# Patient Record
Sex: FEMALE | Race: WHITE | NOT HISPANIC OR LATINO | Employment: FULL TIME | URBAN - METROPOLITAN AREA
[De-identification: names, ages, dates, MRNs, and addresses within clinical notes are randomized per-mention and may not be internally consistent; named-entity substitution may affect disease eponyms.]

---

## 2023-04-24 ENCOUNTER — TELEPHONE (OUTPATIENT)
Dept: PRIMARY CARE | Facility: CLINIC | Age: 68
End: 2023-04-24
Payer: MEDICARE

## 2023-04-24 DIAGNOSIS — F41.8 MIXED ANXIETY DEPRESSIVE DISORDER: Primary | ICD-10-CM

## 2023-04-24 RX ORDER — SERTRALINE HYDROCHLORIDE 100 MG/1
100 TABLET, FILM COATED ORAL DAILY
Qty: 30 TABLET | Refills: 0 | Status: SHIPPED | OUTPATIENT
Start: 2023-04-24 | End: 2023-08-17 | Stop reason: SDUPTHER

## 2023-04-24 RX ORDER — SERTRALINE HYDROCHLORIDE 100 MG/1
1 TABLET, FILM COATED ORAL DAILY
COMMUNITY
Start: 2020-10-20 | End: 2023-04-24 | Stop reason: SDUPTHER

## 2023-04-24 NOTE — TELEPHONE ENCOUNTER
Rx Refill Request Telephone Encounter    Name:  Shani Watson  :  [id number]  Medication Name:      Sertraline HCl 100 MG Oral Tablet 1 tab taken daily                  Specific Pharmacy location:  [facility] in [place]    Date of last appointment:  2023  Date of next appointment:  2023  Best number to reach patient:  [phone]

## 2023-05-30 ENCOUNTER — TELEPHONE (OUTPATIENT)
Dept: PRIMARY CARE | Facility: CLINIC | Age: 68
End: 2023-05-30
Payer: MEDICARE

## 2023-05-30 NOTE — TELEPHONE ENCOUNTER
Rx Refill Request Telephone Encounter    Name:  Shani Watson  :  [id number]  Medication Name:      Sertraline HCl 100 MG Oral Tablet take 1 tab daily                  Specific Pharmacy location:  [facility] in [place]    Date of last appointment:  2023  Date of next appointment:  2023  Best number to reach patient:  [phone]

## 2023-07-12 RX ORDER — PHENOL 1.4 %
1 AEROSOL, SPRAY (ML) MUCOUS MEMBRANE DAILY
COMMUNITY
Start: 2020-08-05

## 2023-07-12 RX ORDER — SPIRONOLACTONE 25 MG/1
12.5 TABLET ORAL ONCE
COMMUNITY
Start: 2022-04-21 | End: 2023-10-20 | Stop reason: SDUPTHER

## 2023-07-12 RX ORDER — PRASUGREL 10 MG/1
1 TABLET, FILM COATED ORAL DAILY
COMMUNITY
Start: 2018-05-17

## 2023-07-12 RX ORDER — PROPRANOLOL HYDROCHLORIDE 20 MG/1
20 TABLET ORAL DAILY
COMMUNITY
Start: 2012-11-15 | End: 2023-08-17 | Stop reason: WASHOUT

## 2023-07-12 RX ORDER — EZETIMIBE 10 MG/1
1 TABLET ORAL DAILY
COMMUNITY
Start: 2021-09-07 | End: 2024-03-22 | Stop reason: SDUPTHER

## 2023-07-12 RX ORDER — FLUTICASONE PROPIONATE 50 MCG
1 SPRAY, SUSPENSION (ML) NASAL 2 TIMES DAILY
COMMUNITY
Start: 2010-10-12

## 2023-07-12 RX ORDER — SERTRALINE HYDROCHLORIDE 50 MG/1
50 TABLET, FILM COATED ORAL DAILY
COMMUNITY
Start: 2023-02-27 | End: 2023-08-17 | Stop reason: SDUPTHER

## 2023-07-12 RX ORDER — AMIODARONE HYDROCHLORIDE 100 MG/1
1 TABLET ORAL DAILY
COMMUNITY
End: 2023-10-20 | Stop reason: SDUPTHER

## 2023-07-12 RX ORDER — GABAPENTIN 300 MG/1
1-2 CAPSULE ORAL DAILY PRN
COMMUNITY
Start: 2014-03-07 | End: 2023-07-27 | Stop reason: SDUPTHER

## 2023-07-12 RX ORDER — METOPROLOL SUCCINATE 50 MG/1
1 TABLET, EXTENDED RELEASE ORAL DAILY
COMMUNITY
Start: 2018-05-17

## 2023-07-12 RX ORDER — ACETAMINOPHEN 500 MG/1
CAPSULE, LIQUID FILLED ORAL
COMMUNITY

## 2023-07-13 ENCOUNTER — OFFICE VISIT (OUTPATIENT)
Dept: PRIMARY CARE | Facility: CLINIC | Age: 68
End: 2023-07-13
Payer: MEDICARE

## 2023-07-13 VITALS
HEIGHT: 60 IN | WEIGHT: 141.2 LBS | HEART RATE: 63 BPM | SYSTOLIC BLOOD PRESSURE: 108 MMHG | BODY MASS INDEX: 27.72 KG/M2 | DIASTOLIC BLOOD PRESSURE: 60 MMHG | OXYGEN SATURATION: 96 % | TEMPERATURE: 96.3 F

## 2023-07-13 DIAGNOSIS — G89.29 CHRONIC BILATERAL LOW BACK PAIN WITH BILATERAL SCIATICA: Primary | ICD-10-CM

## 2023-07-13 DIAGNOSIS — M54.41 CHRONIC BILATERAL LOW BACK PAIN WITH BILATERAL SCIATICA: Primary | ICD-10-CM

## 2023-07-13 DIAGNOSIS — M54.42 CHRONIC BILATERAL LOW BACK PAIN WITH BILATERAL SCIATICA: Primary | ICD-10-CM

## 2023-07-13 PROBLEM — Z95.810 CARDIAC RESYNCHRONIZATION THERAPY DEFIBRILLATOR (CRT-D) IN PLACE: Status: ACTIVE | Noted: 2023-07-13

## 2023-07-13 PROBLEM — E78.5 HYPERLIPIDEMIA: Status: ACTIVE | Noted: 2023-07-13

## 2023-07-13 PROBLEM — I73.9 CLAUDICATION, INTERMITTENT (CMS-HCC): Status: ACTIVE | Noted: 2023-07-13

## 2023-07-13 PROBLEM — I25.10 CAD IN NATIVE ARTERY: Status: ACTIVE | Noted: 2023-07-13

## 2023-07-13 PROBLEM — I50.20 SYSTOLIC HEART FAILURE, ACC/AHA STAGE C (MULTI): Status: ACTIVE | Noted: 2023-07-13

## 2023-07-13 PROBLEM — J30.9 ALLERGIC RHINITIS: Status: ACTIVE | Noted: 2023-07-13

## 2023-07-13 PROBLEM — F41.9 ANXIETY: Status: ACTIVE | Noted: 2023-07-13

## 2023-07-13 PROBLEM — I25.5 ISCHEMIC CARDIOMYOPATHY: Status: ACTIVE | Noted: 2023-07-13

## 2023-07-13 PROBLEM — G47.00 INSOMNIA: Status: ACTIVE | Noted: 2023-07-13

## 2023-07-13 PROBLEM — G47.33 OSA ON CPAP: Status: ACTIVE | Noted: 2023-07-13

## 2023-07-13 PROBLEM — R73.01 IMPAIRED FASTING GLUCOSE: Status: ACTIVE | Noted: 2023-07-13

## 2023-07-13 PROBLEM — I47.29 VENTRICULAR TACHYCARDIA (PAROXYSMAL) (MULTI): Status: ACTIVE | Noted: 2023-07-13

## 2023-07-13 PROBLEM — Z95.0 PRESENCE OF CARDIAC PACEMAKER: Status: ACTIVE | Noted: 2023-07-13

## 2023-07-13 PROBLEM — Z98.61 S/P PTCA (PERCUTANEOUS TRANSLUMINAL CORONARY ANGIOPLASTY): Status: ACTIVE | Noted: 2023-07-13

## 2023-07-13 PROBLEM — I47.20 VENTRICULAR TACHYCARDIA (PAROXYSMAL): Status: ACTIVE | Noted: 2023-07-13

## 2023-07-13 PROCEDURE — 1159F MED LIST DOCD IN RCRD: CPT | Performed by: INTERNAL MEDICINE

## 2023-07-13 PROCEDURE — 1126F AMNT PAIN NOTED NONE PRSNT: CPT | Performed by: INTERNAL MEDICINE

## 2023-07-13 PROCEDURE — 3078F DIAST BP <80 MM HG: CPT | Performed by: INTERNAL MEDICINE

## 2023-07-13 PROCEDURE — 99213 OFFICE O/P EST LOW 20 MIN: CPT | Performed by: INTERNAL MEDICINE

## 2023-07-13 PROCEDURE — 1036F TOBACCO NON-USER: CPT | Performed by: INTERNAL MEDICINE

## 2023-07-13 PROCEDURE — 3074F SYST BP LT 130 MM HG: CPT | Performed by: INTERNAL MEDICINE

## 2023-07-13 RX ORDER — TIZANIDINE 4 MG/1
4 TABLET ORAL EVERY 8 HOURS PRN
Qty: 30 TABLET | Refills: 1 | Status: SHIPPED | OUTPATIENT
Start: 2023-07-13

## 2023-07-13 ASSESSMENT — ENCOUNTER SYMPTOMS
RESPIRATORY NEGATIVE: 1
PSYCHIATRIC NEGATIVE: 1
CARDIOVASCULAR NEGATIVE: 1
HEMATOLOGIC/LYMPHATIC NEGATIVE: 1
CONSTITUTIONAL NEGATIVE: 1
NEUROLOGICAL NEGATIVE: 1
MUSCULOSKELETAL NEGATIVE: 1
GASTROINTESTINAL NEGATIVE: 1
ALLERGIC/IMMUNOLOGIC NEGATIVE: 1
EYES NEGATIVE: 1
ENDOCRINE NEGATIVE: 1

## 2023-07-13 NOTE — PROGRESS NOTES
"Subjective   Patient ID: FAYESOLOMON Watson is a 67 y.o. female who presents for pain in back .  Patient presents today with a complaint of back pain.    She has hx of compression fracture of the lower lumbar spine in 2003 which healed spontaneously.  She states that she thinks it healed somewhat \"crookedly\" and ever since she has experienced spasm and knots in the lower back as well as radiculopathy both legs R>L.  She has not seen a back specialist for \"a long time.\"  She was seeing a chiropractor for quite some time ([phone]) which was beneficial, but not affordable long-term.  She has not seen physical therapy nor had any x-rays in quite some time.        Review of Systems   Constitutional: Negative.    HENT: Negative.     Eyes: Negative.    Respiratory: Negative.     Cardiovascular: Negative.    Gastrointestinal: Negative.    Endocrine: Negative.    Genitourinary: Negative.    Musculoskeletal: Negative.    Skin: Negative.    Allergic/Immunologic: Negative.    Neurological: Negative.    Hematological: Negative.    Psychiatric/Behavioral: Negative.         Objective     Blood pressure 108/60, pulse 63, temperature 35.7 °C (96.3 °F), temperature source Temporal, height 1.518 m (4' 11.75\"), weight 64 kg (141 lb 3.2 oz), SpO2 96 %.   Physical Exam  Vitals reviewed.   Constitutional:       Appearance: Normal appearance.   Neck:      Vascular: No carotid bruit.   Cardiovascular:      Rate and Rhythm: Normal rate and regular rhythm.      Pulses: Normal pulses.      Heart sounds: Normal heart sounds. No murmur heard.  Pulmonary:      Effort: Pulmonary effort is normal.      Breath sounds: Normal breath sounds. No wheezing or rales.   Abdominal:      General: Abdomen is flat. There is no distension.      Palpations: Abdomen is soft.      Tenderness: There is no abdominal tenderness.   Musculoskeletal:         General: Normal range of motion.      Cervical back: Normal range of motion and neck supple.      Comments: " Tightness and spasm of the paravertebral musculature in the lumbar region.   Skin:     General: Skin is warm and dry.   Neurological:      General: No focal deficit present.      Mental Status: She is alert and oriented to person, place, and time.   Psychiatric:         Mood and Affect: Mood normal.         Behavior: Behavior normal.         Assessment/Plan   Problem List Items Addressed This Visit    None  Visit Diagnoses       Chronic bilateral low back pain with bilateral sciatica    -  Primary          Will x-ray and refer to PT.  Will also prescribe muscle relaxant therapy for prn use.  If she is failing this, will consider referral to spine Ortho.    Follow up as scheduled

## 2023-07-27 DIAGNOSIS — M54.41 CHRONIC BILATERAL LOW BACK PAIN WITH BILATERAL SCIATICA: Primary | ICD-10-CM

## 2023-07-27 DIAGNOSIS — G89.29 CHRONIC BILATERAL LOW BACK PAIN WITH BILATERAL SCIATICA: Primary | ICD-10-CM

## 2023-07-27 DIAGNOSIS — M54.42 CHRONIC BILATERAL LOW BACK PAIN WITH BILATERAL SCIATICA: Primary | ICD-10-CM

## 2023-07-27 RX ORDER — GABAPENTIN 300 MG/1
300-600 CAPSULE ORAL DAILY PRN
Qty: 60 CAPSULE | Refills: 0 | Status: SHIPPED | OUTPATIENT
Start: 2023-07-27 | End: 2023-08-17 | Stop reason: SDUPTHER

## 2023-07-27 NOTE — TELEPHONE ENCOUNTER
Rx Refill Request Telephone Encounter    Name:  Shani Watson  :  [id number]  Medication Name:        Gabapentin 300 mg oral capsule take 1-2 capsules daily as needed                    Specific Pharmacy location:   [facility] in [place]    Date of last appointment:  2023  Date of next appointment:  2023  Best number to reach patient:  [phone]

## 2023-08-17 ENCOUNTER — OFFICE VISIT (OUTPATIENT)
Dept: PRIMARY CARE | Facility: CLINIC | Age: 68
End: 2023-08-17
Payer: MEDICARE

## 2023-08-17 VITALS
TEMPERATURE: 97.3 F | BODY MASS INDEX: 27.37 KG/M2 | HEIGHT: 60 IN | OXYGEN SATURATION: 98 % | SYSTOLIC BLOOD PRESSURE: 110 MMHG | DIASTOLIC BLOOD PRESSURE: 58 MMHG | WEIGHT: 139.4 LBS | HEART RATE: 65 BPM

## 2023-08-17 DIAGNOSIS — M54.41 CHRONIC BILATERAL LOW BACK PAIN WITH BILATERAL SCIATICA: ICD-10-CM

## 2023-08-17 DIAGNOSIS — G47.33 OSA ON CPAP: ICD-10-CM

## 2023-08-17 DIAGNOSIS — G89.29 CHRONIC BILATERAL LOW BACK PAIN WITH BILATERAL SCIATICA: ICD-10-CM

## 2023-08-17 DIAGNOSIS — I25.10 CAD IN NATIVE ARTERY: ICD-10-CM

## 2023-08-17 DIAGNOSIS — Z12.31 SCREENING MAMMOGRAM FOR BREAST CANCER: ICD-10-CM

## 2023-08-17 DIAGNOSIS — F41.8 MIXED ANXIETY DEPRESSIVE DISORDER: Primary | ICD-10-CM

## 2023-08-17 DIAGNOSIS — Z95.0 PRESENCE OF CARDIAC PACEMAKER: ICD-10-CM

## 2023-08-17 DIAGNOSIS — E78.00 PURE HYPERCHOLESTEROLEMIA: ICD-10-CM

## 2023-08-17 DIAGNOSIS — M54.42 CHRONIC BILATERAL LOW BACK PAIN WITH BILATERAL SCIATICA: ICD-10-CM

## 2023-08-17 DIAGNOSIS — M65.30 TRIGGER FINGER, UNSPECIFIED FINGER, UNSPECIFIED LATERALITY: ICD-10-CM

## 2023-08-17 PROCEDURE — 3078F DIAST BP <80 MM HG: CPT | Performed by: INTERNAL MEDICINE

## 2023-08-17 PROCEDURE — 1036F TOBACCO NON-USER: CPT | Performed by: INTERNAL MEDICINE

## 2023-08-17 PROCEDURE — 1159F MED LIST DOCD IN RCRD: CPT | Performed by: INTERNAL MEDICINE

## 2023-08-17 PROCEDURE — 1126F AMNT PAIN NOTED NONE PRSNT: CPT | Performed by: INTERNAL MEDICINE

## 2023-08-17 PROCEDURE — 99214 OFFICE O/P EST MOD 30 MIN: CPT | Performed by: INTERNAL MEDICINE

## 2023-08-17 PROCEDURE — 3074F SYST BP LT 130 MM HG: CPT | Performed by: INTERNAL MEDICINE

## 2023-08-17 RX ORDER — SERTRALINE HYDROCHLORIDE 50 MG/1
50 TABLET, FILM COATED ORAL DAILY
Qty: 90 TABLET | Refills: 1 | Status: SHIPPED | OUTPATIENT
Start: 2023-08-17 | End: 2024-02-12 | Stop reason: SDUPTHER

## 2023-08-17 RX ORDER — GABAPENTIN 300 MG/1
300-600 CAPSULE ORAL DAILY PRN
Qty: 60 CAPSULE | Refills: 0 | Status: SHIPPED | OUTPATIENT
Start: 2023-08-17 | End: 2023-12-12 | Stop reason: SDUPTHER

## 2023-08-17 RX ORDER — SERTRALINE HYDROCHLORIDE 100 MG/1
100 TABLET, FILM COATED ORAL DAILY
Qty: 30 TABLET | Refills: 0 | Status: SHIPPED | OUTPATIENT
Start: 2023-08-17 | End: 2023-08-29 | Stop reason: SDUPTHER

## 2023-08-17 ASSESSMENT — ENCOUNTER SYMPTOMS
DEPRESSION: 1
OCCASIONAL FEELINGS OF UNSTEADINESS: 0
LOSS OF SENSATION IN FEET: 1

## 2023-08-17 NOTE — PROGRESS NOTES
"Subjective   Patient ID: FAYE Watson is a 67 y.o. female who presents for 6 month follow up.  Patient presents today in follow up re:   mixed anxiety-depressive disorder.    Anxiety symptoms have been overall well controlled with current medication therapy.  No adverse effects of medication reported.  No new or associated issues reported.    Depressive condition has been well controlled with current medication therapy.  Patient denies side effects of medication.  Denies any exacerbation of symptoms other than with increased stress.  No other associated complaints voiced today.         Review of Systems    Objective     Blood pressure 110/58, pulse 65, temperature 36.3 °C (97.3 °F), temperature source Temporal, height 1.511 m (4' 11.5\"), weight 63.2 kg (139 lb 6.4 oz), SpO2 98 %.   Physical Exam    Assessment/Plan   Problem List Items Addressed This Visit       CAD in native artery    Hyperlipidemia    Relevant Orders    Comprehensive Metabolic Panel    Lipid Panel    Mixed anxiety depressive disorder - Primary    Relevant Medications    sertraline (Zoloft) 100 mg tablet    sertraline (Zoloft) 50 mg tablet    RODRI on CPAP    Presence of cardiac pacemaker    Chronic bilateral low back pain with bilateral sciatica    Relevant Medications    gabapentin (Neurontin) 300 mg capsule     Other Visit Diagnoses       Trigger finger, unspecified finger, unspecified laterality        Screening mammogram for breast cancer        Relevant Orders    BI mammo bilateral screening tomosynthesis          Anxiety symptoms well controlled and doing well on current therapy.  Will continue present therapy and follow clinically.  Depressive condition well compensated on current medication.  Will continue present therapy and follow clinically.  Back pain overall well compensated on current therapy.  Will continue present therapy and follow.  She questions applying for disability  She had injection therapy for trigger finger per Dr. Barakat " with good results.  She has c/o difficulty with sleep.  She does not use CPAP.  She states she awakens frequently and can't get back to sleep.  Subsequently, she feels fatigued during the day.  She states this is an intermittent issue that will come on for a week or so then ronen for awhile.  She continues follow up with Cardiology and Vascular services for these issues.  It is noted that she was to be started on Nexletol therapy at last Cardio visit but she states it was much too expensive.  I assured her that this is fine as her LDL is <100 anyway which is the ultimate study proven goal.  No further labs have been checked or ordered.  I will order.  She is also overdue for mammogram.  Will order.    > 30 minutes was spent with the patient today, the majority of which was spent on review of history and medications as well as counselling on care plan and/or coordination of care.     Follow up in 6 months

## 2023-08-21 ENCOUNTER — APPOINTMENT (OUTPATIENT)
Dept: PRIMARY CARE | Facility: CLINIC | Age: 68
End: 2023-08-21
Payer: MEDICARE

## 2023-08-29 DIAGNOSIS — F41.8 MIXED ANXIETY DEPRESSIVE DISORDER: ICD-10-CM

## 2023-08-29 RX ORDER — SERTRALINE HYDROCHLORIDE 100 MG/1
100 TABLET, FILM COATED ORAL DAILY
Qty: 90 TABLET | Refills: 1 | Status: SHIPPED | OUTPATIENT
Start: 2023-08-29 | End: 2024-02-12 | Stop reason: SDUPTHER

## 2023-08-29 NOTE — TELEPHONE ENCOUNTER
Rx Refill Request Telephone Encounter    Name:  Shani Watson  :  [id number]  Medication Name:  Sertraline   Dose : 100 mg  Route : oral  Frequency : daily  Quantity : 30    Specific Pharmacy location:  [facility]  Date of last appointment:  Aug 21  Date of next appointment:  2024  Best number to reach patient:  [phone]

## 2023-10-20 DIAGNOSIS — I10 BENIGN ESSENTIAL HYPERTENSION: Primary | ICD-10-CM

## 2023-10-20 DIAGNOSIS — I47.29 VENTRICULAR TACHYCARDIA (PAROXYSMAL) (MULTI): ICD-10-CM

## 2023-10-20 DIAGNOSIS — I47.29 VENTRICULAR TACHYCARDIA (PAROXYSMAL) (MULTI): Primary | ICD-10-CM

## 2023-10-20 DIAGNOSIS — I50.20 SYSTOLIC HEART FAILURE, ACC/AHA STAGE C (MULTI): ICD-10-CM

## 2023-10-20 DIAGNOSIS — Z79.899 LONG TERM CURRENT USE OF AMIODARONE: ICD-10-CM

## 2023-10-20 RX ORDER — SPIRONOLACTONE 25 MG/1
12.5 TABLET ORAL DAILY
Qty: 15 TABLET | Refills: 0 | Status: SHIPPED | OUTPATIENT
Start: 2023-10-20 | End: 2024-03-22 | Stop reason: SDUPTHER

## 2023-10-20 RX ORDER — AMIODARONE HYDROCHLORIDE 100 MG/1
100 TABLET ORAL DAILY
Qty: 30 TABLET | Refills: 0 | Status: SHIPPED | OUTPATIENT
Start: 2023-10-20 | End: 2023-12-11 | Stop reason: SDUPTHER

## 2023-10-30 PROBLEM — Z98.41 HISTORY OF YAG LASER CAPSULOTOMY OF LENS OF RIGHT EYE: Status: ACTIVE | Noted: 2023-10-30

## 2023-10-30 PROBLEM — R29.898 LEG WEAKNESS: Status: ACTIVE | Noted: 2023-10-30

## 2023-10-30 PROBLEM — M79.641 RIGHT HAND PAIN: Status: ACTIVE | Noted: 2023-10-30

## 2023-10-30 PROBLEM — I51.9 HEART DISEASE: Status: ACTIVE | Noted: 2018-04-01

## 2023-10-30 PROBLEM — M54.41 ACUTE BILATERAL LOW BACK PAIN WITH BILATERAL SCIATICA: Status: ACTIVE | Noted: 2023-10-30

## 2023-10-30 PROBLEM — R29.898 WEAKNESS OF BOTH LOWER EXTREMITIES: Status: ACTIVE | Noted: 2023-10-30

## 2023-10-30 PROBLEM — M79.671 PAIN OF RIGHT HEEL: Status: ACTIVE | Noted: 2023-10-30

## 2023-10-30 PROBLEM — M54.42 ACUTE BILATERAL LOW BACK PAIN WITH BILATERAL SCIATICA: Status: ACTIVE | Noted: 2023-10-30

## 2023-10-30 PROBLEM — H26.493 PCO (POSTERIOR CAPSULAR OPACIFICATION), BILATERAL: Status: ACTIVE | Noted: 2023-10-30

## 2023-10-30 PROBLEM — R26.9 GAIT ABNORMALITY: Status: ACTIVE | Noted: 2023-10-30

## 2023-10-30 PROBLEM — R94.39 ABNORMAL STRESS TEST: Status: ACTIVE | Noted: 2023-10-30

## 2023-10-30 PROBLEM — F43.21 GRIEF REACTION: Status: ACTIVE | Noted: 2023-10-30

## 2023-10-30 PROBLEM — R68.89 ABNORMAL ANKLE BRACHIAL INDEX: Status: ACTIVE | Noted: 2023-10-30

## 2023-10-30 PROBLEM — H93.13 TINNITUS OF BOTH EARS: Status: ACTIVE | Noted: 2023-10-30

## 2023-10-30 PROBLEM — I50.22 CHRONIC SYSTOLIC HEART FAILURE (MULTI): Status: ACTIVE | Noted: 2023-07-13

## 2023-10-30 PROBLEM — M72.2 PLANTAR FASCIITIS, RIGHT: Status: ACTIVE | Noted: 2023-10-30

## 2023-10-30 PROBLEM — H52.203 ASTIGMATISM OF BOTH EYES WITH PRESBYOPIA: Status: ACTIVE | Noted: 2023-10-30

## 2023-10-30 PROBLEM — M81.0 OSTEOPOROSIS: Status: ACTIVE | Noted: 2023-10-30

## 2023-10-30 PROBLEM — H43.811 PVD (POSTERIOR VITREOUS DETACHMENT), RIGHT EYE: Status: ACTIVE | Noted: 2023-10-30

## 2023-10-30 PROBLEM — T69.1XXA CHILBLAINS: Status: ACTIVE | Noted: 2023-10-30

## 2023-10-30 PROBLEM — G44.209 MIXED COMMON MIGRAINE AND MUSCLE CONTRACTION HEADACHE: Status: ACTIVE | Noted: 2023-10-30

## 2023-10-30 PROBLEM — M19.031 LOCALIZED PRIMARY OSTEOARTHRITIS OF CARPOMETACARPAL (CMC) JOINT OF RIGHT WRIST: Status: ACTIVE | Noted: 2023-10-30

## 2023-10-30 PROBLEM — R06.02 SHORTNESS OF BREATH ON EXERTION: Status: ACTIVE | Noted: 2023-10-30

## 2023-10-30 PROBLEM — Z96.1 PSEUDOPHAKIA OF BOTH EYES: Status: ACTIVE | Noted: 2023-10-30

## 2023-10-30 PROBLEM — H90.3 ASYMMETRICAL SENSORINEURAL HEARING LOSS: Status: ACTIVE | Noted: 2023-10-30

## 2023-10-30 PROBLEM — N39.0 UTI (URINARY TRACT INFECTION): Status: ACTIVE | Noted: 2023-10-30

## 2023-10-30 PROBLEM — I34.0 MITRAL VALVE INSUFFICIENCY: Status: ACTIVE | Noted: 2023-10-30

## 2023-10-30 PROBLEM — M85.80 OSTEOPENIA: Status: ACTIVE | Noted: 2023-10-30

## 2023-10-30 PROBLEM — D64.9 ANEMIA: Status: ACTIVE | Noted: 2023-10-30

## 2023-10-30 PROBLEM — H52.00 HYPEROPIA: Status: ACTIVE | Noted: 2023-10-30

## 2023-10-30 PROBLEM — R51.9 MORNING HEADACHE: Status: ACTIVE | Noted: 2023-10-30

## 2023-10-30 PROBLEM — F43.20 GRIEF REACTION: Status: ACTIVE | Noted: 2023-10-30

## 2023-10-30 PROBLEM — R42 DIZZINESS: Status: ACTIVE | Noted: 2023-10-30

## 2023-10-30 PROBLEM — R53.83 FATIGUE: Status: ACTIVE | Noted: 2023-10-30

## 2023-10-30 PROBLEM — H04.123 DRY EYES, BILATERAL: Status: ACTIVE | Noted: 2023-10-30

## 2023-10-30 PROBLEM — H53.8 BLURRY VISION, RIGHT EYE: Status: ACTIVE | Noted: 2023-10-30

## 2023-10-30 PROBLEM — R40.0 DAYTIME SOMNOLENCE: Status: ACTIVE | Noted: 2023-10-30

## 2023-10-30 PROBLEM — R31.9 HEMATURIA: Status: ACTIVE | Noted: 2023-10-30

## 2023-10-30 PROBLEM — G43.009 MIXED COMMON MIGRAINE AND MUSCLE CONTRACTION HEADACHE: Status: ACTIVE | Noted: 2023-10-30

## 2023-10-30 PROBLEM — H52.4 ASTIGMATISM OF BOTH EYES WITH PRESBYOPIA: Status: ACTIVE | Noted: 2023-10-30

## 2023-10-30 PROBLEM — I31.9 PERICARDITIS (HHS-HCC): Status: ACTIVE | Noted: 2023-10-30

## 2023-10-30 PROBLEM — T82.110A FAILURE OF PACEMAKER LEAD: Status: ACTIVE | Noted: 2023-10-30

## 2023-10-30 PROBLEM — Z95.1 S/P CABG X 1: Status: ACTIVE | Noted: 2023-10-30

## 2023-10-30 PROBLEM — R76.8 ANA POSITIVE: Status: ACTIVE | Noted: 2023-10-30

## 2023-10-31 ENCOUNTER — OFFICE VISIT (OUTPATIENT)
Dept: PRIMARY CARE | Facility: CLINIC | Age: 68
End: 2023-10-31
Payer: MEDICARE

## 2023-10-31 VITALS
HEART RATE: 63 BPM | SYSTOLIC BLOOD PRESSURE: 120 MMHG | WEIGHT: 141.8 LBS | DIASTOLIC BLOOD PRESSURE: 58 MMHG | TEMPERATURE: 96.2 F | BODY MASS INDEX: 28.16 KG/M2 | OXYGEN SATURATION: 98 %

## 2023-10-31 DIAGNOSIS — I50.22 CHRONIC SYSTOLIC HEART FAILURE (MULTI): ICD-10-CM

## 2023-10-31 DIAGNOSIS — I47.29 VENTRICULAR TACHYCARDIA (PAROXYSMAL) (MULTI): ICD-10-CM

## 2023-10-31 DIAGNOSIS — I25.708 ATHEROSCLEROSIS OF CORONARY ARTERY BYPASS GRAFT(S), UNSPECIFIED, WITH OTHER FORMS OF ANGINA PECTORIS (CMS-HCC): ICD-10-CM

## 2023-10-31 DIAGNOSIS — S39.012A ACUTE MYOFASCIAL STRAIN OF LUMBAR REGION, INITIAL ENCOUNTER: Primary | ICD-10-CM

## 2023-10-31 PROBLEM — I73.9 CLAUDICATION, INTERMITTENT (CMS-HCC): Status: RESOLVED | Noted: 2023-07-13 | Resolved: 2023-10-31

## 2023-10-31 PROCEDURE — 3074F SYST BP LT 130 MM HG: CPT | Performed by: INTERNAL MEDICINE

## 2023-10-31 PROCEDURE — 99213 OFFICE O/P EST LOW 20 MIN: CPT | Performed by: INTERNAL MEDICINE

## 2023-10-31 PROCEDURE — 1159F MED LIST DOCD IN RCRD: CPT | Performed by: INTERNAL MEDICINE

## 2023-10-31 PROCEDURE — 1036F TOBACCO NON-USER: CPT | Performed by: INTERNAL MEDICINE

## 2023-10-31 PROCEDURE — 1126F AMNT PAIN NOTED NONE PRSNT: CPT | Performed by: INTERNAL MEDICINE

## 2023-10-31 PROCEDURE — 3078F DIAST BP <80 MM HG: CPT | Performed by: INTERNAL MEDICINE

## 2023-10-31 ASSESSMENT — ENCOUNTER SYMPTOMS
CARDIOVASCULAR NEGATIVE: 1
ENDOCRINE NEGATIVE: 1
RESPIRATORY NEGATIVE: 1
BACK PAIN: 1
PSYCHIATRIC NEGATIVE: 1
CONSTITUTIONAL NEGATIVE: 1
GASTROINTESTINAL NEGATIVE: 1
HEMATOLOGIC/LYMPHATIC NEGATIVE: 1
ALLERGIC/IMMUNOLOGIC NEGATIVE: 1
EYES NEGATIVE: 1
NEUROLOGICAL NEGATIVE: 1

## 2023-10-31 NOTE — PROGRESS NOTES
Subjective   Patient ID: FAYE Watson is a 67 y.o. female who presents for bask spasms.  Patient presents today with a complaint of back spasms.    She states that 6 days ago shortly after she awoke from bed, she was sitting on her couch and when she began to rise from a sitting position on her couch, she had abrupt onset of spasmodic pain in the low back.  Since then, she has been taking tizanidine, applying a homeopathic menthol gel and has been trying to do stretches.  She gets brief relief from each of these therapies, but does not feel that she is improving.        Review of Systems   Constitutional: Negative.    HENT: Negative.     Eyes: Negative.    Respiratory: Negative.     Cardiovascular: Negative.    Gastrointestinal: Negative.    Endocrine: Negative.    Genitourinary: Negative.    Musculoskeletal:  Positive for back pain.   Skin: Negative.    Allergic/Immunologic: Negative.    Neurological: Negative.    Hematological: Negative.    Psychiatric/Behavioral: Negative.         Objective     Blood pressure 120/58, pulse 63, temperature 35.7 °C (96.2 °F), temperature source Temporal, weight 64.3 kg (141 lb 12.8 oz), SpO2 98 %.   Physical Exam  Constitutional:       Appearance: Normal appearance.   Pulmonary:      Effort: Pulmonary effort is normal.   Musculoskeletal:      Cervical back: Normal range of motion and neck supple.      Comments: Tenderness and spasm of the paraspinous musculature in the lumbar region R>L   Neurological:      General: No focal deficit present.      Mental Status: She is alert and oriented to person, place, and time.   Psychiatric:         Mood and Affect: Mood normal.         Thought Content: Thought content normal.         Assessment/Plan   Problem List Items Addressed This Visit       Ventricular tachycardia (paroxysmal) (CMS/HCC)    Chronic systolic heart failure (CMS/HCC)    Atherosclerosis of coronary artery bypass graft(s), unspecified, with other forms of angina pectoris  (CMS/[facility])     Other Visit Diagnoses       Acute myofascial strain of lumbar region, initial encounter    -  Primary    Relevant Orders    Referral to [facility]          I advised patient to continue present therapy, but I think she could benefit acutely from therapy with chiropractor.  We called and were successful at getting her in on 11/2 at 2:15 PM.  She continues to follow with Cardiology re:  all cardiac issues.    Follow up as scheduled

## 2023-11-03 RX ORDER — ALPRAZOLAM 0.5 MG/1
TABLET ORAL EVERY 12 HOURS
COMMUNITY
Start: 2019-06-12 | End: 2023-11-06 | Stop reason: WASHOUT

## 2023-11-03 RX ORDER — DOCUSATE SODIUM 100 MG/1
CAPSULE, LIQUID FILLED ORAL EVERY 12 HOURS
COMMUNITY
End: 2023-11-06 | Stop reason: WASHOUT

## 2023-11-03 RX ORDER — CITALOPRAM 20 MG/1
TABLET, FILM COATED ORAL
COMMUNITY
Start: 2020-02-11 | End: 2023-11-06 | Stop reason: WASHOUT

## 2023-11-03 RX ORDER — ALENDRONATE SODIUM 70 MG/1
TABLET ORAL
COMMUNITY
Start: 2018-09-10 | End: 2023-11-06 | Stop reason: WASHOUT

## 2023-11-05 ASSESSMENT — ENCOUNTER SYMPTOMS
SHORTNESS OF BREATH: 0
ALTERED MENTAL STATUS: 0
VOMITING: 0
FEVER: 0
ORTHOPNEA: 0
CHILLS: 0
WHEEZING: 0
PALPITATIONS: 0
COUGH: 0
HEMATURIA: 0
NEAR-SYNCOPE: 0
HEMATOCHEZIA: 0
SYNCOPE: 0
NAUSEA: 0
DYSPNEA ON EXERTION: 1
IRREGULAR HEARTBEAT: 0

## 2023-11-06 ENCOUNTER — LAB (OUTPATIENT)
Dept: LAB | Facility: LAB | Age: 68
End: 2023-11-06
Payer: MEDICARE

## 2023-11-06 ENCOUNTER — OFFICE VISIT (OUTPATIENT)
Dept: CARDIOLOGY | Facility: CLINIC | Age: 68
End: 2023-11-06
Payer: MEDICARE

## 2023-11-06 VITALS
OXYGEN SATURATION: 95 % | DIASTOLIC BLOOD PRESSURE: 84 MMHG | WEIGHT: 139.8 LBS | HEART RATE: 87 BPM | BODY MASS INDEX: 26.39 KG/M2 | HEIGHT: 61 IN | SYSTOLIC BLOOD PRESSURE: 118 MMHG

## 2023-11-06 DIAGNOSIS — I25.10 CAD IN NATIVE ARTERY: ICD-10-CM

## 2023-11-06 DIAGNOSIS — I47.29 VENTRICULAR TACHYCARDIA (PAROXYSMAL) (MULTI): ICD-10-CM

## 2023-11-06 DIAGNOSIS — I34.0 MITRAL VALVE INSUFFICIENCY, UNSPECIFIED ETIOLOGY: Primary | ICD-10-CM

## 2023-11-06 DIAGNOSIS — E78.5 HYPERLIPIDEMIA, UNSPECIFIED HYPERLIPIDEMIA TYPE: ICD-10-CM

## 2023-11-06 DIAGNOSIS — I50.22 CHRONIC SYSTOLIC HEART FAILURE (MULTI): ICD-10-CM

## 2023-11-06 DIAGNOSIS — Z79.899 LONG TERM CURRENT USE OF AMIODARONE: ICD-10-CM

## 2023-11-06 DIAGNOSIS — Z95.810 CARDIAC RESYNCHRONIZATION THERAPY DEFIBRILLATOR (CRT-D) IN PLACE: ICD-10-CM

## 2023-11-06 PROBLEM — Z98.49 HISTORY OF YAG LASER CAPSULOTOMY OF LENS: Status: ACTIVE | Noted: 2023-10-30

## 2023-11-06 PROBLEM — S39.92XA INJURY OF LOW BACK: Status: ACTIVE | Noted: 2023-11-06

## 2023-11-06 PROBLEM — H25.819 COMBINED FORMS OF AGE-RELATED CATARACT: Status: ACTIVE | Noted: 2019-04-03

## 2023-11-06 PROBLEM — T82.9XXA DISORDER OF CARDIAC PACEMAKER ELECTRODE: Status: ACTIVE | Noted: 2023-10-30

## 2023-11-06 PROBLEM — H52.209 ASTIGMATISM: Status: ACTIVE | Noted: 2023-10-30

## 2023-11-06 PROBLEM — G82.20 PARAPARESIS (MULTI): Status: ACTIVE | Noted: 2023-10-30

## 2023-11-06 PROBLEM — R06.09 DYSPNEA ON EXERTION: Status: ACTIVE | Noted: 2023-10-30

## 2023-11-06 PROBLEM — M62.81 MUSCLE WEAKNESS OF EXTREMITY: Status: ACTIVE | Noted: 2023-10-30

## 2023-11-06 PROBLEM — I25.810 ATHEROSCLEROSIS OF CORONARY ARTERY BYPASS GRAFT: Status: ACTIVE | Noted: 2023-10-31

## 2023-11-06 LAB — TSH SERPL-ACNC: 2.06 MIU/L (ref 0.44–3.98)

## 2023-11-06 PROCEDURE — 3079F DIAST BP 80-89 MM HG: CPT | Performed by: NURSE PRACTITIONER

## 2023-11-06 PROCEDURE — 36415 COLL VENOUS BLD VENIPUNCTURE: CPT

## 2023-11-06 PROCEDURE — 1159F MED LIST DOCD IN RCRD: CPT | Performed by: NURSE PRACTITIONER

## 2023-11-06 PROCEDURE — 3074F SYST BP LT 130 MM HG: CPT | Performed by: NURSE PRACTITIONER

## 2023-11-06 PROCEDURE — 1126F AMNT PAIN NOTED NONE PRSNT: CPT | Performed by: NURSE PRACTITIONER

## 2023-11-06 PROCEDURE — 84443 ASSAY THYROID STIM HORMONE: CPT

## 2023-11-06 PROCEDURE — 99214 OFFICE O/P EST MOD 30 MIN: CPT | Performed by: NURSE PRACTITIONER

## 2023-11-06 PROCEDURE — 1036F TOBACCO NON-USER: CPT | Performed by: NURSE PRACTITIONER

## 2023-11-06 PROCEDURE — 1160F RVW MEDS BY RX/DR IN RCRD: CPT | Performed by: NURSE PRACTITIONER

## 2023-11-06 NOTE — PATIENT INSTRUCTIONS
Recommend Mediterranean style of eating  Continue current medications  Check echocardiogram  Check fasting lab work as ordered by primary care provider  I will look for lab results later today and then send in your medication refills  Follow-up with Dr. Campa in 6 months  If you have any questions or cardiac concerns, please call our office at [phone].

## 2023-11-06 NOTE — PROGRESS NOTES
Chief Complaint/Reason for Visit:  6 month follow up 6 month cardiovascular follow up    History Of Present Illness:    FAYE Watson is a 67 y.o. female that presents to the office for 6 month follow up.  Taking medications as prescribed.     PMH significant for CAD s/p CABG x1 LIMA to LAD, intraoperative RV lead removal Oct 2018, CRT-D upgrade Nov 2018, VT ablation Jan 2019, anemia, osteoporosis, hyperlipidemia, anxiety, ? RODRI - did not tolerate CPAP, melanoma, depression, Chillblains and HFrEF.     She has chronic dyspnea on exertion that is at baseline and occurs at times when she walks her dog. She reports having some chest heaviness if she goes outside in the cold air and trieds to walk, but if she wears a mask or covers her face it does not occur. This is not new and happened last winter as well when she would walk in the cold.  She has not had it happen this year yet, but she doesn't think the weather has been cold enough. She has balance issues d/t one leg being shorter than the other. She reports that she is under increased amounts of stress d/t moving and having to downsize with her belongings and has 2 friends and a neighbor recently pass away.     Past Medical History:  She has a past medical history of Combined forms of age-related cataract, bilateral (04/03/2019), Depression, unspecified (11/16/2016), Essential (primary) hypertension (11/16/2016), Heart disease, unspecified, Other vitreous opacities, right eye (04/03/2019), Personal history of other diseases of the circulatory system (11/16/2016), Personal history of other diseases of the musculoskeletal system and connective tissue, Personal history of other diseases of the nervous system and sense organs, Personal history of other diseases of the nervous system and sense organs, and Personal history of other mental and behavioral disorders (05/22/2020).    Past Surgical History:  She has a past surgical history that includes Tonsillectomy  (11/16/2016); Dilation and curettage of uterus (11/16/2016); Cataract extraction (11/16/2016); Other surgical history (06/11/2019); Other surgical history (04/03/2019); Cardiac pacemaker placement (05/01/2018); and Cardiac surgery.      Social History:  She reports that she has never smoked. She has never used smokeless tobacco. She reports current alcohol use. She reports that she does not use drugs.    Family History:  Family History   Problem Relation Name Age of Onset    Heart disease Mother      Hypertension Mother      Coronary artery disease Mother      Osteoporosis Mother      Heart disease Father      Melanoma Father      Gout Father      Prostate cancer Father      Hypertension Brother      Amblyopia Brother      Cancer Father's Sister      Hypertension Maternal Grandfather      Heart attack Maternal Grandfather      Coronary artery disease Maternal Grandfather      Liver cancer Paternal Grandmother      Breast cancer Cousin          Allergies:  Bactrim [sulfamethoxazole-trimethoprim], Erythromycin, Amoxicillin, Milk, Statins-hmg-coa reductase inhibitors, Valacyclovir, Codeine, Diazepam, Latex, Meperidine, Penicillins, and Sulfa (sulfonamide antibiotics)    Review of Systems   Constitutional: Negative for chills and fever.   Cardiovascular:  Positive for chest pain (+chronic chest heaviness when she is outside in cold air) and dyspnea on exertion (chronic). Negative for irregular heartbeat, leg swelling, near-syncope, orthopnea, palpitations and syncope.   Respiratory:  Negative for cough, shortness of breath and wheezing.    Gastrointestinal:  Negative for hematochezia, melena, nausea and vomiting.   Genitourinary:  Negative for hematuria.   Psychiatric/Behavioral:  Negative for altered mental status.        Objective      Vitals reviewed.   Constitutional:       Appearance: Not in distress.   Neck:      Vascular: No carotid bruit.   Pulmonary:      Effort: Pulmonary effort is normal.      Breath sounds:  Normal breath sounds.   Cardiovascular:      PMI at left midclavicular line. Normal rate. Regular rhythm. S1 with normal intensity. S2 with normal intensity.       Murmurs: There is no murmur.   Edema:     Peripheral edema absent.   Abdominal:      General: Bowel sounds are normal.   Neurological:      Mental Status: Alert and oriented to person, place and time.       Current Outpatient Medications   Medication Instructions    acetaminophen 500 mg capsule oral    amiodarone (PACERONE) 100 mg, oral, Daily    ezetimibe (Zetia) 10 mg tablet 1 tablet, oral, Daily    fluticasone (Flonase) 50 mcg/actuation nasal spray 1 spray, nasal, 2 times daily    gabapentin (NEURONTIN) 300-600 mg, oral, Daily PRN    metoprolol succinate XL (Toprol-XL) 50 mg 24 hr tablet 1 tablet, oral, Daily    multivitamin-min-iron-FA-vit K (Adults Multivitamin) 18 mg iron-400 mcg-25 mcg tablet 1 tablet, oral, Daily    prasugrel (Effient) 10 mg tablet 1 tablet, oral, Daily    sertraline (ZOLOFT) 50 mg, oral, Daily    sertraline (ZOLOFT) 100 mg, oral, Daily    spironolactone (ALDACTONE) 12.5 mg, oral, Daily    tiZANidine (ZANAFLEX) 4 mg, oral, Every 8 hours PRN        Last Labs:  CBC -  Lab Results   Component Value Date    WBC 6.1 11/10/2022    HGB 15.1 11/10/2022    HCT 46.2 (H) 11/10/2022    MCV 92 11/10/2022     11/10/2022       CMP -  Lab Results   Component Value Date    CALCIUM 9.6 11/10/2022    PHOS 5.4 (H) 11/15/2018    PROT 6.7 11/10/2022    ALBUMIN 4.5 11/10/2022    AST 28 11/10/2022    ALT 23 11/10/2022    ALKPHOS 91 11/10/2022    BILITOT 0.9 11/10/2022       LIPID PANEL -   Lab Results   Component Value Date    CHOL 177 11/10/2022    TRIG 74 11/10/2022    HDL 61.9 11/10/2022    CHHDL 2.9 11/10/2022    LDLF 100 (H) 11/10/2022    VLDL 15 11/10/2022       RENAL FUNCTION PANEL -   Lab Results   Component Value Date    GLUCOSE 76 11/10/2022     11/10/2022    K 4.9 11/10/2022     11/10/2022    CO2 28 11/10/2022    ANIONGAP 12  11/10/2022    BUN 21 11/10/2022    CREATININE 1.16 (H) 11/10/2022    CALCIUM 9.6 11/10/2022    PHOS 5.4 (H) 11/15/2018    ALBUMIN 4.5 11/10/2022        Lab Results   Component Value Date    HGBA1C 5.5 10/26/2018     Lab Results   Component Value Date    TSH 2.34 11/10/2022       No results found for this or any previous visit.     Last Cardiology Tests:    [facility] 3/30/23:  Right Lower PVR: No evidence of arterial occlusive disease in the right lower extremity at rest. Triphasic flow is noted in the right common femoral artery, right posterior tibial artery and right dorsalis pedis artery. No TBI on the right digit due to flatlined waveform.  Left Lower PVR: No evidence of arterial occlusive disease in the left lower extremity at rest. Decreased digital perfusion noted. Triphasic flow is noted in the left common femoral artery, left posterior tibial artery and left dorsalis pedis artery.    Echo 10/11/22:   1. Left ventricular systolic function is mildly to moderately decreased with a 40-45% estimated ejection fraction.   2. Multiple segmental abnormalities exist. See findings.   3. Spectral Doppler shows a pseudonormal pattern of left ventricular diastolic filling.   4. There is low normal right ventricular systolic function.   5. The left atrium is moderately dilated.   6. Moderate mitral valve regurgitation.   7. Moderately decreased mitral valve posterior leaflet mobility.   8. Aortic valve stenosis is not present.    [facility] 4/21/22:   1. Single vessel coronary artery disease with proximal left anterior descending involvement.   2. Patent LIMA to LAD.   3. Elevated LV filling pressures.   4. Left Ventricular end-diastolic pressure = 20.    [facility]IA DEL CARMEN 1/14/20 showed LV systolic function is moderately decreased with LVEF 35-40%. Global hypokinesis of the LV with minor regional variations. Mild mitral valve regurgitation     Visit Vitals  /84 (BP Location: Right arm, Patient Position: Sitting, BP Cuff Size: Adult)   Pulse  "87   Ht 1.549 m (5' 1\")   Wt 63.4 kg (139 lb 12.8 oz)   SpO2 95%   BMI 26.41 kg/m²   Smoking Status Never   BSA 1.65 m²       Assessment/Plan   The primary encounter diagnosis was Mitral valve insufficiency, unspecified etiology. Diagnoses of CAD in native artery, Hyperlipidemia, unspecified hyperlipidemia type, Cardiac resynchronization therapy defibrillator (CRT-D) in place, Ventricular tachycardia (paroxysmal) (CMS/HCC), and Chronic systolic heart failure (CMS/HCC) were also pertinent to this visit.    1. Mitral regurgitation  MARIA DEL CARMEN Jan 2020 with mild mitral regurgitation  TTE Oct 2022 with moderate MR.   Check repeat echocardiogram     2. CAD s/p CABG Oct 2018 with ischemic CMP/chronic systolic heart failure.   Continue medical RX. Dizziness resolved after lisinopril d/c.   She is not on a statin. She states two different statins caused diarrhea. Looking back at previous documentation, she at one point was taking atorvastatin, but I do not see a second statin documented.  Goal LDL <70.   Declines trying different statin or PSCK9 inhibitor  Nexletol ordered during last office visit > she said this was too expensive  Continue Prasugrel 10 mg daily  Educated regarding Mediterranean style of eating   [facility] April 2022 with patent LIMA to LAD     3. Chronic systolic heart failure  Has ICD - follows with Dr. Macias (EP).   Continue metoprolol succinate and spironolactone.   Not on ACEi/ARB/ARNI d/t hypotension and dizziness on lisinopril.   She does not appear volume overloaded on exam  TTE Oct 2022 with LVEF 40-45%     4. VT s/p BiV-ICD and VT ablation Jan 2019  On amiodarone 100 mg daily and Metoprolol succinate 50 mg daily  Follows with EP - Dr. Macias  Check TSH - Dr. Cmapa already ordered    5. Dyslipidemia  Goal LDL <70  Intolerant of statins  Declines PCSK9 inhibitors  Nexletol ordered during last office visit > too expensive and she has not been taking this  Check fasting lipid panel and CMP as ordered by PCP    Demetra " NATHAN Duenas, APRN-CNP

## 2023-11-13 ENCOUNTER — TELEPHONE (OUTPATIENT)
Dept: CARDIOLOGY | Facility: CLINIC | Age: 68
End: 2023-11-13
Payer: MEDICARE

## 2023-11-13 ENCOUNTER — TELEPHONE (OUTPATIENT)
Dept: PRIMARY CARE | Facility: CLINIC | Age: 68
End: 2023-11-13
Payer: MEDICARE

## 2023-11-13 ENCOUNTER — HOSPITAL ENCOUNTER (OUTPATIENT)
Dept: CARDIOLOGY | Facility: HOSPITAL | Age: 68
Discharge: HOME | End: 2023-11-13
Payer: MEDICARE

## 2023-11-13 DIAGNOSIS — Z95.810 CARDIAC RESYNCHRONIZATION THERAPY DEFIBRILLATOR (CRT-D) IN PLACE: Primary | ICD-10-CM

## 2023-11-13 DIAGNOSIS — I25.10 ATHEROSCLEROTIC HEART DISEASE OF NATIVE CORONARY ARTERY WITHOUT ANGINA PECTORIS: ICD-10-CM

## 2023-11-13 DIAGNOSIS — I44.2 ATRIOVENTRICULAR BLOCK, COMPLETE (MULTI): ICD-10-CM

## 2023-11-13 DIAGNOSIS — I47.29 OTHER VENTRICULAR TACHYCARDIA (MULTI): ICD-10-CM

## 2023-11-13 PROCEDURE — 93290 INTERROG DEV EVAL ICPMS IP: CPT | Performed by: INTERNAL MEDICINE

## 2023-11-13 PROCEDURE — 93284 PRGRMG EVAL IMPLANTABLE DFB: CPT | Performed by: INTERNAL MEDICINE

## 2023-11-13 PROCEDURE — 93290 INTERROG DEV EVAL ICPMS IP: CPT

## 2023-11-13 NOTE — TELEPHONE ENCOUNTER
----- Message from SELVIN Trinh sent at 11/13/2023  3:38 PM EST -----  Regarding: RE: Please Return Call  Called patient back as I can see recent TSH, but CBC and CMP were not completed.  CBC and CMP need to be completed for Amiodarone refills. Voicemail left.    ----- Message -----  From: Kat Trejo  Sent: 11/13/2023   3:11 PM EST  To: SELVIN Trinh  Subject: Please Return Call

## 2023-11-27 ENCOUNTER — APPOINTMENT (OUTPATIENT)
Dept: RADIOLOGY | Facility: HOSPITAL | Age: 68
End: 2023-11-27
Payer: MEDICARE

## 2023-11-29 ENCOUNTER — APPOINTMENT (OUTPATIENT)
Dept: CARDIOLOGY | Facility: HOSPITAL | Age: 68
End: 2023-11-29
Payer: MEDICARE

## 2023-12-06 ENCOUNTER — APPOINTMENT (OUTPATIENT)
Dept: CARDIOLOGY | Facility: HOSPITAL | Age: 68
End: 2023-12-06
Payer: MEDICARE

## 2023-12-07 ENCOUNTER — APPOINTMENT (OUTPATIENT)
Dept: CARDIOLOGY | Facility: HOSPITAL | Age: 68
End: 2023-12-07
Payer: MEDICARE

## 2023-12-11 ENCOUNTER — HOSPITAL ENCOUNTER (OUTPATIENT)
Dept: RADIOLOGY | Facility: HOSPITAL | Age: 68
Discharge: HOME | End: 2023-12-11
Payer: MEDICARE

## 2023-12-11 DIAGNOSIS — M54.41 CHRONIC BILATERAL LOW BACK PAIN WITH BILATERAL SCIATICA: ICD-10-CM

## 2023-12-11 DIAGNOSIS — I47.29 VENTRICULAR TACHYCARDIA (PAROXYSMAL) (MULTI): ICD-10-CM

## 2023-12-11 DIAGNOSIS — M54.42 CHRONIC BILATERAL LOW BACK PAIN WITH BILATERAL SCIATICA: ICD-10-CM

## 2023-12-11 DIAGNOSIS — Z12.31 SCREENING MAMMOGRAM FOR BREAST CANCER: ICD-10-CM

## 2023-12-11 DIAGNOSIS — G89.29 CHRONIC BILATERAL LOW BACK PAIN WITH BILATERAL SCIATICA: ICD-10-CM

## 2023-12-11 PROCEDURE — 77063 BREAST TOMOSYNTHESIS BI: CPT | Mod: BILATERAL PROCEDURE | Performed by: RADIOLOGY

## 2023-12-11 PROCEDURE — 77067 SCR MAMMO BI INCL CAD: CPT

## 2023-12-11 PROCEDURE — 77067 SCR MAMMO BI INCL CAD: CPT | Mod: BILATERAL PROCEDURE | Performed by: RADIOLOGY

## 2023-12-11 NOTE — TELEPHONE ENCOUNTER
Rx Refill Request Telephone Encounter    Name:  Shani Watson  :  [id number]  Medication Name:        gabapentin (Neurontin) 300 mg capsule [[id number]]  take 1-2 caps by mouth as needed for heel pain                  Specific Pharmacy location:  [facility] in [place]  Date of last appointment:  10/31/2023  Date of next appointment:  2024  Best number to reach patient:  [phone]

## 2023-12-11 NOTE — TELEPHONE ENCOUNTER
----- Message from Kat Fermin sent at 12/11/2023  4:12 PM EST -----  Needs a refill of amiodarone 100 mg.  Please send to [facility] in [place].

## 2023-12-11 NOTE — TELEPHONE ENCOUNTER
Last appointment: 11/6/23 saw Demetra Duenas  Next appointment: 5/7/24 with Dr. Campa  Labs labs: TSH 11/6/23, but needs CBC and CMP for Amio. Also outstanding for Lipid which PCP ordered.

## 2023-12-12 RX ORDER — AMIODARONE HYDROCHLORIDE 100 MG/1
100 TABLET ORAL DAILY
Qty: 30 TABLET | Refills: 0 | Status: SHIPPED | OUTPATIENT
Start: 2023-12-12 | End: 2024-01-09 | Stop reason: SDUPTHER

## 2023-12-12 RX ORDER — GABAPENTIN 300 MG/1
300-600 CAPSULE ORAL DAILY PRN
Qty: 60 CAPSULE | Refills: 0 | Status: SHIPPED | OUTPATIENT
Start: 2023-12-12 | End: 2024-02-12 | Stop reason: SDUPTHER

## 2023-12-12 NOTE — TELEPHONE ENCOUNTER
I called and spoke with patient and let her know she needs CBC and LFT drawn for Amiodarone and I recommended having Lipid panel that PCP ordered. She reports she was told by PCP not to have Lipid panel until February bc insurance won't pay for it. I let her know usually it has to be 1 year for lipid panel which it has been 1 year now. She will have blood work draw. She has been sick for 3 weeks and was confused about these labs bc she had TSH in 11/2023, but the others she couldn't have yet.

## 2023-12-20 ENCOUNTER — APPOINTMENT (OUTPATIENT)
Dept: OPHTHALMOLOGY | Facility: CLINIC | Age: 68
End: 2023-12-20
Payer: MEDICARE

## 2023-12-20 ENCOUNTER — APPOINTMENT (OUTPATIENT)
Dept: CARDIOLOGY | Facility: HOSPITAL | Age: 68
End: 2023-12-20
Payer: MEDICARE

## 2023-12-27 ENCOUNTER — APPOINTMENT (OUTPATIENT)
Dept: CARDIOLOGY | Facility: HOSPITAL | Age: 68
End: 2023-12-27
Payer: MEDICARE

## 2024-01-03 ENCOUNTER — HOSPITAL ENCOUNTER (OUTPATIENT)
Dept: CARDIOLOGY | Facility: HOSPITAL | Age: 69
Discharge: HOME | End: 2024-01-03
Payer: MEDICARE

## 2024-01-03 DIAGNOSIS — I34.0 MITRAL VALVE INSUFFICIENCY, UNSPECIFIED ETIOLOGY: ICD-10-CM

## 2024-01-03 DIAGNOSIS — I50.22 CHRONIC SYSTOLIC HEART FAILURE (MULTI): ICD-10-CM

## 2024-01-03 LAB
AORTIC VALVE MEAN GRADIENT: 5.5
AORTIC VALVE PEAK VELOCITY: 1.62
AV PEAK GRADIENT: 10.5
AVA (PEAK VEL): 2.24
AVA (VTI): 2.21
EJECTION FRACTION APICAL 4 CHAMBER: 51.4
EJECTION FRACTION: 45
LEFT ATRIUM VOLUME AREA LENGTH INDEX BSA: 46.8
LEFT VENTRICLE INTERNAL DIMENSION DIASTOLE: 5.15 (ref 3.5–6)
LEFT VENTRICULAR OUTFLOW TRACT DIAMETER: 1.91
MITRAL VALVE E/A RATIO: 2.57
MITRAL VALVE E/E' RATIO: 22.08
RIGHT VENTRICLE FREE WALL PEAK S': 10.3
RIGHT VENTRICLE PEAK SYSTOLIC PRESSURE: 39.1
TRICUSPID ANNULAR PLANE SYSTOLIC EXCURSION: 2

## 2024-01-03 PROCEDURE — 93306 TTE W/DOPPLER COMPLETE: CPT

## 2024-01-03 PROCEDURE — 93306 TTE W/DOPPLER COMPLETE: CPT | Performed by: INTERNAL MEDICINE

## 2024-01-09 ENCOUNTER — LAB (OUTPATIENT)
Dept: LAB | Facility: LAB | Age: 69
End: 2024-01-09
Payer: MEDICARE

## 2024-01-09 DIAGNOSIS — I47.29 VENTRICULAR TACHYCARDIA (PAROXYSMAL) (MULTI): ICD-10-CM

## 2024-01-09 LAB
ALBUMIN SERPL BCP-MCNC: 4.5 G/DL (ref 3.4–5)
ALP SERPL-CCNC: 98 U/L (ref 33–136)
ALT SERPL W P-5'-P-CCNC: 16 U/L (ref 7–45)
ANION GAP SERPL CALC-SCNC: 10 MMOL/L (ref 10–20)
AST SERPL W P-5'-P-CCNC: 24 U/L (ref 9–39)
BILIRUB SERPL-MCNC: 1.3 MG/DL (ref 0–1.2)
BUN SERPL-MCNC: 16 MG/DL (ref 6–23)
CALCIUM SERPL-MCNC: 9.3 MG/DL (ref 8.6–10.3)
CHLORIDE SERPL-SCNC: 102 MMOL/L (ref 98–107)
CO2 SERPL-SCNC: 29 MMOL/L (ref 21–32)
CREAT SERPL-MCNC: 1.1 MG/DL (ref 0.5–1.05)
EGFRCR SERPLBLD CKD-EPI 2021: 55 ML/MIN/1.73M*2
ERYTHROCYTE [DISTWIDTH] IN BLOOD BY AUTOMATED COUNT: 13.6 % (ref 11.5–14.5)
GLUCOSE SERPL-MCNC: 75 MG/DL (ref 74–99)
HCT VFR BLD AUTO: 44.5 % (ref 36–46)
HGB BLD-MCNC: 15 G/DL (ref 12–16)
MCH RBC QN AUTO: 30.1 PG (ref 26–34)
MCHC RBC AUTO-ENTMCNC: 33.7 G/DL (ref 32–36)
MCV RBC AUTO: 89 FL (ref 80–100)
NRBC BLD-RTO: 0 /100 WBCS (ref 0–0)
PLATELET # BLD AUTO: 191 X10*3/UL (ref 150–450)
POTASSIUM SERPL-SCNC: 4.5 MMOL/L (ref 3.5–5.3)
PROT SERPL-MCNC: 6.4 G/DL (ref 6.4–8.2)
RBC # BLD AUTO: 4.98 X10*6/UL (ref 4–5.2)
SODIUM SERPL-SCNC: 136 MMOL/L (ref 136–145)
WBC # BLD AUTO: 7 X10*3/UL (ref 4.4–11.3)

## 2024-01-09 PROCEDURE — 80053 COMPREHEN METABOLIC PANEL: CPT

## 2024-01-09 PROCEDURE — 85027 COMPLETE CBC AUTOMATED: CPT

## 2024-01-09 PROCEDURE — 36415 COLL VENOUS BLD VENIPUNCTURE: CPT

## 2024-01-09 RX ORDER — AMIODARONE HYDROCHLORIDE 100 MG/1
100 TABLET ORAL DAILY
Qty: 90 TABLET | Refills: 1 | Status: SHIPPED | OUTPATIENT
Start: 2024-01-09 | End: 2024-03-25 | Stop reason: SDUPTHER

## 2024-01-09 NOTE — TELEPHONE ENCOUNTER
----- Message from Shani Watson sent at 1/9/2024  9:53 AM EST -----  Regarding: Lab orders (comp. metab. panel; CBC)  Contact: [phone]  Good Demetra allen-  This is Shani Watson. I wanted to let you know that I had my (fasting) blood drawn this morning.  Also, barring any issues with the results, I need to request a refill on my Amiodarone. Including tonight, I have 5 doses remaining. My pharmacy is still at [facility].  Thank you,  GS

## 2024-01-09 NOTE — TELEPHONE ENCOUNTER
Last appointment: 11/6/23 with Demetra Duenas  Next appointment: 5/7/24 with Dr. Campa  Labs labs: CBC/CMP 1/9/24 and TSH 11/6/23      Last refilled 12/12/23 30 days 0 refills until labs done.

## 2024-01-22 ENCOUNTER — APPOINTMENT (OUTPATIENT)
Dept: CARDIOLOGY | Facility: HOSPITAL | Age: 69
End: 2024-01-22
Payer: MEDICARE

## 2024-01-23 ENCOUNTER — HOSPITAL ENCOUNTER (OUTPATIENT)
Dept: CARDIOLOGY | Facility: HOSPITAL | Age: 69
Discharge: HOME | End: 2024-01-23
Payer: MEDICARE

## 2024-01-23 DIAGNOSIS — I47.29 OTHER VENTRICULAR TACHYCARDIA (MULTI): ICD-10-CM

## 2024-01-23 DIAGNOSIS — Z95.810 CARDIAC RESYNCHRONIZATION THERAPY DEFIBRILLATOR (CRT-D) IN PLACE: Primary | ICD-10-CM

## 2024-01-23 DIAGNOSIS — I44.2 ATRIOVENTRICULAR BLOCK, COMPLETE (MULTI): ICD-10-CM

## 2024-01-23 DIAGNOSIS — Z95.810 CARDIAC RESYNCHRONIZATION THERAPY DEFIBRILLATOR (CRT-D) IN PLACE: ICD-10-CM

## 2024-01-24 ENCOUNTER — APPOINTMENT (OUTPATIENT)
Dept: OPHTHALMOLOGY | Facility: CLINIC | Age: 69
End: 2024-01-24
Payer: MEDICARE

## 2024-01-30 ASSESSMENT — EXTERNAL EXAM - RIGHT EYE: OD_EXAM: NORMAL

## 2024-01-30 ASSESSMENT — CUP TO DISC RATIO
OD_RATIO: .3
OS_RATIO: .3

## 2024-01-30 ASSESSMENT — EXTERNAL EXAM - LEFT EYE: OS_EXAM: NORMAL

## 2024-01-30 ASSESSMENT — SLIT LAMP EXAM - LIDS
COMMENTS: GOOD POSITION
COMMENTS: GOOD POSITION

## 2024-01-30 NOTE — PROGRESS NOTES
Blurry vision, right eyeH53.8  Long-term use of high-risk [id number]  -Has been on amiodarone since 2018, was starting to get neuropathy symptoms and dose reduced from 200mg to 100mg at beginning of October 2020. Symptoms have improved, but not resolved.   -Patient with blurry vision OD for 3-4 months Fall 2020 - constant, does not fluctuate, not relieved with artificial tears. Per patient, OD still seems blurry compared to OS.   -Color plates (1/31/24) - 11/11 OU  -Red desaturation test (5/5/21) -  OS seems darker and less bright compared to OD - seems stable per patient.   -OCT RNFL (1/31/24) - SS: 7/10 OD and 5/10 OS. OD: Thin S. OS: Bord S. 98/90. Poor image capture OD, but fairly stable from 10/21/20.   -OCT macula (1/31/24) - SS: 8/10 OD and 7/10 OS. Normal thickness and contour. Intact IS-OS OU. No edema OU. 266/259. Stable from 10/21/20.   -HVF 24-2 (1/31/24) - OD: Good reliability. WNL. OS: 3/14FL 6%FP. Inferonasal depression. Similar to 5/5/21.   -DDx for blurry vision OD: Dry eye vs PVD vs other. Advised patient to lubricate eyes frequently. Mild changes in color vision and OCT RNFL raise concern for mild optic neuropathy OD. Saw Dr. Capellan 11/10/20 - no optic disc edema noted, no APD or color vision loss. Not thought to have optic neuropathy related to amiodarone.  -Per patient, feels vision OD seems worse compared to previous vision. However, uncorrected vision is 20/25 and BCVA is 20/20. Rx given as below. No disc edema seen on exam today. OCT RNFL, OCT macula, HVF 24-2 appear stable.   **Next time patient does HVF 24-2, may need to consider doing LEFT EYE first.   -Would monitor for now. F/u 1 year for comprehensive exam with color plates, OCT RNFL, OCT macula, and HVF 24-2.     History of YAG laser capsulotomy of lens of right eyeZ98.41  PCO (posterior capsular opacification), [id number]  Pseudophakia of both eyesZ96.1  -Had cataract surgery OU around 2016  -S/p YAG  capsulotomy OD 2/18/20 - doing well. Vision has improved. Capsule still with nice opening.     Posterior vitreous detachment, bilateral  -Floater OS x 6 months  -No retinal tear or detachment seen on exam. Retinal detachment symptoms discussed.     Dry eyes, zuszusckfO63.123  -Symptoms improved.  -Use [place] OU BID prn for allergies  -Use warm compresses and use artificial tears 2-4x/day    Astigmatism  QazruhyxavT63.4  -New Rx given, progressives vs DVO for driving, wants to wear something for clarity when driving.   -F/u 1 year for comprehensive exam with color plates, OCT RNFL, OCT macula, and HVF 24-2.

## 2024-01-31 ENCOUNTER — OFFICE VISIT (OUTPATIENT)
Dept: OPHTHALMOLOGY | Facility: CLINIC | Age: 69
End: 2024-01-31
Payer: MEDICARE

## 2024-01-31 DIAGNOSIS — H26.492 PCO (POSTERIOR CAPSULAR OPACIFICATION), LEFT: ICD-10-CM

## 2024-01-31 DIAGNOSIS — H04.123 DRY EYES, BILATERAL: ICD-10-CM

## 2024-01-31 DIAGNOSIS — Z98.41 HISTORY OF YAG LASER CAPSULOTOMY OF LENS OF RIGHT EYE: ICD-10-CM

## 2024-01-31 DIAGNOSIS — H52.203 ASTIGMATISM OF BOTH EYES, UNSPECIFIED TYPE: ICD-10-CM

## 2024-01-31 DIAGNOSIS — Z79.899 LONG-TERM USE OF HIGH-RISK MEDICATION: ICD-10-CM

## 2024-01-31 DIAGNOSIS — H53.8 BLURRY VISION, RIGHT EYE: Primary | ICD-10-CM

## 2024-01-31 DIAGNOSIS — H26.491 PCO (POSTERIOR CAPSULAR OPACIFICATION), RIGHT: ICD-10-CM

## 2024-01-31 DIAGNOSIS — Z96.1 PSEUDOPHAKIA: ICD-10-CM

## 2024-01-31 DIAGNOSIS — H43.813 POSTERIOR VITREOUS DETACHMENT OF BOTH EYES: ICD-10-CM

## 2024-01-31 DIAGNOSIS — H52.4 PRESBYOPIA: ICD-10-CM

## 2024-01-31 PROCEDURE — 92083 EXTENDED VISUAL FIELD XM: CPT | Performed by: OPHTHALMOLOGY

## 2024-01-31 PROCEDURE — 99214 OFFICE O/P EST MOD 30 MIN: CPT | Performed by: OPHTHALMOLOGY

## 2024-01-31 PROCEDURE — 92015 DETERMINE REFRACTIVE STATE: CPT | Performed by: OPHTHALMOLOGY

## 2024-01-31 PROCEDURE — 92133 CPTRZD OPH DX IMG PST SGM ON: CPT | Performed by: OPHTHALMOLOGY

## 2024-01-31 ASSESSMENT — TONOMETRY
OD_IOP_MMHG: 11
OS_IOP_MMHG: 12
IOP_METHOD: GOLDMANN APPLANATION

## 2024-01-31 ASSESSMENT — ENCOUNTER SYMPTOMS
ALLERGIC/IMMUNOLOGIC NEGATIVE: 0
CONSTITUTIONAL NEGATIVE: 0
PSYCHIATRIC NEGATIVE: 0
CARDIOVASCULAR NEGATIVE: 0
EYES NEGATIVE: 1
GASTROINTESTINAL NEGATIVE: 0
RESPIRATORY NEGATIVE: 0
MUSCULOSKELETAL NEGATIVE: 0
NEUROLOGICAL NEGATIVE: 0
HEMATOLOGIC/LYMPHATIC NEGATIVE: 0
ENDOCRINE NEGATIVE: 0

## 2024-01-31 ASSESSMENT — VISUAL ACUITY
OD_SC: 20/25+
OS_SC: 20/30+
METHOD: SNELLEN - LINEAR

## 2024-01-31 ASSESSMENT — REFRACTION_MANIFEST
OS_ADD: +2.50
OD_ADD: +2.50
OD_CYLINDER: -0.50
OS_AXIS: 135
OD_AXIS: 100
OS_SPHERE: PLANO
OS_CYLINDER: -0.75
OD_SPHERE: -0.25

## 2024-01-31 ASSESSMENT — REFRACTION_WEARINGRX
OS_SPHERE: OTC NVO
OD_SPHERE: OTC NVO

## 2024-02-02 PROBLEM — H52.4 PRESBYOPIA: Status: ACTIVE | Noted: 2024-02-02

## 2024-02-02 PROBLEM — Z79.899 LONG-TERM USE OF HIGH-RISK MEDICATION: Status: ACTIVE | Noted: 2024-02-02

## 2024-02-02 PROBLEM — H26.491 PCO (POSTERIOR CAPSULAR OPACIFICATION), RIGHT: Status: ACTIVE | Noted: 2023-10-30

## 2024-02-02 PROBLEM — H26.492 PCO (POSTERIOR CAPSULAR OPACIFICATION), LEFT: Status: ACTIVE | Noted: 2024-02-02

## 2024-02-05 ENCOUNTER — LAB (OUTPATIENT)
Dept: LAB | Facility: LAB | Age: 69
End: 2024-02-05
Payer: MEDICARE

## 2024-02-05 DIAGNOSIS — E78.00 PURE HYPERCHOLESTEROLEMIA: ICD-10-CM

## 2024-02-05 LAB
ALBUMIN SERPL BCP-MCNC: 4.3 G/DL (ref 3.4–5)
ALP SERPL-CCNC: 112 U/L (ref 33–136)
ALT SERPL W P-5'-P-CCNC: 28 U/L (ref 7–45)
ANION GAP SERPL CALC-SCNC: 12 MMOL/L (ref 10–20)
AST SERPL W P-5'-P-CCNC: 40 U/L (ref 9–39)
BILIRUB SERPL-MCNC: 1 MG/DL (ref 0–1.2)
BUN SERPL-MCNC: 14 MG/DL (ref 6–23)
CALCIUM SERPL-MCNC: 8.9 MG/DL (ref 8.6–10.3)
CHLORIDE SERPL-SCNC: 100 MMOL/L (ref 98–107)
CHOLEST SERPL-MCNC: 175 MG/DL (ref 0–199)
CHOLESTEROL/HDL RATIO: 3.5
CO2 SERPL-SCNC: 27 MMOL/L (ref 21–32)
CREAT SERPL-MCNC: 1.1 MG/DL (ref 0.5–1.05)
EGFRCR SERPLBLD CKD-EPI 2021: 55 ML/MIN/1.73M*2
GLUCOSE SERPL-MCNC: 109 MG/DL (ref 74–99)
HDLC SERPL-MCNC: 50.6 MG/DL
LDLC SERPL CALC-MCNC: 81 MG/DL
NON HDL CHOLESTEROL: 124 MG/DL (ref 0–149)
POTASSIUM SERPL-SCNC: 4.5 MMOL/L (ref 3.5–5.3)
PROT SERPL-MCNC: 6.5 G/DL (ref 6.4–8.2)
SODIUM SERPL-SCNC: 134 MMOL/L (ref 136–145)
TRIGL SERPL-MCNC: 215 MG/DL (ref 0–149)
VLDL: 43 MG/DL (ref 0–40)

## 2024-02-05 PROCEDURE — 80053 COMPREHEN METABOLIC PANEL: CPT

## 2024-02-05 PROCEDURE — 80061 LIPID PANEL: CPT

## 2024-02-05 PROCEDURE — 36415 COLL VENOUS BLD VENIPUNCTURE: CPT

## 2024-02-12 ENCOUNTER — OFFICE VISIT (OUTPATIENT)
Dept: PRIMARY CARE | Facility: CLINIC | Age: 69
End: 2024-02-12
Payer: MEDICARE

## 2024-02-12 VITALS
DIASTOLIC BLOOD PRESSURE: 58 MMHG | BODY MASS INDEX: 26.64 KG/M2 | HEART RATE: 65 BPM | WEIGHT: 141 LBS | OXYGEN SATURATION: 95 % | TEMPERATURE: 96.3 F | SYSTOLIC BLOOD PRESSURE: 100 MMHG

## 2024-02-12 DIAGNOSIS — G89.29 CHRONIC BILATERAL LOW BACK PAIN WITH BILATERAL SCIATICA: ICD-10-CM

## 2024-02-12 DIAGNOSIS — Z00.00 MEDICARE ANNUAL WELLNESS VISIT, SUBSEQUENT: Primary | ICD-10-CM

## 2024-02-12 DIAGNOSIS — M54.42 CHRONIC BILATERAL LOW BACK PAIN WITH BILATERAL SCIATICA: ICD-10-CM

## 2024-02-12 DIAGNOSIS — I25.708 ATHEROSCLEROSIS OF CORONARY ARTERY BYPASS GRAFT(S), UNSPECIFIED, WITH OTHER FORMS OF ANGINA PECTORIS (CMS-HCC): ICD-10-CM

## 2024-02-12 DIAGNOSIS — F33.9 DEPRESSION, RECURRENT (CMS-HCC): ICD-10-CM

## 2024-02-12 DIAGNOSIS — I47.29 VENTRICULAR TACHYCARDIA (PAROXYSMAL) (MULTI): ICD-10-CM

## 2024-02-12 DIAGNOSIS — M54.41 CHRONIC BILATERAL LOW BACK PAIN WITH BILATERAL SCIATICA: ICD-10-CM

## 2024-02-12 DIAGNOSIS — G47.33 OSA ON CPAP: ICD-10-CM

## 2024-02-12 DIAGNOSIS — F41.8 MIXED ANXIETY DEPRESSIVE DISORDER: ICD-10-CM

## 2024-02-12 DIAGNOSIS — Z95.0 PRESENCE OF CARDIAC PACEMAKER: ICD-10-CM

## 2024-02-12 DIAGNOSIS — E78.00 PURE HYPERCHOLESTEROLEMIA: ICD-10-CM

## 2024-02-12 DIAGNOSIS — I50.22 CHRONIC SYSTOLIC HEART FAILURE (MULTI): ICD-10-CM

## 2024-02-12 PROBLEM — G82.20 PARAPARESIS (MULTI): Status: RESOLVED | Noted: 2023-10-30 | Resolved: 2024-02-12

## 2024-02-12 PROCEDURE — 1123F ACP DISCUSS/DSCN MKR DOCD: CPT | Performed by: INTERNAL MEDICINE

## 2024-02-12 PROCEDURE — 1160F RVW MEDS BY RX/DR IN RCRD: CPT | Performed by: INTERNAL MEDICINE

## 2024-02-12 PROCEDURE — G0439 PPPS, SUBSEQ VISIT: HCPCS | Performed by: INTERNAL MEDICINE

## 2024-02-12 PROCEDURE — 3074F SYST BP LT 130 MM HG: CPT | Performed by: INTERNAL MEDICINE

## 2024-02-12 PROCEDURE — 1170F FXNL STATUS ASSESSED: CPT | Performed by: INTERNAL MEDICINE

## 2024-02-12 PROCEDURE — 1125F AMNT PAIN NOTED PAIN PRSNT: CPT | Performed by: INTERNAL MEDICINE

## 2024-02-12 PROCEDURE — 99214 OFFICE O/P EST MOD 30 MIN: CPT | Performed by: INTERNAL MEDICINE

## 2024-02-12 PROCEDURE — 1036F TOBACCO NON-USER: CPT | Performed by: INTERNAL MEDICINE

## 2024-02-12 PROCEDURE — 1159F MED LIST DOCD IN RCRD: CPT | Performed by: INTERNAL MEDICINE

## 2024-02-12 PROCEDURE — 1158F ADVNC CARE PLAN TLK DOCD: CPT | Performed by: INTERNAL MEDICINE

## 2024-02-12 PROCEDURE — 3078F DIAST BP <80 MM HG: CPT | Performed by: INTERNAL MEDICINE

## 2024-02-12 RX ORDER — GABAPENTIN 300 MG/1
300-600 CAPSULE ORAL DAILY PRN
Qty: 180 CAPSULE | Refills: 1 | Status: SHIPPED | OUTPATIENT
Start: 2024-02-12

## 2024-02-12 RX ORDER — SERTRALINE HYDROCHLORIDE 100 MG/1
100 TABLET, FILM COATED ORAL DAILY
Qty: 90 TABLET | Refills: 1 | Status: SHIPPED | OUTPATIENT
Start: 2024-02-12

## 2024-02-12 RX ORDER — SERTRALINE HYDROCHLORIDE 50 MG/1
50 TABLET, FILM COATED ORAL DAILY
Qty: 90 TABLET | Refills: 1 | Status: SHIPPED | OUTPATIENT
Start: 2024-02-12

## 2024-02-12 ASSESSMENT — ACTIVITIES OF DAILY LIVING (ADL)
TAKING_MEDICATION: INDEPENDENT
DRESSING: INDEPENDENT
MANAGING_FINANCES: INDEPENDENT
GROCERY_SHOPPING: INDEPENDENT
DOING_HOUSEWORK: INDEPENDENT
BATHING: INDEPENDENT

## 2024-02-12 ASSESSMENT — ENCOUNTER SYMPTOMS
CONSTITUTIONAL NEGATIVE: 1
RESPIRATORY NEGATIVE: 1
GASTROINTESTINAL NEGATIVE: 1
DEPRESSION: 1
ENDOCRINE NEGATIVE: 1
MUSCULOSKELETAL NEGATIVE: 1
PSYCHIATRIC NEGATIVE: 1
LOSS OF SENSATION IN FEET: 1
NEUROLOGICAL NEGATIVE: 1
CARDIOVASCULAR NEGATIVE: 1
EYES NEGATIVE: 1
HEMATOLOGIC/LYMPHATIC NEGATIVE: 1
ALLERGIC/IMMUNOLOGIC NEGATIVE: 1
OCCASIONAL FEELINGS OF UNSTEADINESS: 0

## 2024-02-12 ASSESSMENT — PATIENT HEALTH QUESTIONNAIRE - PHQ9
2. FEELING DOWN, DEPRESSED OR HOPELESS: NEARLY EVERY DAY
9. THOUGHTS THAT YOU WOULD BE BETTER OFF DEAD, OR OF HURTING YOURSELF: NOT AT ALL
7. TROUBLE CONCENTRATING ON THINGS, SUCH AS READING THE NEWSPAPER OR WATCHING TELEVISION: NEARLY EVERY DAY
8. MOVING OR SPEAKING SO SLOWLY THAT OTHER PEOPLE COULD HAVE NOTICED. OR THE OPPOSITE, BEING SO FIGETY OR RESTLESS THAT YOU HAVE BEEN MOVING AROUND A LOT MORE THAN USUAL: NOT AT ALL
10. IF YOU CHECKED OFF ANY PROBLEMS, HOW DIFFICULT HAVE THESE PROBLEMS MADE IT FOR YOU TO DO YOUR WORK, TAKE CARE OF THINGS AT HOME, OR GET ALONG WITH OTHER PEOPLE: VERY DIFFICULT
4. FEELING TIRED OR HAVING LITTLE ENERGY: NEARLY EVERY DAY
1. LITTLE INTEREST OR PLEASURE IN DOING THINGS: NEARLY EVERY DAY
3. TROUBLE FALLING OR STAYING ASLEEP OR SLEEPING TOO MUCH: NEARLY EVERY DAY
SUM OF ALL RESPONSES TO PHQ9 QUESTIONS 1 AND 2: 6
SUM OF ALL RESPONSES TO PHQ QUESTIONS 1-9: 19
5. POOR APPETITE OR OVEREATING: NEARLY EVERY DAY
6. FEELING BAD ABOUT YOURSELF - OR THAT YOU ARE A FAILURE OR HAVE LET YOURSELF OR YOUR FAMILY DOWN: SEVERAL DAYS

## 2024-02-12 NOTE — PROGRESS NOTES
Subjective   Patient ID: FAYE Watson is a 68 y.o. female who presents for 6 month follow up  and Medicare Annual Wellness Visit Subsequent.  Patient presents today in follow up re:   mixed anxiety-depressive disorder.    Anxiety symptoms have been overall well controlled with current medication therapy.  No adverse effects of medication reported.  No new or associated issues reported.    Depressive condition has been well controlled with current medication therapy.  Patient denies side effects of medication.  Denies any exacerbation of symptoms other than with increased stress.  No other associated complaints voiced today.         Review of Systems   Constitutional: Negative.    HENT: Negative.     Eyes: Negative.    Respiratory: Negative.     Cardiovascular: Negative.    Gastrointestinal: Negative.    Endocrine: Negative.    Genitourinary: Negative.    Musculoskeletal: Negative.    Skin: Negative.    Allergic/Immunologic: Negative.    Neurological: Negative.    Hematological: Negative.    Psychiatric/Behavioral: Negative.         Objective     Blood pressure 100/58, pulse 65, temperature 35.7 °C (96.3 °F), temperature source Temporal, weight 64 kg (141 lb), SpO2 95 %.   Physical Exam  Vitals reviewed.   Constitutional:       Appearance: Normal appearance.   Neck:      Vascular: No carotid bruit.   Cardiovascular:      Rate and Rhythm: Normal rate and regular rhythm.      Pulses: Normal pulses.      Heart sounds: Normal heart sounds. No murmur heard.  Pulmonary:      Effort: Pulmonary effort is normal.      Breath sounds: Normal breath sounds. No wheezing or rales.   Abdominal:      General: Abdomen is flat. There is no distension.      Palpations: Abdomen is soft.      Tenderness: There is no abdominal tenderness.   Musculoskeletal:         General: Normal range of motion.      Cervical back: Normal range of motion and neck supple.   Skin:     General: Skin is warm and dry.   Neurological:      General: No  focal deficit present.      Mental Status: She is alert and oriented to person, place, and time.   Psychiatric:         Mood and Affect: Mood normal.         Behavior: Behavior normal.         Assessment/Plan   Problem List Items Addressed This Visit       Hyperlipidemia    Mixed anxiety depressive disorder    Relevant Medications    sertraline (Zoloft) 50 mg tablet    sertraline (Zoloft) 100 mg tablet    RODRI on CPAP    Presence of cardiac pacemaker    Ventricular tachycardia (paroxysmal) (CMS/HCC)    Chronic systolic heart failure (CMS/HCC)    Chronic bilateral low back pain with bilateral sciatica    Relevant Medications    gabapentin (Neurontin) 300 mg capsule    Depression, recurrent (CMS/HCC)     Other Visit Diagnoses       Medicare annual wellness visit, subsequent    -  Primary    Atherosclerosis of coronary artery bypass graft(s), unspecified, with other forms of angina pectoris (CMS/[facility])              Medicare annual wellness completed with no new findings or issues identified.  Patient has documented advanced care planning and POA in place  Anxiety symptoms well controlled and doing well on current therapy.  Will continue present therapy and follow clinically.  Depressive condition well compensated on current medication.  Will continue present therapy and follow clinically.  Back pain overall well compensated on current therapy.  Will continue present therapy and follow.  She questions applying for disability  She had injection therapy for trigger finger per Dr. Barakat with good results.  She has c/o difficulty with sleep.  She does not use CPAP.  She states she awakens frequently and can't get back to sleep.  Subsequently, she feels fatigued during the day.  She states this is an intermittent issue that will come on for a week or so then ronen for awhile.  She continues follow up with Cardiology and Vascular services for these issues.  It is noted that she was to be started on Nexletol therapy at last  Cardio visit but she states it was much too expensive.  I assured her that this is fine as her LDL is <100 anyway which is the ultimate study proven goal.    Mammogram done in September was negative.  Lab values are reviewed with all parameters either at goal or WNL.    > 30 minutes was spent with the patient today, the majority of which was spent on review of history and medications as well as counselling on care plan and/or coordination of care.     Follow up in 6 months

## 2024-02-14 ENCOUNTER — APPOINTMENT (OUTPATIENT)
Dept: CARDIOLOGY | Facility: HOSPITAL | Age: 69
End: 2024-02-14
Payer: MEDICARE

## 2024-02-27 ENCOUNTER — OFFICE VISIT (OUTPATIENT)
Dept: PRIMARY CARE | Facility: CLINIC | Age: 69
End: 2024-02-27
Payer: MEDICARE

## 2024-02-27 ENCOUNTER — TELEPHONE (OUTPATIENT)
Dept: PRIMARY CARE | Facility: CLINIC | Age: 69
End: 2024-02-27

## 2024-02-27 VITALS
DIASTOLIC BLOOD PRESSURE: 62 MMHG | BODY MASS INDEX: 27.4 KG/M2 | HEART RATE: 80 BPM | OXYGEN SATURATION: 99 % | WEIGHT: 145 LBS | SYSTOLIC BLOOD PRESSURE: 99 MMHG | TEMPERATURE: 97.3 F

## 2024-02-27 DIAGNOSIS — S05.02XA ABRASION OF LEFT CORNEA, INITIAL ENCOUNTER: Primary | ICD-10-CM

## 2024-02-27 DIAGNOSIS — H11.32 SUBCONJUNCTIVAL HEMORRHAGE OF LEFT EYE: ICD-10-CM

## 2024-02-27 PROCEDURE — 1125F AMNT PAIN NOTED PAIN PRSNT: CPT | Performed by: INTERNAL MEDICINE

## 2024-02-27 PROCEDURE — 1123F ACP DISCUSS/DSCN MKR DOCD: CPT | Performed by: INTERNAL MEDICINE

## 2024-02-27 PROCEDURE — 1036F TOBACCO NON-USER: CPT | Performed by: INTERNAL MEDICINE

## 2024-02-27 PROCEDURE — 1159F MED LIST DOCD IN RCRD: CPT | Performed by: INTERNAL MEDICINE

## 2024-02-27 PROCEDURE — 99213 OFFICE O/P EST LOW 20 MIN: CPT | Performed by: INTERNAL MEDICINE

## 2024-02-27 PROCEDURE — 1160F RVW MEDS BY RX/DR IN RCRD: CPT | Performed by: INTERNAL MEDICINE

## 2024-02-27 PROCEDURE — 3078F DIAST BP <80 MM HG: CPT | Performed by: INTERNAL MEDICINE

## 2024-02-27 PROCEDURE — 3074F SYST BP LT 130 MM HG: CPT | Performed by: INTERNAL MEDICINE

## 2024-02-27 RX ORDER — GENTAMICIN SULFATE 3 MG/ML
1 SOLUTION/ DROPS OPHTHALMIC 4 TIMES DAILY
Qty: 5 ML | Refills: 0 | Status: SHIPPED | OUTPATIENT
Start: 2024-02-27 | End: 2024-02-27 | Stop reason: SDUPTHER

## 2024-02-27 RX ORDER — GENTAMICIN SULFATE 3 MG/ML
1 SOLUTION/ DROPS OPHTHALMIC 4 TIMES DAILY
Qty: 5 ML | Refills: 0 | Status: SHIPPED | OUTPATIENT
Start: 2024-02-27 | End: 2024-03-05

## 2024-02-27 ASSESSMENT — ENCOUNTER SYMPTOMS
ALLERGIC/IMMUNOLOGIC NEGATIVE: 1
CARDIOVASCULAR NEGATIVE: 1
PSYCHIATRIC NEGATIVE: 1
RESPIRATORY NEGATIVE: 1
EYE REDNESS: 1
HEMATOLOGIC/LYMPHATIC NEGATIVE: 1
CONSTITUTIONAL NEGATIVE: 1
NEUROLOGICAL NEGATIVE: 1
GASTROINTESTINAL NEGATIVE: 1
MUSCULOSKELETAL NEGATIVE: 1
ENDOCRINE NEGATIVE: 1
EYE PAIN: 1

## 2024-02-27 NOTE — PROGRESS NOTES
Subjective   Patient ID: Shani Watson is a 68 y.o. female who presents for left eye issues .  Patient presents today with a complaint of eye irritation.    She states that she was playing with her dog 4 nights ago and he hit her left eye.  She had no immediate sequelae of this but the following morning noted a little streak of blood that over the next 2 days involved most of the dependent portion of the white of her eye.  She denies any discharge but states the eye is tender.    Close exam of the sclera reveals what appears to be a contusion just lateral to the iris of the left eye with a moderate subconjunctival hemorrhage involving the inferior portion of the eye.  Cornea appears clear and she denies any visual scotoma or change.        Review of Systems   Constitutional: Negative.    HENT: Negative.     Eyes:  Positive for pain and redness.   Respiratory: Negative.     Cardiovascular: Negative.    Gastrointestinal: Negative.    Endocrine: Negative.    Genitourinary: Negative.    Musculoskeletal: Negative.    Skin: Negative.    Allergic/Immunologic: Negative.    Neurological: Negative.    Hematological: Negative.    Psychiatric/Behavioral: Negative.         Objective     Blood pressure 99/62, pulse 80, temperature 36.3 °C (97.3 °F), temperature source Temporal, weight 65.8 kg (145 lb), SpO2 99 %.   Physical Exam  Constitutional:       Appearance: Normal appearance.   Eyes:      Comments: See HPI for description of exam   Pulmonary:      Effort: Pulmonary effort is normal.   Musculoskeletal:      Cervical back: Normal range of motion and neck supple.   Neurological:      General: No focal deficit present.      Mental Status: She is alert and oriented to person, place, and time.   Psychiatric:         Mood and Affect: Mood normal.         Thought Content: Thought content normal.         Assessment/Plan   Problem List Items Addressed This Visit    None  Visit Diagnoses       Abrasion of left cornea, initial  encounter    -  Primary    Relevant Medications    gentamicin (Garamycin) 0.3 % ophthalmic solution    Subconjunctival hemorrhage of left eye        Relevant Medications    gentamicin (Garamycin) 0.3 % ophthalmic solution          Will empirically treat with gentamicin ophth. solution to inhibit any potential infection.  She will let me know if not resolving.    Follow up as scheduled

## 2024-02-27 NOTE — TELEPHONE ENCOUNTER
Patient was just seen in the office today and the pharmacy did not receive her medication. The patient uses [facility]. The patient would like a paper prescription for the gentamicin if possible. Please call patient when ready for .

## 2024-03-13 ENCOUNTER — OFFICE VISIT (OUTPATIENT)
Dept: ORTHOPEDIC SURGERY | Facility: CLINIC | Age: 69
End: 2024-03-13
Payer: MEDICARE

## 2024-03-13 VITALS — HEIGHT: 61 IN | BODY MASS INDEX: 26.43 KG/M2 | WEIGHT: 140 LBS

## 2024-03-13 DIAGNOSIS — M65.331 TRIGGER FINGER, RIGHT MIDDLE FINGER: Primary | ICD-10-CM

## 2024-03-13 PROCEDURE — 1123F ACP DISCUSS/DSCN MKR DOCD: CPT | Performed by: ORTHOPAEDIC SURGERY

## 2024-03-13 PROCEDURE — 1036F TOBACCO NON-USER: CPT | Performed by: ORTHOPAEDIC SURGERY

## 2024-03-13 PROCEDURE — 99214 OFFICE O/P EST MOD 30 MIN: CPT | Performed by: ORTHOPAEDIC SURGERY

## 2024-03-13 PROCEDURE — 20550 NJX 1 TENDON SHEATH/LIGAMENT: CPT | Performed by: ORTHOPAEDIC SURGERY

## 2024-03-13 PROCEDURE — 1160F RVW MEDS BY RX/DR IN RCRD: CPT | Performed by: ORTHOPAEDIC SURGERY

## 2024-03-13 PROCEDURE — 1159F MED LIST DOCD IN RCRD: CPT | Performed by: ORTHOPAEDIC SURGERY

## 2024-03-13 RX ORDER — LIDOCAINE HYDROCHLORIDE 10 MG/ML
1 INJECTION INFILTRATION; PERINEURAL
Status: COMPLETED | OUTPATIENT
Start: 2024-03-13 | End: 2024-03-13

## 2024-03-13 RX ADMIN — LIDOCAINE HYDROCHLORIDE 1 ML: 10 INJECTION INFILTRATION; PERINEURAL at 15:20

## 2024-03-13 ASSESSMENT — PAIN - FUNCTIONAL ASSESSMENT: PAIN_FUNCTIONAL_ASSESSMENT: 0-10

## 2024-03-13 ASSESSMENT — PAIN SCALES - GENERAL: PAINLEVEL_OUTOF10: 5 - MODERATE PAIN

## 2024-03-13 NOTE — PROGRESS NOTES
68 y.o. female presents today for follow-up left long finger catching clicking locking and pain. The patient reports injection about 8 months ago that helped until recently and would like to try another. Pain is controlled. Patient reports no numbness and tingling.  Reports no previous surgeries or trauma to the area.  Reports pain worse with use, better at rest.   Pain dull ache, sharp at times when unlocks.     Review of Systems    Constitutional: see HPI, no fever, no chills, not feeling tired, no significant weight gain or weight loss.   Eyes: No vision changes  ENT: no nosebleeds.   Cardiovascular: no chest pain.   Respiratory: no shortness of breath and no cough.   Gastrointestinal: no abdominal pain, no nausea, no vomiting and no diarrhea.   Musculoskeletal: per HPI  Neurological: no headache, no gait disturbances  Psychiatric: no depression and no sleep disturbances.   Endocrine: no muscle weakness and no muscle cramps.   Hematologic/Lymphatic: no swollen glands and no tendency for easy bruising or excessive swelling.     Patient's past medical history, past surgical history, allergies, and medications have been reviewed unless otherwise noted in the chart.     Trigger Exam  Digit: Right long finger inspection:  no evidence of infection, no erythema, no edema, no ecchymosis, Palpation:  compartments are soft, TTP A1 pulley Range of Motion:  full composite finger flexion, Stability:  no finger instability, Strength:  5/5 strength with flexion and extension, Skin:  intact, Vascular:  capillary refill <2 seconds distally, Sensation:  sensation intact to light touch distally, Other:  no pain with palpation or motion proximally in the wrist.     Constitutional   General appearance: Alert and in no acute distress. Well developed, well nourished.    Eyes   External Eye, Conjunctiva and lids: Normal external exam - pupils were equal in size, round, reactive to light (PERRL) with normal accommodation and extraocular  movements intact (EOMI).   Ears, Nose, Mouth, and Throat   Hearing: Normal.   Neck   Neck: No neck mass was observed. Supple.   Pulmonary   Respiratory effort: No respiratory distress.   Cardiovascular   Examination of extremities: No peripheral edema.   Psychiatric   Judgment and insight: Intact.   Orientation to person, place, and time: Alert and oriented x 3.       Mood and affect: Normal.      Right long finger trigger  Based on the history, physical exam and imaging studies above, the patient's presentation is consistent with the above diagnosis.  I had a long discussion with the patient regarding their presentation and the treatment options.  We discussed initial nonoperative versus operative management options as well as potential further diagnostic imaging.  We again discussed her treatment options going forward along with their associated risks and benefits. After thorough discussion, the patient has elected to proceed with conservative management. All questions were answered to the patients satisfaction who seems satisfied with the plan.  They will call the office with any questions/concerns.    Discussion I discussed the diagnosis and treatment options with the patient today along with their associated risks and benefits. After thorough discussion, the patient has elected to proceed with a corticosteroid injection with Kenalog and Xylocaine, which was performed in the office today under aseptic technique and the patient tolerated the procedure well.          Ortho Injections:           Injection site right long finger A1 pulley. Medication 1 cc 1% Xylocaine, 1 cc 10 mg Kenalog, Injection given under sterile conditions. No immediate complications noted. Post injection instructions given.  Follow-up in 8 weeks as needed    Patient ID: Shani Watson is a 68 y.o. female.    Hand / UE Inj/Asp: R small A1 for trigger finger on 3/13/2024 3:20 PM  Indications: therapeutic  Details: 25 G needle, volar  approach  Medications: 1 mL lidocaine 10 mg/mL (1 %); 10 mg triamcinolone acetonide 10 mg/mL  Outcome: tolerated well, no immediate complications  Procedure, treatment alternatives, risks and benefits explained, specific risks discussed. Consent was given by the patient. Immediately prior to procedure a time out was called to verify the correct patient, procedure, equipment, support staff and site/side marked as required. Patient was prepped and draped in the usual sterile fashion.

## 2024-03-13 NOTE — PROGRESS NOTES
Follow up left long finger trigger follow up.  She was last injected on 07/26/2023.  She did have good relief until the winter, but her pain got much worse last month.  It has been locking a lot more and the pain is worse.

## 2024-03-14 ENCOUNTER — HOSPITAL ENCOUNTER (OUTPATIENT)
Dept: CARDIOLOGY | Facility: HOSPITAL | Age: 69
Discharge: HOME | End: 2024-03-14
Payer: MEDICARE

## 2024-03-14 DIAGNOSIS — I47.29 OTHER VENTRICULAR TACHYCARDIA (MULTI): ICD-10-CM

## 2024-03-14 DIAGNOSIS — I44.2 ATRIOVENTRICULAR BLOCK, COMPLETE (MULTI): ICD-10-CM

## 2024-03-14 DIAGNOSIS — Z95.810 CARDIAC RESYNCHRONIZATION THERAPY DEFIBRILLATOR (CRT-D) IN PLACE: ICD-10-CM

## 2024-03-14 PROCEDURE — 93296 REM INTERROG EVL PM/IDS: CPT

## 2024-03-21 DIAGNOSIS — E78.2 MIXED HYPERLIPIDEMIA: Primary | ICD-10-CM

## 2024-03-21 DIAGNOSIS — I50.20 SYSTOLIC HEART FAILURE, ACC/AHA STAGE C (MULTI): ICD-10-CM

## 2024-03-21 DIAGNOSIS — I10 BENIGN ESSENTIAL HYPERTENSION: ICD-10-CM

## 2024-03-21 NOTE — TELEPHONE ENCOUNTER
----- Message from Kat Fermin sent at 3/20/2024  3:54 PM EDT -----  Regarding: Med Refill.  Spironolactone sent to [facility]

## 2024-03-22 RX ORDER — SPIRONOLACTONE 25 MG/1
12.5 TABLET ORAL DAILY
Qty: 45 TABLET | Refills: 1 | Status: SHIPPED | OUTPATIENT
Start: 2024-03-22 | End: 2024-09-18

## 2024-03-22 RX ORDER — EZETIMIBE 10 MG/1
10 TABLET ORAL DAILY
Qty: 90 TABLET | Refills: 1 | Status: SHIPPED | OUTPATIENT
Start: 2024-03-22 | End: 2024-09-18

## 2024-03-22 NOTE — TELEPHONE ENCOUNTER
Last appointment: 11/6/23 with Demetra Duenas  Next appointment: 5/7/24 with Dr. Campa  Labs labs: CMP 2/5/24 K+ 4.5 Creat 1.10  Lipid 2/5/24    Will also need Zetia soon-last refilled 4/2023 90 days 3 refills

## 2024-03-25 ENCOUNTER — TELEPHONE (OUTPATIENT)
Dept: CARDIOLOGY | Facility: CLINIC | Age: 69
End: 2024-03-25

## 2024-03-25 ENCOUNTER — OFFICE VISIT (OUTPATIENT)
Dept: CARDIOLOGY | Facility: HOSPITAL | Age: 69
End: 2024-03-25
Payer: MEDICARE

## 2024-03-25 VITALS
DIASTOLIC BLOOD PRESSURE: 99 MMHG | BODY MASS INDEX: 27 KG/M2 | OXYGEN SATURATION: 98 % | WEIGHT: 143 LBS | SYSTOLIC BLOOD PRESSURE: 138 MMHG | HEART RATE: 84 BPM | HEIGHT: 61 IN

## 2024-03-25 DIAGNOSIS — I25.5 ISCHEMIC CARDIOMYOPATHY: ICD-10-CM

## 2024-03-25 DIAGNOSIS — I47.29 VENTRICULAR TACHYCARDIA (PAROXYSMAL) (MULTI): Primary | ICD-10-CM

## 2024-03-25 DIAGNOSIS — Z79.899 ON AMIODARONE THERAPY: ICD-10-CM

## 2024-03-25 DIAGNOSIS — I47.29 PAROXYSMAL VENTRICULAR TACHYCARDIA (MULTI): Primary | ICD-10-CM

## 2024-03-25 PROCEDURE — 99214 OFFICE O/P EST MOD 30 MIN: CPT | Performed by: INTERNAL MEDICINE

## 2024-03-25 PROCEDURE — 1036F TOBACCO NON-USER: CPT | Performed by: INTERNAL MEDICINE

## 2024-03-25 PROCEDURE — 1159F MED LIST DOCD IN RCRD: CPT | Performed by: INTERNAL MEDICINE

## 2024-03-25 PROCEDURE — 3080F DIAST BP >= 90 MM HG: CPT | Performed by: INTERNAL MEDICINE

## 2024-03-25 PROCEDURE — 93005 ELECTROCARDIOGRAM TRACING: CPT | Performed by: INTERNAL MEDICINE

## 2024-03-25 PROCEDURE — 3075F SYST BP GE 130 - 139MM HG: CPT | Performed by: INTERNAL MEDICINE

## 2024-03-25 PROCEDURE — 1123F ACP DISCUSS/DSCN MKR DOCD: CPT | Performed by: INTERNAL MEDICINE

## 2024-03-25 PROCEDURE — 1160F RVW MEDS BY RX/DR IN RCRD: CPT | Performed by: INTERNAL MEDICINE

## 2024-03-25 RX ORDER — AMIODARONE HYDROCHLORIDE 100 MG/1
100 TABLET ORAL DAILY
Qty: 90 TABLET | Refills: 3 | Status: SHIPPED | OUTPATIENT
Start: 2024-03-25 | End: 2025-03-25

## 2024-03-25 ASSESSMENT — ENCOUNTER SYMPTOMS
DEPRESSION: 0
LOSS OF SENSATION IN FEET: 0
OCCASIONAL FEELINGS OF UNSTEADINESS: 0

## 2024-03-25 NOTE — TELEPHONE ENCOUNTER
3/26/24  0942  Called patient; no answer. Left voice message for patient to to return call for Cath procedure discussion.    3/25/24  Received Secure chat from Dr. Campa and Dr. Macias:    Hi, I saw this patient in clinic today. She is having slow VTs. She is also complaining of chest pressure when is out walking her dog. the last stress test from October showed LAD territory ischemia and a big scar in the circumflex territory. She needs a VT ablation but I think will need to make sure there is no ongoing anterior wall ischemia before proceeding. Please let me know your thoughts. Thx    AG  Liban Campa MD  We will set her up for a cath      You were added by Liban Camap MD.  19 mins  AG  MD Ernesto Garrido, can we set her up for left heart cath [facility] MD Gilberto  thx     Heart cath, labs and EKG ordered.    Called patient; no answer. Left voice message for patient to return call to discuss possible heart cath.      Mallory

## 2024-03-25 NOTE — PROGRESS NOTES
Referred by Manuel Gibson MD provider found for   Chief Complaint   Patient presents with    Cardiomyopathy     VT        Shani Watson is a 68 y.o. year old female patient with h/o ICM with CRT-D device, PMHx of VT. Presents for follow up.      PMHx/PSHx: As above    FamHx: unremarkable     Allergies:  Allergies   Allergen Reactions    Bactrim [Sulfamethoxazole-Trimethoprim] Rash    Erythromycin Other    Amoxicillin GI Upset    Milk GI Upset    Statins-Hmg-Coa Reductase Inhibitors Diarrhea    Valacyclovir Other     Ha    Codeine Anxiety and Other     Reaction from Community: Other(See Desc)  Mood alteration    Diazepam Anxiety    Latex Rash    Meperidine Other and Rash     Reaction from Community: Other(See Desc) mood alteration    Penicillins GI Upset    Sulfa (Sulfonamide Antibiotics) Other and Rash     rash        Review of Systems    Constitutional: not feeling tired.   Eyes: no eyesight problems.   ENT: no hearing loss and no nosebleeds.   Cardiovascular: no intermittent leg claudication and as noted in HPI.   Respiratory: no chronic cough and no shortness of breath.   Gastrointestinal: no change in bowel habits and no blood in stools.   Genitourinary: no urinary frequency and no hematuria.   Skin: no skin rashes.   Neurological: no seizures and no frequent falls.   Psychiatric: no depression and not suicidal.   All other systems have been reviewed and are negative for complaint.     Outpatient Medications:  Current Outpatient Medications   Medication Instructions    acetaminophen 500 mg capsule oral    amiodarone (PACERONE) 100 mg, oral, Daily    ezetimibe (ZETIA) 10 mg, oral, Daily    fluticasone (Flonase) 50 mcg/actuation nasal spray 1 spray, nasal, 2 times daily    gabapentin (NEURONTIN) 300-600 mg, oral, Daily PRN    metoprolol succinate XL (Toprol-XL) 50 mg 24 hr tablet 1 tablet, oral, Daily    multivitamin-min-iron-FA-vit K (Adults Multivitamin) 18 mg iron-400 mcg-25 mcg tablet 1 tablet, oral,  "Daily    prasugrel (Effient) 10 mg tablet 1 tablet, oral, Daily    sertraline (ZOLOFT) 50 mg, oral, Daily    sertraline (ZOLOFT) 100 mg, oral, Daily    spironolactone (ALDACTONE) 12.5 mg, oral, Daily    tiZANidine (ZANAFLEX) 4 mg, oral, Every 8 hours PRN         Last Recorded Vitals:      9/25/2023    10:29 AM 10/31/2023     9:42 AM 11/6/2023    10:51 AM 2/12/2024    10:02 AM 2/27/2024     2:07 PM 3/13/2024     3:11 PM 3/25/2024     1:05 PM   Vitals   Systolic 108 120 118 100 99  138   Diastolic 68 58 84 58 62  99   Heart Rate 68 63 87 65 80  84   Temp  35.7 °C (96.2 °F)  35.7 °C (96.3 °F) 36.3 °C (97.3 °F)     Resp 18         Height (in) 1.651 m (5' 5\")  1.549 m (5' 1\")   1.549 m (5' 1\") 1.549 m (5' 1\")   Weight (lb) 138.19 141.8 139.8 141 145 140 143   BMI 23 kg/m2 23.6 kg/m2 26.41 kg/m2 26.64 kg/m2 27.4 kg/m2 26.45 kg/m2 27.02 kg/m2   BSA (m2) 1.7 m2 1.72 m2 1.65 m2 1.66 m2 1.68 m2 1.65 m2 1.67 m2   Visit Report  Report Report Report Report Report Report    Visit Vitals  BP (!) 138/99 (BP Location: Left arm, Patient Position: Sitting, BP Cuff Size: Adult) Comment: nurse notified   Pulse 84   Ht 1.549 m (5' 1\")   Wt 64.9 kg (143 lb)   SpO2 98%   BMI 27.02 kg/m²   OB Status Postmenopausal   Smoking Status Never   BSA 1.67 m²        Physical Exam:  Constitutional: alert and in no acute distress.   Eyes: no erythema, swelling or discharge from the eye .   Neck: neck is supple, symmetric, trachea midline, no masses  and no thyromegaly .   Pulmonary: no increased work of breathing or signs of respiratory distress  and lungs clear to auscultation.    Cardiovascular: carotid pulses 2+ bilaterally with no bruit , JVP was normal, no thrills , regular rhythm, normal S1 and S2, no murmurs , pedal pulses 2+ bilaterally  and no edema .   Abdomen: abdomen non-tender, no masses  and no hepatomegaly .   Skin: skin warm and dry, normal skin turgor .   Psychiatric judgment and insight is normal  and oriented to person, place and " time .        Assessment/Plan   Problem List Items Addressed This Visit             ICD-10-CM    Ischemic cardiomyopathy I25.5    Relevant Orders    ECG 12 lead (Clinic Performed)    Ventricular tachycardia (paroxysmal) (CMS/HCC) - Primary I47.29    Relevant Medications    amiodarone (Pacerone) 100 mg tablet    Other Relevant Orders    ECG 12 lead (Clinic Performed)     Other Visit Diagnoses         Codes    On amiodarone therapy     Z79.899    Relevant Orders    Complete Pulmonary Function Test (Spirometry/DLCO/Lung Volumes)            Shani Watson is a 68 y.o. year old female patient with h/o ICM with CRT-D device, PMHx of VT. Presents for follow up.    The patient reprots being symptomatic with activity like walking her dog. She describes chest pressure that requires her to rest before she can continue the activity. Device interrogation showed a normal device function with high burden of BiV pacing, with evidence of multiple runs of slow sustained VT. I think this is most likely scar related VT and will benefit from VT ablation with lat scar modification. There was also some concern about LAD territory ischemia so I contacted Dr. Campa, her cardiologist and he will perform a LHC before scheduling her ablation.         Manuel Macias MD  Cardiac Electrophysiology      Thank you very much for allowing me to participate in the care of this pleasant patient. Please do not hesitate to contact me with any further questions or concerns regarding his care.    **Disclaimer: This note was dictated by speech recognition, and every effort has been made to prevent any error in transcription, however minor errors may be present**

## 2024-03-26 PROBLEM — I47.29 PAROXYSMAL VENTRICULAR TACHYCARDIA (MULTI): Status: ACTIVE | Noted: 2024-03-25

## 2024-03-26 PROBLEM — I47.20 PAROXYSMAL VENTRICULAR TACHYCARDIA: Status: ACTIVE | Noted: 2024-03-25

## 2024-03-26 LAB
ATRIAL RATE: 84 BPM
P AXIS: 91 DEGREES
P OFFSET: 174 MS
P ONSET: 111 MS
PR INTERVAL: 156 MS
Q ONSET: 189 MS
QRS COUNT: 14 BEATS
QRS DURATION: 166 MS
QT INTERVAL: 446 MS
QTC CALCULATION(BAZETT): 527 MS
QTC FREDERICIA: 498 MS
R AXIS: -78 DEGREES
T AXIS: 95 DEGREES
T OFFSET: 412 MS
VENTRICULAR RATE: 84 BPM

## 2024-03-26 NOTE — TELEPHONE ENCOUNTER
Called and spoke with patient 3/26 12:20 PM to schedule C w/ Dr. Campa. Patient agreeable to 4/11 7:30 AM [facility], scheduled with Cardiology. Dana GALINDO notified. Andrade NEWTON

## 2024-03-27 ENCOUNTER — TELEPHONE (OUTPATIENT)
Dept: CARDIOLOGY | Facility: CLINIC | Age: 69
End: 2024-03-27
Payer: MEDICARE

## 2024-03-27 NOTE — TELEPHONE ENCOUNTER
3/27/24  0938  This is a plan of care note for a 68 year old female      patient with a h/o CM, HLP, CAD, VT, and an ICD placement  who was referred for a heart catheterization due to VT    Patient has no allergy to IV dye  Patient has a slightly elevated serum creatinine  Patient has recent EKG, but needs labs; ordered  Patient does not take metformin  Patient does not take OAC    Called to give pre procedure instructions for heart cath  to include  Date of and show time pf procedure  NPO after midnight x for Effient and metoprolol (patient takes this at night; told her to take it as HS)  To have labs done  To have a ride due to sedation  To shower the night before and wear loose comfortable clothes  To bring an overnight bag in case of overnight stay  To bring an ID card, insurance card and list of medications.    Patient verbalized understanding of instructions.      ----- Message from Kvng Arteaga sent at 3/25/2024  4:07 PM EDT -----  Regarding: Patient was returning your call  She just called back and said call her back

## 2024-04-01 ENCOUNTER — TELEPHONE (OUTPATIENT)
Dept: CARDIOLOGY | Facility: CLINIC | Age: 69
End: 2024-04-01
Payer: MEDICARE

## 2024-04-01 NOTE — TELEPHONE ENCOUNTER
4/1/24  4547  Called and informed patient of date, show time and procedure time of heart cath.     Patient verbalized understanding; denied questions for procedure and reported she received her approval letter from her insurance today.    ----- Message from Kat Trejo sent at 3/26/2024 12:39 PM EDT -----  Regarding: [facility] w/ Dr. Campa - Reminder/Instructions  [facility] - Thursday April 11th     [facility] w/ no LV    7:30 AM - Arrive 6:30 AM     No hydration needed    Thank you!

## 2024-04-09 ENCOUNTER — LAB (OUTPATIENT)
Dept: LAB | Facility: LAB | Age: 69
End: 2024-04-09
Payer: MEDICARE

## 2024-04-09 DIAGNOSIS — I47.29 PAROXYSMAL VENTRICULAR TACHYCARDIA (MULTI): ICD-10-CM

## 2024-04-09 LAB
ANION GAP SERPL CALC-SCNC: 9 MMOL/L (ref 10–20)
BUN SERPL-MCNC: 16 MG/DL (ref 6–23)
CALCIUM SERPL-MCNC: 9.1 MG/DL (ref 8.6–10.3)
CHLORIDE SERPL-SCNC: 99 MMOL/L (ref 98–107)
CO2 SERPL-SCNC: 27 MMOL/L (ref 21–32)
CREAT SERPL-MCNC: 1.14 MG/DL (ref 0.5–1.05)
EGFRCR SERPLBLD CKD-EPI 2021: 52 ML/MIN/1.73M*2
ERYTHROCYTE [DISTWIDTH] IN BLOOD BY AUTOMATED COUNT: 13.7 % (ref 11.5–14.5)
GLUCOSE SERPL-MCNC: 117 MG/DL (ref 74–99)
HCT VFR BLD AUTO: 43.4 % (ref 36–46)
HGB BLD-MCNC: 14.7 G/DL (ref 12–16)
MCH RBC QN AUTO: 30 PG (ref 26–34)
MCHC RBC AUTO-ENTMCNC: 33.9 G/DL (ref 32–36)
MCV RBC AUTO: 89 FL (ref 80–100)
NRBC BLD-RTO: 0 /100 WBCS (ref 0–0)
PLATELET # BLD AUTO: 208 X10*3/UL (ref 150–450)
POTASSIUM SERPL-SCNC: 4.2 MMOL/L (ref 3.5–5.3)
RBC # BLD AUTO: 4.9 X10*6/UL (ref 4–5.2)
SODIUM SERPL-SCNC: 131 MMOL/L (ref 136–145)
WBC # BLD AUTO: 8.6 X10*3/UL (ref 4.4–11.3)

## 2024-04-09 PROCEDURE — 36415 COLL VENOUS BLD VENIPUNCTURE: CPT

## 2024-04-09 PROCEDURE — 85027 COMPLETE CBC AUTOMATED: CPT

## 2024-04-09 PROCEDURE — 80048 BASIC METABOLIC PNL TOTAL CA: CPT

## 2024-04-11 ENCOUNTER — HOSPITAL ENCOUNTER (OUTPATIENT)
Facility: HOSPITAL | Age: 69
Setting detail: OUTPATIENT SURGERY
Discharge: HOME | End: 2024-04-11
Attending: INTERNAL MEDICINE | Admitting: INTERNAL MEDICINE
Payer: MEDICARE

## 2024-04-11 VITALS
WEIGHT: 144 LBS | OXYGEN SATURATION: 97 % | RESPIRATION RATE: 16 BRPM | TEMPERATURE: 97 F | BODY MASS INDEX: 27.21 KG/M2 | HEART RATE: 60 BPM | DIASTOLIC BLOOD PRESSURE: 80 MMHG | SYSTOLIC BLOOD PRESSURE: 120 MMHG

## 2024-04-11 DIAGNOSIS — I47.29 PAROXYSMAL VENTRICULAR TACHYCARDIA (MULTI): Primary | ICD-10-CM

## 2024-04-11 DIAGNOSIS — I25.729 ATHEROSCLEROSIS OF AUTOLOGOUS ARTERY CORONARY ARTERY BYPASS GRAFT(S) WITH UNSPECIFIED ANGINA PECTORIS (CMS-HCC): ICD-10-CM

## 2024-04-11 PROCEDURE — 99153 MOD SED SAME PHYS/QHP EA: CPT | Performed by: INTERNAL MEDICINE

## 2024-04-11 PROCEDURE — C1894 INTRO/SHEATH, NON-LASER: HCPCS | Performed by: INTERNAL MEDICINE

## 2024-04-11 PROCEDURE — 7100000009 HC PHASE TWO TIME - INITIAL BASE CHARGE: Performed by: INTERNAL MEDICINE

## 2024-04-11 PROCEDURE — 99152 MOD SED SAME PHYS/QHP 5/>YRS: CPT | Performed by: INTERNAL MEDICINE

## 2024-04-11 PROCEDURE — 2550000001 HC RX 255 CONTRASTS: Performed by: INTERNAL MEDICINE

## 2024-04-11 PROCEDURE — 2780000003 HC OR 278 NO HCPCS: Performed by: INTERNAL MEDICINE

## 2024-04-11 PROCEDURE — 2720000007 HC OR 272 NO HCPCS: Performed by: INTERNAL MEDICINE

## 2024-04-11 PROCEDURE — 7100000010 HC PHASE TWO TIME - EACH INCREMENTAL 1 MINUTE: Performed by: INTERNAL MEDICINE

## 2024-04-11 PROCEDURE — C1769 GUIDE WIRE: HCPCS | Performed by: INTERNAL MEDICINE

## 2024-04-11 PROCEDURE — 2500000004 HC RX 250 GENERAL PHARMACY W/ HCPCS (ALT 636 FOR OP/ED): Performed by: INTERNAL MEDICINE

## 2024-04-11 PROCEDURE — 93459 L HRT ART/GRFT ANGIO: CPT | Performed by: INTERNAL MEDICINE

## 2024-04-11 PROCEDURE — 2500000005 HC RX 250 GENERAL PHARMACY W/O HCPCS: Performed by: INTERNAL MEDICINE

## 2024-04-11 PROCEDURE — C1760 CLOSURE DEV, VASC: HCPCS | Performed by: INTERNAL MEDICINE

## 2024-04-11 PROCEDURE — 93458 L HRT ARTERY/VENTRICLE ANGIO: CPT | Performed by: INTERNAL MEDICINE

## 2024-04-11 RX ORDER — LIDOCAINE HYDROCHLORIDE 20 MG/ML
INJECTION, SOLUTION INFILTRATION; PERINEURAL AS NEEDED
Status: DISCONTINUED | OUTPATIENT
Start: 2024-04-11 | End: 2024-04-11 | Stop reason: HOSPADM

## 2024-04-11 RX ORDER — HEPARIN SODIUM 1000 [USP'U]/ML
INJECTION, SOLUTION INTRAVENOUS; SUBCUTANEOUS AS NEEDED
Status: DISCONTINUED | OUTPATIENT
Start: 2024-04-11 | End: 2024-04-11 | Stop reason: HOSPADM

## 2024-04-11 RX ORDER — SODIUM CHLORIDE 9 MG/ML
1000 INJECTION, SOLUTION INTRAVENOUS ONCE AS NEEDED
Status: DISCONTINUED | OUTPATIENT
Start: 2024-04-11 | End: 2024-04-11 | Stop reason: HOSPADM

## 2024-04-11 RX ORDER — ACETAMINOPHEN 160 MG/5ML
650 SOLUTION ORAL EVERY 6 HOURS PRN
Status: DISCONTINUED | OUTPATIENT
Start: 2024-04-11 | End: 2024-04-11 | Stop reason: HOSPADM

## 2024-04-11 RX ORDER — SODIUM CHLORIDE 9 MG/ML
100 INJECTION, SOLUTION INTRAVENOUS CONTINUOUS
Status: DISCONTINUED | OUTPATIENT
Start: 2024-04-11 | End: 2024-04-11 | Stop reason: HOSPADM

## 2024-04-11 RX ORDER — MIDAZOLAM HYDROCHLORIDE 1 MG/ML
INJECTION, SOLUTION INTRAMUSCULAR; INTRAVENOUS AS NEEDED
Status: DISCONTINUED | OUTPATIENT
Start: 2024-04-11 | End: 2024-04-11 | Stop reason: HOSPADM

## 2024-04-11 RX ORDER — SODIUM CHLORIDE 9 MG/ML
1.5 INJECTION, SOLUTION INTRAVENOUS CONTINUOUS
Status: ACTIVE | OUTPATIENT
Start: 2024-04-11 | End: 2024-04-11

## 2024-04-11 RX ORDER — ACETAMINOPHEN 325 MG/1
650 TABLET ORAL EVERY 6 HOURS PRN
Status: DISCONTINUED | OUTPATIENT
Start: 2024-04-11 | End: 2024-04-11 | Stop reason: HOSPADM

## 2024-04-11 RX ORDER — ACETAMINOPHEN 650 MG/1
650 SUPPOSITORY RECTAL EVERY 6 HOURS PRN
Status: DISCONTINUED | OUTPATIENT
Start: 2024-04-11 | End: 2024-04-11 | Stop reason: HOSPADM

## 2024-04-11 ASSESSMENT — PAIN SCALES - GENERAL

## 2024-04-11 ASSESSMENT — COLUMBIA-SUICIDE SEVERITY RATING SCALE - C-SSRS
6. HAVE YOU EVER DONE ANYTHING, STARTED TO DO ANYTHING, OR PREPARED TO DO ANYTHING TO END YOUR LIFE?: NO
2. HAVE YOU ACTUALLY HAD ANY THOUGHTS OF KILLING YOURSELF?: NO
1. IN THE PAST MONTH, HAVE YOU WISHED YOU WERE DEAD OR WISHED YOU COULD GO TO SLEEP AND NOT WAKE UP?: NO

## 2024-04-11 ASSESSMENT — PAIN - FUNCTIONAL ASSESSMENT
PAIN_FUNCTIONAL_ASSESSMENT: 0-10

## 2024-04-11 NOTE — DISCHARGE INSTRUCTIONS
If you have any questions, please call the Cath Lab Nurse Practitioner Radhacait Patel at [phone] and leave a message. She will return your call the same day if calling before 3 PM, M-F. If you call on the weekend you can expect a call back on Monday morning. You may also call the Cath Lab at [phone] M-F, 7-3:30 with any questions. Weekends and after hours please call your cardiologist office number to reach a physician on call. The heart group office number is [phone]           CARDIAC CATHETERIZATION DISCHARGE INSTRUCTIONS     FOR SUDDEN AND SEVERE CHEST PAIN, SHORTNESS OF BREATH, EXCESSIVE BLEEDING, SIGNS OF STROKE, OR CHANGES IN MENTAL STATUS YOU SHOULD CALL 911 IMMEDIATELY.     If your provider has prescribed aspirin and/or clopidogrel (Plavix), or prasugrel (Effient), or ticagrelor (Brilinta), DO NOT STOP THESE MEDICATIONS for any reason without talking to your cardiologist first. If any of these were prescribed, you must take them every day without missing a single dose. If you are getting low on these medications, contact your provider immediately for a refill.     FOR NEXT 24 HOURS  - Upon discharge, you should return home and rest for the remainder of the day and evening. You do not have to stay on bed rest but should not be very active.  It is recommended a responsible adult be with you for the first 24 hours after the procedure.    - No driving for 24 hours after procedure. Please arrange for someone to drive you home from the hospital today.     - Do not drive, operate machinery, or use power tools for 24 hours after your procedure.     - Do not make any legal decisions for 24 hours after your procedure.     - Do not drink alcoholic beverages for 24 hours after your procedure.    WOUND CARE   *FOR FEMORAL (LEG) ACCESS*  ·      Avoid heavy lifting (over 10 pounds) for 7 days, squatting or excessive bending for 2 days, and strenuous exercise for 7 days.  ·      No submerged bathing, swimming, or  hot tubs for the next 7 days, or until fully healed.  ·      Avoid sexual activity for 3-4 days until any groin discomfort has ceased.     *FOR RADIAL (WRIST) ACCESS*  ·      No lifting more than 5 pounds or excessive use of the wrist for 24 hours - for example, treat your wrist as if it is sprained.  ·      Do not engage in vigorous activities (tennis, golf, bowling, weights) for at least 48 hours after the procedure.  ·      Do not submerge the wrist for 7 days after the procedure.  ·      You should expect mild tingling in your hand and tenderness at the puncture site for up to 3 days.    - The transparent dressing should be removed from the site 24 hours after the procedure.  Wash the site gently with soap and water. Rinse well and pat dry. Keep the area clean and dry. You may apply a Band-Aid to the site. Avoid lotions, ointments, or powders until fully healed.     - You may shower the day after your procedure.      - It is normal to notice a small bruise around the puncture site and/or a small grape sized or smaller lump. Any large bruising or large lump warrants a call to the office.     - If bleeding should occur, lay down and apply pressure to the affected area for 10 minutes.  If the bleeding stops notify your physician.  If there is a large amount of bleeding or spurting of blood CALL 911 immediately.  DO NOT drive yourself to the hospital.    - You may experience some tenderness, bruising or minimal inflammation.  If you have any concerns, you may contact the Cath Lab or if any of these symptoms become excessive, contact your cardiologist or go to the emergency room.     OTHER INSTRUCTIONS  - You may take acetaminophen (Tylenol) as directed for discomfort.  If pain is not relieved with acetaminophen (Tylenol), contact your doctor.    - If you notice or experience any of the following, you should notify your doctor or seek medical attention  Chest pain or discomfort  Change in mental status or weakness in  extremities.  Dizziness, light headedness, or feeling faint.  Change in the site where the procedure was performed, such as bleeding or an increased area of bruising or swelling.  Tingling, numbness, pain, or coolness in the leg/arm beyond the site where the procedure was performed.  Signs of infection (i.e. shaking chills, temperature > 100 degrees Fahrenheit, warmth, redness) in the leg/arm area where the procedure was performed.  Changes in urination   Bloody or black stools  Vomiting blood  Severe nose bleeds  Any excessive bleeding    - If you DO NOT have an appointment with your cardiologist within 2-4 weeks following your procedure, please contact their office.      Important ways to reduce your risk of coronary artery disease by healthy diet, maintaining a healthy weight, exercising regularly and stop using tobacco products.     Mediterranean Diet    About this topic  This is a heart healthy diet. It is based on widely used foods and cooking styles from many countries around the [place]. The main pattern for the diet is more plant foods and monounsaturated fats, or good fats, like olive oil. Protein in this diet comes from seafood, legumes, nuts, seeds, and poultry and eggs in lowered amounts. You will also eat more whole grains, vegetables, and fruits and moderate amounts of alcohol are also included. This diet has less red meats, dairy products, and processed foods.    What will the results be?  Your diet will have less saturated fat, cholesterol, calories, sodium, and added sugars. Your diet will be higher in fiber. This will help to keep your blood sugar steady. This diet lowers the chance of heart disease and other health problems.  What lifestyle changes are needed?  If you do not often eat this way, you will need to change your eating habits. Be sure to get regular exercise. It is believed to help the health benefits of this diet.  What changes to diet are needed?  You may need to limit the  "amount of red meat and processed foods in your diet. Ask your dietitian for help planning meals that are right for you.  What foods are good to eat?  Plenty of fish and other seafood  Fresh, frozen, or canned fruits and vegetables  Nuts and nut butters and dried beans, lentils, or peas  Foods high in fiber like whole grains and whole grain products  Olive oil (good fat), peanut or canola oil, margarine, or spreads that list vegetable oil as the first ingredient and do not contain trans fat or partially hydrogenated oil  Small amounts of poultry and eggs  What foods should be limited or avoided?  Red meats  Sweets, desserts, and processed foods  Butter, oils, and fats that contain trans fats or are hydrogenated or partially hydrogenated  Gravies and sauces  What problems could happen?  Your weight may rise because your diet will be higher in fat from olive oil and nuts.  You may have lower iron levels. Be sure to eat foods rich in iron. Also, eat foods rich in vitamin C. This will help your body take in iron.  You may have lower calcium levels because you are eating less dairy products. Ask your doctor if you need to take a calcium supplement.  Wine is often thought of as part of a Mediterranean diet. It is not needed and you may choose not to include it. Avoid wine if you are prone to alcohol abuse or are pregnant. Also, avoid it if you are at risk for breast cancer, have liver problems, or have other illnesses that make it important for you to not have alcohol.  When do I need to call the doctor?  If you have any concerns about your diet     Health risks of obesity    The Basics  Written by the doctors and editors at [facility]  What does it mean to have obesity? -- Doctors use a calculation called \"body mass index,\" or \"BMI,\" to decide whether a person is underweight, at a healthy weight, overweight, or has obesity.  Your BMI will tell you which category you are in based on your weight and height (figure 1):  ?If " "your BMI is between 25 and 29.9, you are overweight.  ?If your BMI is 30 or greater, you have obesity.  Obesity is a problem, because it increases the risks of many different health problems. It can also make it hard for you to move, breathe, and do other things that people who are at a healthy weight can do easily.  What are the health risks of obesity? -- Having obesity increases a person's risk of developing many health problems. Here are just a few examples:  ?Diabetes  ?High blood pressure  ?High cholesterol  ?Heart disease (including heart attacks)  ?Stroke  ?Sleep apnea (a disorder in which you stop breathing for short periods while asleep)  ?Asthma  ?Cancer  Does having obesity shorten a person's life? -- Yes. Studies show that people with obesity die younger than people who are a healthy weight. They also show that the risk of death goes up the heavier a person is. The degree of increased risk depends on how long the person has had obesity, and on what other medical problems they have.  People with \"central obesity\" might also be at risk of dying younger. Central obesity means carrying extra weight in the belly area, even if the BMI is normal.  Should I see an expert? -- Yes. If you are overweight or have obesity, you can talk to your doctor or nurse. They might have suggestions for healthy ways to lose weight. It can also help to work with a dietitian (food and nutrition expert). A dietitian can help you choose healthy foods and plan meals.  Are there medical treatments that can help me lose weight? -- Yes. There are medicines and surgery to help with weight loss. But those treatments are only for people who have not been able to lose weight through lifestyle changes such as diet and exercise. Also, weight loss treatments do not take the place of diet and exercise. People who have those treatments must also change how they eat and how active they are.  What can I do to prevent the problems caused by " obesity? -- The best thing that you can do is lose weight. But even if weight loss is not possible, you can improve your health and lower your risk if you:  ?Become more active - Many types of physical activity can help, including walking. You can start with a few minutes a day and add more as you get stronger and build up your endurance. Anything that gets your body moving is good for you.  ?Improve your diet - It is healthy to have regular meal times, eat smaller portions, and not skip meals. Limit sweets, and avoid processed foods. Try to eat more vegetables and fruits instead.  ?Quit smoking (if you smoke) - Some people start eating more after they stop smoking, so try to make healthy food choices. Even if it increases your appetite, quitting smoking is still one of the best things that you can do to improve your health.  ?Limit alcohol - For females, drink no more than 1 drink a day. For males, drink no more than 2 drinks a day.  What causes obesity? -- The thing that increases a person's risk the most is having an unhealthy lifestyle. Most people develop obesity because they eat too much, eat unhealthy foods, and move too little. That's especially true of people who watch too much TV. But there are also other things that seem to increase the risk of obesity that many people do not know about. Here are some things that might affect a person's chance of developing obesity:  ?Mother's habits during and after pregnancy - People who eat a lot of calories, have diabetes, or smoke during pregnancy have a higher chance of having babies who have obesity as adults. Also, babies who drink formula might be more likely than  babies to develop obesity later in life.  ?Habits and weight gain during childhood - Children or teens who are overweight or have obesity are more likely to become have obesity as an adult.  ?Sleeping too little - People who do not get enough sleep are more likely to develop obesity than  "people who sleep enough.  ?Taking certain medicines - Long-term use of certain medicines, such as some medicines to treat depression, can cause weight gain. If you are concerned that 1 of your medicines might be making you gain weight, talk to your doctor or nurse. They might be able to switch you to a different medicine instead.  ?Certain hormonal conditions - Some hormonal problems can increase the risk of developing obesity. For example, a condition called \"polycystic ovary syndrome\" can cause weight gain, along with other symptoms like irregular periods.  What if I want to get pregnant? -- If you are overweight or have obesity, it might be harder to get pregnant. For males, obesity can also cause sex problems, like having trouble getting or keeping an erection. This is more likely if you also have high blood pressure or diabetes.  What if my child has obesity? -- In children, obesity has many of the same risks as it does in adults. For example, it can increase the risk of diabetes, high blood pressure, asthma, and sleep apnea. It can also cause added problems related to childhood. For example, obesity can make children grow faster than normal and cause girls to go through puberty earlier than usual.  All topics are updated as new evidence becomes available and our peer review process is complete.  This topic retrieved from [employer] on: Jan 11, 2024.  Topic 02700 Version 17.0  Release: 31.6.4 - C32.10  © 2024 [employer] and/or its affiliates. All rights reserved.  figure 1: Your body mass index (BMI)        If you use tobacco products please review   Quitting smoking    The Basics  Written by the doctors and editors at [employer]  What are the benefits of quitting smoking? -- Quitting smoking can dramatically improve your health and help you live longer. It lowers your risk of heart disease, lung disease, kidney failure, cancer, infection, stomach problems, and diabetes.  Quitting smoking can also lower your " "chances of getting osteoporosis, a condition that makes your bones weak.  Quitting is not easy for most people, and it might take several tries to completely quit. But help and support are available. Quitting smoking will improve your health no matter how old you are, even if you have smoked for a long time.  What should I do if I want to quit smoking? -- It's a good idea to start by talking with your doctor or nurse. It is possible to quit on your own, without help. But getting help greatly increases your chances of quitting successfully.  When you are ready to quit, you will make a plan to:  ?Set a quit date.  ?Tell your family and friends that you plan to quit.  ?Plan ahead for the challenges you will face, such as cigarette cravings.  ?Remove cigarettes from your home, car, and work.  How can my doctor or nurse help? -- Your doctor or nurse can give you advice on the best way to quit. They can also give you medicines to:  ?Reduce your craving for cigarettes  ?Reduce your \"withdrawal\" symptoms (symptoms that happen when you stop smoking)  Your doctor or nurse can also help you find a counselor to talk to. For most people who are trying to quit smoking, it works best to use both medicines and counseling.  You can also get help from a free phone line (4-617-QUITNOW, or 1-992.585.4464) or go online to www.smokefree.gov.  What are the symptoms of withdrawal? -- When you stop smoking, you might have symptoms such as:  ?Trouble sleeping  ?Feeling irritable, anxious, or restless  ?Getting frustrated or angry  ?Having trouble thinking clearly  These symptoms can be hard to deal with, which is why it can be so hard to quit. But medicines can help.  Some people who stop smoking become temporarily depressed. Some people need treatment for depression, such as counseling or medicines or both. People with depression might:  ?No longer enjoy or care about doing the things they used to like to do  ?Feel sad, down, hopeless, " nervous, or cranky most of the day, almost every day  ?Lose or gain weight  ?Sleep too much or too little  ?Feel tired or like they have no energy  ?Feel guilty or like they are worth nothing  ?Forget things or feel confused  ?Move and speak more slowly than usual  ?Act restless or have trouble staying still  ?Think about death or suicide  If you think you might be depressed, tell your doctor or nurse right away. They can talk to you about your symptoms and recommend treatment if needed.  Get help right away if you are thinking of hurting or killing yourself! -- Sometimes, people with depression think of hurting or killing themselves. If you ever feel like you might hurt yourself, help is available:  ?In the , contact the [employer] Suicide & Crisis Lifeline:  To speak to someone, call or text [employer].  To talk to someone online, go to www.[employer]/chat.  ?Call your doctor or nurse and tell them that it is an emergency.  ?Call for an ambulance (in the [place] and [place], call 9-1-1).  ?Go to the emergency department at your local hospital.  If you think your partner might have depression, or if you are worried that they might hurt themselves, get them help right away.  How does counseling work? -- A counselor can help you figure out:  ?What triggers you to want to smoke, and how to handle these situations  ?How to resist cravings  ?What you can do differently if you have tried to quit before  You can meet with a counselor in 1-on-1 sessions or as part of a group. There are other ways to get counseling, too, such as over the phone, through text messaging, or online.  How do medicines help you stop smoking? -- Different medicines work in different ways:  ?Nicotine replacement therapy - Nicotine is the main drug in cigarettes, and the reason they are addictive. These medicines reduce your body's craving for nicotine. They also help with withdrawal symptoms.  There are different forms of nicotine replacement, including skin  "patches, lozenges, gum, nasal sprays, and inhalers. Most can be bought without a prescription. Also, health insurance might cover some or all of the cost.  It often helps to use 2 forms of nicotine replacement. For example, you might wear a patch all the time, plus use gum or lozenges when you get a craving to smoke.  ?Varenicline - Varenicline (brand names: Chantix, Champix) is a prescription medicine that reduces withdrawal symptoms and cigarette cravings. Varenicline can increase the effects of alcohol in some people. It's a good idea to limit drinking while you're taking it, at least until you know how it affects you.  Even if you are not yet ready to commit to a quit date, varenicline can help reduce cravings. This can make it easier to quit when you are ready.  ?Bupropion - Bupropion (sample brand names: Wellbutrin, Zyban) is a prescription medicine that reduces your desire to smoke. It is also available in a generic version, which is cheaper than the brand name medicines. Doctors do not usually prescribe bupropion for people with seizures or who have had seizures in the past.  It might also be helpful to combine nicotine replacement with bupropion or varenicline. In some cases, a person might even take both bupropion and varenicline. Your doctor or nurse can help you figure out the best combination for you.  What about e-cigarettes? -- Sometimes people wonder if using electronic cigarettes, or \"e-cigarettes,\" can help them quit smoking. Using e-cigarettes is also called \"vaping.\" Doctors do not recommend e-cigarettes in place of using of medicines and counseling. That's because e-cigarettes still contain nicotine as well as other substances that might be harmful. It's also not clear how they can affect a person's health in the long term.  What if I am pregnant and I smoke? -- If you are pregnant, it's really important for the health of your baby that you quit. Ask your doctor what options you have, and what " is safest for your baby.  What if I have already smoked for a long time? -- The longer you have smoked, the higher your chances are of having health problems. But it is never too late to quit smoking. It helps your health even if you are older or have smoked for many years. The best thing you can do to lower your chance of having a health problem caused by smoking is to quit.  Will I gain weight if I quit? -- You might gain a few pounds. This can be frustrating for some people. But it's important to remember that you are improving your health by quitting smoking. You can help prevent gaining a lot of weight by staying active and eating a healthy diet.  What if I am not able to quit? -- If you don't quit on your first try, or if you quit but then start smoking again, don't give up hope. Lots of people have to try more than once before they are able to completely quit.  It might help to try to understand why quitting did not work. There might be something you can do differently when you try again. It can help to figure out which situations make you want to smoke, so you can avoid them.  What else can I do to improve my chances of quitting? -- You can:  ?Get regular exercise. Any type of physical activity, even gentle forms of movement, is good for your health. Physical activity can also help reduce stress.  ?Stay away from people who smoke and places that make you want to smoke. If people close to you smoke, ask them to quit with you or avoid smoking around you.  ?Carry gum, hard candy, or something to put in your mouth. If you get a craving for a cigarette, try 1 of these instead.  ?Don't give up, even if you start smoking again. It takes most people a few tries before they succeed.  All topics are updated as new evidence becomes available and our peer review process is complete.

## 2024-04-11 NOTE — NURSING NOTE
Final groin access site assessment WNL, no oozing or hematoma, distal pulse 2+. Dressing clean, dry, and intact. Radial site had small tract ooze, manual pressure applied x10 minutes and bleeding resolved. TR band removed, dressing applied. Right radial site and neurovascular assessment WNL.     Homegoing instructions specific to procedure given, patient verbalized understanding.  Discharge criteria met: patient easily arousable, responding appropriately. Vital signs +/- 20% of preprocedure baseline. Significant complications absent. Ambulates without dizziness. Pulse ox > 92% on room air or baseline O2.     Patient discharged to home, accompanied by family. Discharged via wheelchair.

## 2024-04-11 NOTE — Clinical Note
Closure device placed in the right radial artery. Site closed by radial compression system. 12 ml of air in radial band

## 2024-04-19 ENCOUNTER — HOSPITAL ENCOUNTER (OUTPATIENT)
Facility: HOSPITAL | Age: 69
Setting detail: OUTPATIENT SURGERY
End: 2024-04-19
Attending: INTERNAL MEDICINE | Admitting: INTERNAL MEDICINE
Payer: MEDICARE

## 2024-04-19 DIAGNOSIS — I47.29 OTHER VENTRICULAR TACHYCARDIA (MULTI): ICD-10-CM

## 2024-05-07 ENCOUNTER — APPOINTMENT (OUTPATIENT)
Dept: CARDIOLOGY | Facility: CLINIC | Age: 69
End: 2024-05-07
Payer: MEDICARE

## 2024-06-13 ENCOUNTER — OFFICE VISIT (OUTPATIENT)
Dept: PRIMARY CARE | Facility: CLINIC | Age: 69
End: 2024-06-13
Payer: MEDICARE

## 2024-06-13 VITALS
WEIGHT: 141.8 LBS | OXYGEN SATURATION: 94 % | SYSTOLIC BLOOD PRESSURE: 138 MMHG | TEMPERATURE: 97 F | BODY MASS INDEX: 26.79 KG/M2 | DIASTOLIC BLOOD PRESSURE: 68 MMHG | HEART RATE: 86 BPM

## 2024-06-13 DIAGNOSIS — L03.114 CELLULITIS OF LEFT ARM: Primary | ICD-10-CM

## 2024-06-13 PROCEDURE — 99213 OFFICE O/P EST LOW 20 MIN: CPT | Performed by: INTERNAL MEDICINE

## 2024-06-13 PROCEDURE — 3078F DIAST BP <80 MM HG: CPT | Performed by: INTERNAL MEDICINE

## 2024-06-13 PROCEDURE — 1159F MED LIST DOCD IN RCRD: CPT | Performed by: INTERNAL MEDICINE

## 2024-06-13 PROCEDURE — 3075F SYST BP GE 130 - 139MM HG: CPT | Performed by: INTERNAL MEDICINE

## 2024-06-13 PROCEDURE — 1123F ACP DISCUSS/DSCN MKR DOCD: CPT | Performed by: INTERNAL MEDICINE

## 2024-06-13 RX ORDER — CLINDAMYCIN HYDROCHLORIDE 150 MG/1
150 CAPSULE ORAL 3 TIMES DAILY
Qty: 30 CAPSULE | Refills: 0 | Status: SHIPPED | OUTPATIENT
Start: 2024-06-13 | End: 2024-06-23

## 2024-06-13 ASSESSMENT — ENCOUNTER SYMPTOMS
ENDOCRINE NEGATIVE: 1
RESPIRATORY NEGATIVE: 1
MUSCULOSKELETAL NEGATIVE: 1
CARDIOVASCULAR NEGATIVE: 1
EYES NEGATIVE: 1
CONSTITUTIONAL NEGATIVE: 1
GASTROINTESTINAL NEGATIVE: 1
ALLERGIC/IMMUNOLOGIC NEGATIVE: 1
NEUROLOGICAL NEGATIVE: 1
HEMATOLOGIC/LYMPHATIC NEGATIVE: 1
PSYCHIATRIC NEGATIVE: 1
WOUND: 1

## 2024-06-13 NOTE — PROGRESS NOTES
Subjective   Patient ID: Shani Watson is a 68 y.o. female who presents for left arm wound not healing .  Patient presents today with a complaint of a wound on her left arm that is not healing for her.    She states that she scraped her left forearm about 6 weeks ago with a V-shaped laceration.  Over time the bottom of the V has healed and the scar is visible on exam.  The top portion, however, is not healed and she has developed a nodule at this part as well that is painful.  Exam confirms a small nodular macule which is tender and does not express.  There is a slight amount of surrounding erythema as well.        Review of Systems   Constitutional: Negative.    HENT: Negative.     Eyes: Negative.    Respiratory: Negative.     Cardiovascular: Negative.    Gastrointestinal: Negative.    Endocrine: Negative.    Genitourinary: Negative.    Musculoskeletal: Negative.    Skin:  Positive for wound.   Allergic/Immunologic: Negative.    Neurological: Negative.    Hematological: Negative.    Psychiatric/Behavioral: Negative.         Objective     Blood pressure 138/68, pulse 86, temperature 36.1 °C (97 °F), temperature source Temporal, weight 64.3 kg (141 lb 12.8 oz), SpO2 94%.   Physical Exam  Constitutional:       Appearance: Normal appearance.   Pulmonary:      Effort: Pulmonary effort is normal.   Musculoskeletal:      Cervical back: Normal range of motion and neck supple.   Skin:     Comments: See description of wound in HPI   Neurological:      General: No focal deficit present.      Mental Status: She is alert and oriented to person, place, and time.   Psychiatric:         Mood and Affect: Mood normal.         Thought Content: Thought content normal.         Assessment/Plan   Problem List Items Addressed This Visit    None  Visit Diagnoses       Cellulitis of left arm    -  Primary    Relevant Medications    clindamycin (Cleocin) 150 mg capsule          I believe there is an aspect of cellulitis inhibiting the  healing.  Med allergies are reviewed.  Will treat with clindamycin.  She states she is established with Derm and I recommend that she see Derm to confirm my suspicion and/or definitively Dx and treat.    Follow up as scheduled

## 2024-06-14 ENCOUNTER — HOSPITAL ENCOUNTER (OUTPATIENT)
Facility: HOSPITAL | Age: 69
Discharge: HOME | End: 2024-06-15
Attending: INTERNAL MEDICINE | Admitting: INTERNAL MEDICINE
Payer: MEDICARE

## 2024-06-14 ENCOUNTER — APPOINTMENT (OUTPATIENT)
Dept: CARDIOLOGY | Facility: HOSPITAL | Age: 69
End: 2024-06-14
Payer: MEDICARE

## 2024-06-14 ENCOUNTER — ANESTHESIA EVENT (OUTPATIENT)
Dept: CARDIOLOGY | Facility: HOSPITAL | Age: 69
End: 2024-06-14
Payer: MEDICARE

## 2024-06-14 ENCOUNTER — ANESTHESIA (OUTPATIENT)
Dept: CARDIOLOGY | Facility: HOSPITAL | Age: 69
End: 2024-06-14
Payer: MEDICARE

## 2024-06-14 ENCOUNTER — HOSPITAL ENCOUNTER (OUTPATIENT)
Dept: CARDIOLOGY | Facility: HOSPITAL | Age: 69
Discharge: HOME | End: 2024-06-14
Payer: MEDICARE

## 2024-06-14 DIAGNOSIS — I47.29 PAROXYSMAL VENTRICULAR TACHYCARDIA (MULTI): Primary | ICD-10-CM

## 2024-06-14 DIAGNOSIS — I47.29 OTHER VENTRICULAR TACHYCARDIA (MULTI): ICD-10-CM

## 2024-06-14 DIAGNOSIS — Z95.810 CARDIAC RESYNCHRONIZATION THERAPY DEFIBRILLATOR (CRT-D) IN PLACE: ICD-10-CM

## 2024-06-14 PROBLEM — I47.20 VENTRICULAR TACHYCARDIA (MULTI): Status: ACTIVE | Noted: 2024-06-14

## 2024-06-14 PROCEDURE — 93654 COMPRE EP EVAL TX VT: CPT | Performed by: INTERNAL MEDICINE

## 2024-06-14 PROCEDURE — 93662 INTRACARDIAC ECG (ICE): CPT | Performed by: INTERNAL MEDICINE

## 2024-06-14 PROCEDURE — C1730 CATH, EP, 19 OR FEW ELECT: HCPCS | Performed by: INTERNAL MEDICINE

## 2024-06-14 PROCEDURE — 3700000002 HC GENERAL ANESTHESIA TIME - EACH INCREMENTAL 1 MINUTE: Performed by: INTERNAL MEDICINE

## 2024-06-14 PROCEDURE — 3700000001 HC GENERAL ANESTHESIA TIME - INITIAL BASE CHARGE: Performed by: INTERNAL MEDICINE

## 2024-06-14 PROCEDURE — 2500000001 HC RX 250 WO HCPCS SELF ADMINISTERED DRUGS (ALT 637 FOR MEDICARE OP): Performed by: STUDENT IN AN ORGANIZED HEALTH CARE EDUCATION/TRAINING PROGRAM

## 2024-06-14 PROCEDURE — 2500000002 HC RX 250 W HCPCS SELF ADMINISTERED DRUGS (ALT 637 FOR MEDICARE OP, ALT 636 FOR OP/ED): Performed by: STUDENT IN AN ORGANIZED HEALTH CARE EDUCATION/TRAINING PROGRAM

## 2024-06-14 PROCEDURE — 93287 PERI-PX DEVICE EVAL & PRGR: CPT

## 2024-06-14 PROCEDURE — C1894 INTRO/SHEATH, NON-LASER: HCPCS | Performed by: INTERNAL MEDICINE

## 2024-06-14 PROCEDURE — 93010 ELECTROCARDIOGRAM REPORT: CPT | Performed by: INTERNAL MEDICINE

## 2024-06-14 PROCEDURE — G0378 HOSPITAL OBSERVATION PER HR: HCPCS

## 2024-06-14 PROCEDURE — G0269 OCCLUSIVE DEVICE IN VEIN ART: HCPCS | Mod: TC | Performed by: INTERNAL MEDICINE

## 2024-06-14 PROCEDURE — 2500000004 HC RX 250 GENERAL PHARMACY W/ HCPCS (ALT 636 FOR OP/ED): Performed by: NURSE ANESTHETIST, CERTIFIED REGISTERED

## 2024-06-14 PROCEDURE — 85347 COAGULATION TIME ACTIVATED: CPT | Performed by: INTERNAL MEDICINE

## 2024-06-14 PROCEDURE — C1766 INTRO/SHEATH,STRBLE,NON-PEEL: HCPCS | Performed by: INTERNAL MEDICINE

## 2024-06-14 PROCEDURE — 76937 US GUIDE VASCULAR ACCESS: CPT | Performed by: INTERNAL MEDICINE

## 2024-06-14 PROCEDURE — 2500000004 HC RX 250 GENERAL PHARMACY W/ HCPCS (ALT 636 FOR OP/ED)

## 2024-06-14 PROCEDURE — 2500000005 HC RX 250 GENERAL PHARMACY W/O HCPCS: Performed by: NURSE ANESTHETIST, CERTIFIED REGISTERED

## 2024-06-14 PROCEDURE — 85347 COAGULATION TIME ACTIVATED: CPT | Mod: MUE

## 2024-06-14 PROCEDURE — C1893 INTRO/SHEATH, FIXED,NON-PEEL: HCPCS | Performed by: INTERNAL MEDICINE

## 2024-06-14 PROCEDURE — 2500000005 HC RX 250 GENERAL PHARMACY W/O HCPCS

## 2024-06-14 PROCEDURE — 93005 ELECTROCARDIOGRAM TRACING: CPT

## 2024-06-14 PROCEDURE — 93287 PERI-PX DEVICE EVAL & PRGR: CPT | Performed by: INTERNAL MEDICINE

## 2024-06-14 PROCEDURE — C1759 CATH, INTRA ECHOCARDIOGRAPHY: HCPCS | Performed by: INTERNAL MEDICINE

## 2024-06-14 PROCEDURE — 2500000004 HC RX 250 GENERAL PHARMACY W/ HCPCS (ALT 636 FOR OP/ED): Performed by: INTERNAL MEDICINE

## 2024-06-14 PROCEDURE — 2720000007 HC OR 272 NO HCPCS: Performed by: INTERNAL MEDICINE

## 2024-06-14 PROCEDURE — C1732 CATH, EP, DIAG/ABL, 3D/VECT: HCPCS | Performed by: INTERNAL MEDICINE

## 2024-06-14 RX ORDER — PROPOFOL 10 MG/ML
INJECTION, EMULSION INTRAVENOUS AS NEEDED
Status: DISCONTINUED | OUTPATIENT
Start: 2024-06-14 | End: 2024-06-14

## 2024-06-14 RX ORDER — LIDOCAINE HYDROCHLORIDE 20 MG/ML
INJECTION, SOLUTION INFILTRATION; PERINEURAL AS NEEDED
Status: DISCONTINUED | OUTPATIENT
Start: 2024-06-14 | End: 2024-06-14

## 2024-06-14 RX ORDER — SERTRALINE HYDROCHLORIDE 100 MG/1
100 TABLET, FILM COATED ORAL DAILY
Status: DISCONTINUED | OUTPATIENT
Start: 2024-06-14 | End: 2024-06-14

## 2024-06-14 RX ORDER — DEXAMETHASONE SODIUM PHOSPHATE 100 MG/10ML
INJECTION INTRAMUSCULAR; INTRAVENOUS AS NEEDED
Status: DISCONTINUED | OUTPATIENT
Start: 2024-06-14 | End: 2024-06-14

## 2024-06-14 RX ORDER — NOREPINEPHRINE BITARTRATE 0.03 MG/ML
INJECTION, SOLUTION INTRAVENOUS CONTINUOUS PRN
Status: DISCONTINUED | OUTPATIENT
Start: 2024-06-14 | End: 2024-06-14

## 2024-06-14 RX ORDER — HEPARIN SODIUM 1000 [USP'U]/ML
INJECTION, SOLUTION INTRAVENOUS; SUBCUTANEOUS AS NEEDED
Status: DISCONTINUED | OUTPATIENT
Start: 2024-06-14 | End: 2024-06-14 | Stop reason: HOSPADM

## 2024-06-14 RX ORDER — PROTAMINE SULFATE 10 MG/ML
INJECTION, SOLUTION INTRAVENOUS AS NEEDED
Status: DISCONTINUED | OUTPATIENT
Start: 2024-06-14 | End: 2024-06-14

## 2024-06-14 RX ORDER — AMIODARONE HYDROCHLORIDE 200 MG/1
100 TABLET ORAL DAILY
Status: DISCONTINUED | OUTPATIENT
Start: 2024-06-14 | End: 2024-06-15 | Stop reason: HOSPADM

## 2024-06-14 RX ORDER — CALCIUM CARBONATE 200(500)MG
500 TABLET,CHEWABLE ORAL DAILY
Status: DISCONTINUED | OUTPATIENT
Start: 2024-06-14 | End: 2024-06-15 | Stop reason: HOSPADM

## 2024-06-14 RX ORDER — ROCURONIUM BROMIDE 10 MG/ML
INJECTION, SOLUTION INTRAVENOUS AS NEEDED
Status: DISCONTINUED | OUTPATIENT
Start: 2024-06-14 | End: 2024-06-14

## 2024-06-14 RX ORDER — CEFAZOLIN 1 G/1
INJECTION, POWDER, FOR SOLUTION INTRAVENOUS AS NEEDED
Status: DISCONTINUED | OUTPATIENT
Start: 2024-06-14 | End: 2024-06-14

## 2024-06-14 RX ORDER — FENTANYL CITRATE 50 UG/ML
INJECTION, SOLUTION INTRAMUSCULAR; INTRAVENOUS AS NEEDED
Status: DISCONTINUED | OUTPATIENT
Start: 2024-06-14 | End: 2024-06-14

## 2024-06-14 RX ORDER — MULTIVIT-MIN/IRON FUM/FOLIC AC 7.5 MG-4
1 TABLET ORAL DAILY
Status: DISCONTINUED | OUTPATIENT
Start: 2024-06-14 | End: 2024-06-15 | Stop reason: HOSPADM

## 2024-06-14 RX ORDER — TIZANIDINE 4 MG/1
4 TABLET ORAL EVERY 8 HOURS PRN
Status: DISCONTINUED | OUTPATIENT
Start: 2024-06-14 | End: 2024-06-15 | Stop reason: HOSPADM

## 2024-06-14 RX ORDER — SERTRALINE HYDROCHLORIDE 50 MG/1
150 TABLET, FILM COATED ORAL NIGHTLY
Status: DISCONTINUED | OUTPATIENT
Start: 2024-06-14 | End: 2024-06-15 | Stop reason: HOSPADM

## 2024-06-14 RX ORDER — PRASUGREL 10 MG/1
10 TABLET, FILM COATED ORAL DAILY
Status: DISCONTINUED | OUTPATIENT
Start: 2024-06-14 | End: 2024-06-15 | Stop reason: HOSPADM

## 2024-06-14 RX ORDER — ONDANSETRON HYDROCHLORIDE 2 MG/ML
INJECTION, SOLUTION INTRAVENOUS AS NEEDED
Status: DISCONTINUED | OUTPATIENT
Start: 2024-06-14 | End: 2024-06-14

## 2024-06-14 RX ORDER — NORETHINDRONE AND ETHINYL ESTRADIOL 0.5-0.035
KIT ORAL AS NEEDED
Status: DISCONTINUED | OUTPATIENT
Start: 2024-06-14 | End: 2024-06-14

## 2024-06-14 RX ORDER — EZETIMIBE 10 MG/1
10 TABLET ORAL DAILY
Status: DISCONTINUED | OUTPATIENT
Start: 2024-06-14 | End: 2024-06-15 | Stop reason: HOSPADM

## 2024-06-14 RX ORDER — SPIRONOLACTONE 25 MG/1
12.5 TABLET ORAL DAILY
Status: DISCONTINUED | OUTPATIENT
Start: 2024-06-14 | End: 2024-06-15 | Stop reason: HOSPADM

## 2024-06-14 RX ORDER — METOPROLOL SUCCINATE 50 MG/1
50 TABLET, EXTENDED RELEASE ORAL DAILY
Status: DISCONTINUED | OUTPATIENT
Start: 2024-06-14 | End: 2024-06-15 | Stop reason: HOSPADM

## 2024-06-14 RX ORDER — HEPARIN SODIUM 10000 [USP'U]/100ML
INJECTION, SOLUTION INTRAVENOUS CONTINUOUS PRN
Status: DISCONTINUED | OUTPATIENT
Start: 2024-06-14 | End: 2024-06-14 | Stop reason: HOSPADM

## 2024-06-14 RX ADMIN — ONDANSETRON 4 MG: 2 INJECTION, SOLUTION INTRAMUSCULAR; INTRAVENOUS at 17:14

## 2024-06-14 RX ADMIN — EPHEDRINE SULFATE 5 MG: 50 INJECTION, SOLUTION INTRAVENOUS at 14:14

## 2024-06-14 RX ADMIN — SUGAMMADEX 200 MG: 100 INJECTION, SOLUTION INTRAVENOUS at 17:17

## 2024-06-14 RX ADMIN — AMIODARONE HYDROCHLORIDE 100 MG: 200 TABLET ORAL at 21:44

## 2024-06-14 RX ADMIN — SODIUM CHLORIDE, SODIUM LACTATE, POTASSIUM CHLORIDE, AND CALCIUM CHLORIDE: 600; 310; 30; 20 INJECTION, SOLUTION INTRAVENOUS at 13:00

## 2024-06-14 RX ADMIN — CALCIUM CARBONATE (ANTACID) CHEW TAB 500 MG 500 MG: 500 CHEW TAB at 23:15

## 2024-06-14 RX ADMIN — ROCURONIUM 50 MG: 50 INJECTION, SOLUTION INTRAVENOUS at 13:23

## 2024-06-14 RX ADMIN — LIDOCAINE HYDROCHLORIDE 30 MG: 20 INJECTION, SOLUTION INFILTRATION; PERINEURAL at 13:21

## 2024-06-14 RX ADMIN — CEFAZOLIN 2 G: 1 INJECTION, POWDER, FOR SOLUTION INTRAMUSCULAR; INTRAVENOUS at 13:32

## 2024-06-14 RX ADMIN — FENTANYL CITRATE 50 MCG: 50 INJECTION, SOLUTION INTRAMUSCULAR; INTRAVENOUS at 13:20

## 2024-06-14 RX ADMIN — EPHEDRINE SULFATE 5 MG: 50 INJECTION, SOLUTION INTRAVENOUS at 14:28

## 2024-06-14 RX ADMIN — ROCURONIUM 10 MG: 50 INJECTION, SOLUTION INTRAVENOUS at 14:24

## 2024-06-14 RX ADMIN — PROPOFOL 150 MG: 10 INJECTION, EMULSION INTRAVENOUS at 13:21

## 2024-06-14 RX ADMIN — ROCURONIUM 10 MG: 50 INJECTION, SOLUTION INTRAVENOUS at 16:03

## 2024-06-14 RX ADMIN — METOPROLOL SUCCINATE 50 MG: 50 TABLET, EXTENDED RELEASE ORAL at 21:44

## 2024-06-14 RX ADMIN — PROTAMINE SULFATE 30 MG: 10 INJECTION, SOLUTION INTRAVENOUS at 17:10

## 2024-06-14 RX ADMIN — EZETIMIBE 10 MG: 10 TABLET ORAL at 21:47

## 2024-06-14 RX ADMIN — EPHEDRINE SULFATE 10 MG: 50 INJECTION, SOLUTION INTRAVENOUS at 14:31

## 2024-06-14 RX ADMIN — SERTRALINE HYDROCHLORIDE 150 MG: 50 TABLET ORAL at 21:43

## 2024-06-14 RX ADMIN — SPIRONOLACTONE 12.5 MG: 25 TABLET ORAL at 21:44

## 2024-06-14 RX ADMIN — DEXAMETHASONE SODIUM PHOSPHATE 8 MG: 10 INJECTION INTRAMUSCULAR; INTRAVENOUS at 13:29

## 2024-06-14 RX ADMIN — PRASUGREL 10 MG: 10 TABLET, FILM COATED ORAL at 21:44

## 2024-06-14 SDOH — HEALTH STABILITY: MENTAL HEALTH: CURRENT SMOKER: 0

## 2024-06-14 ASSESSMENT — ENCOUNTER SYMPTOMS
LOSS OF SENSATION IN FEET: 0
DEPRESSION: 0
PALPITATIONS: 1
OCCASIONAL FEELINGS OF UNSTEADINESS: 0

## 2024-06-14 ASSESSMENT — COGNITIVE AND FUNCTIONAL STATUS - GENERAL
CLIMB 3 TO 5 STEPS WITH RAILING: A LITTLE
DAILY ACTIVITIY SCORE: 24
MOBILITY SCORE: 23

## 2024-06-14 ASSESSMENT — PAIN SCALES - GENERAL: PAINLEVEL_OUTOF10: 0 - NO PAIN

## 2024-06-14 ASSESSMENT — PATIENT HEALTH QUESTIONNAIRE - PHQ9
1. LITTLE INTEREST OR PLEASURE IN DOING THINGS: NOT AT ALL
SUM OF ALL RESPONSES TO PHQ9 QUESTIONS 1 AND 2: 0
2. FEELING DOWN, DEPRESSED OR HOPELESS: NOT AT ALL

## 2024-06-14 NOTE — H&P
History Of Present Illness  Shani Watson is a 68 y.o. female presenting with VT.     Past Medical History  Past Medical History:   Diagnosis Date    Combined forms of age-related cataract, bilateral 04/03/2019    Combined form of age-related cataract, both eyes    Depression, unspecified 11/16/2016    Depression    Dry eyes     Essential (primary) hypertension 11/16/2016    Benign essential hypertension    Heart disease, unspecified     Heart problem    Other vitreous opacities, right eye 04/03/2019    Vitreous floaters of right eye    Personal history of other diseases of the circulatory system 11/16/2016    History of hypertension    Personal history of other diseases of the musculoskeletal system and connective tissue     History of degenerative disc disease    Personal history of other diseases of the nervous system and sense organs     History of migraine    Personal history of other diseases of the nervous system and sense organs     History of cataract    Personal history of other mental and behavioral disorders 05/22/2020    History of anxiety    PVD (posterior vitreous detachment), right eye        Surgical History  Past Surgical History:   Procedure Laterality Date    CARDIAC CATHETERIZATION N/A 4/11/2024    Procedure: Left Heart Cath, No LV;  Surgeon: Liban Campa MD;  Location: [facility] Cardiac Cath Lab;  Service: Cardiovascular;  Laterality: N/A;    CARDIAC PACEMAKER PLACEMENT  05/01/2018    Pacemaker Permanent Placement    CARDIAC SURGERY      CATARACT EXTRACTION  11/16/2016    Cataract Surgery    DILATION AND CURETTAGE OF UTERUS  11/16/2016    Dilation And Curettage    OTHER SURGICAL HISTORY  06/11/2019    Radiofrequency ablation    OTHER SURGICAL HISTORY  04/03/2019    Cataract surgery    TONSILLECTOMY  11/16/2016    Tonsillectomy        Social History  She reports that she has never smoked. She has never used smokeless tobacco. She reports current alcohol use. She reports that she does not use  drugs.    Family History  Family History   Problem Relation Name Age of Onset    Heart disease Mother      Hypertension Mother      Coronary artery disease Mother      Osteoporosis Mother      Heart disease Father      Melanoma Father      Gout Father      Prostate cancer Father      Hypertension Brother      Amblyopia Brother      Cancer Father's Sister      Hypertension Maternal Grandfather      Heart attack Maternal Grandfather      Coronary artery disease Maternal Grandfather      Liver cancer Paternal Grandmother      Breast cancer Cousin          Allergies  Bactrim [sulfamethoxazole-trimethoprim], Erythromycin, Amoxicillin, Milk, Statins-hmg-coa reductase inhibitors, Valacyclovir, Codeine, Diazepam, Latex, Meperidine, Penicillins, and Sulfa (sulfonamide antibiotics)    Review of Systems   Cardiovascular:  Positive for palpitations.   All other systems reviewed and are negative.       Physical Exam  Constitutional:       General: She is not in acute distress.     Appearance: Normal appearance. She is not toxic-appearing.   HENT:      Head: Normocephalic and atraumatic.      Mouth/Throat:      Mouth: Mucous membranes are moist.      Pharynx: Oropharynx is clear.   Eyes:      Extraocular Movements: Extraocular movements intact.      Conjunctiva/sclera: Conjunctivae normal.      Pupils: Pupils are equal, round, and reactive to light.   Cardiovascular:      Rate and Rhythm: Normal rate and regular rhythm.      Heart sounds: No murmur heard.     No friction rub. No gallop.   Pulmonary:      Effort: Pulmonary effort is normal. No respiratory distress.      Breath sounds: Normal breath sounds. No wheezing or rales.   Abdominal:      General: Abdomen is flat. Bowel sounds are normal. There is no distension.      Tenderness: There is no abdominal tenderness. There is no guarding.   Musculoskeletal:         General: No swelling. Normal range of motion.      Cervical back: Normal range of motion.   Skin:     General: Skin  is warm and dry.      Capillary Refill: Capillary refill takes less than 2 seconds.   Neurological:      General: No focal deficit present.      Mental Status: She is alert and oriented to person, place, and time. Mental status is at baseline.   Psychiatric:         Mood and Affect: Mood normal.         Behavior: Behavior normal.         Thought Content: Thought content normal.         Judgment: Judgment normal.       Malampati iii  Asa iii      Last Recorded Vitals  There were no vitals taken for this visit.    Relevant Results        Results reviewed      Assessment/Plan   Active Problems:  There are no active Hospital Problems.      Endocardial vt ablation         I spent 30 minutes in the professional and overall care of this patient.      Fiasal Chavez MD

## 2024-06-14 NOTE — ANESTHESIA PROCEDURE NOTES
Peripheral IV  Date/Time: 6/14/2024 1:26 PM      Placement  Needle size: 18 G  Laterality: left  Location: hand  Site prep: alcohol  Technique: anatomical landmarks  Attempts: 1

## 2024-06-14 NOTE — PROGRESS NOTES
Pharmacy Medication History Review    Shani Watson is a 68 y.o. female admitted for No Principal Problem: There is no principal problem currently on the Problem List. Please update the Problem List and refresh.. Pharmacy reviewed the patient's dolcq-uf-hjkdbomav medication for accuracy.    The list below reflects the updated PTA list. Comments regarding how patient may be taking medications differently can be found in the Admit Orders Activity  Prior to Admission Medications   Prescriptions Last Dose Informant   acetaminophen (Tylenol) 500 mg tablet Unknown Self   Sig: Take 1 tablet (500 mg) by mouth every 6 hours if needed.   amiodarone (Pacerone) 100 mg tablet 6/13/2024 Self   Sig: Take 1 tablet (100 mg) by mouth once daily.   clindamycin (Cleocin) 150 mg capsule Unknown Self   Sig: Take 1 capsule (150 mg) by mouth 3 times a day for 10 days.   ezetimibe (Zetia) 10 mg tablet 6/13/2024 Self   Sig: Take 1 tablet (10 mg) by mouth once daily.   fluticasone (Flonase) 50 mcg/actuation nasal spray Unknown Self   Sig: Administer 1 spray into affected nostril(s) 2 times a day.   gabapentin (Neurontin) 300 mg capsule 6/13/2024 Self   Sig: Take 1-2 capsules (300-600 mg) by mouth once daily as needed (heel pain).   metoprolol succinate XL (Toprol-XL) 50 mg 24 hr tablet 6/13/2024 Self   Sig: Take 1 tablet (50 mg) by mouth once daily.   multivitamin-min-iron-FA-vit K (Adults Multivitamin) 18 mg iron-400 mcg-25 mcg tablet 6/13/2024 Self   Sig: Take 1 tablet by mouth once daily.   prasugrel (Effient) 10 mg tablet 6/13/2024 Self   Sig: Take 1 tablet (10 mg) by mouth once daily.   sertraline (Zoloft) 100 mg tablet 6/13/2024 Self   Sig: Take 1 tablet (100 mg) by mouth once daily.   sertraline (Zoloft) 50 mg tablet 6/13/2024 Self   Sig: Take 1 tablet (50 mg) by mouth once daily.   spironolactone (Aldactone) 25 mg tablet 6/13/2024 Self   Sig: Take 0.5 tablets (12.5 mg) by mouth once daily.   tiZANidine (Zanaflex) 4 mg tablet Unknown  Self   Sig: Take 1 tablet (4 mg) by mouth every 8 hours if needed for muscle spasms.      Facility-Administered Medications: None        Patient declines M2B at discharge. Pharmacy has been updated to [facility].    Sources:    -patient interview  -outpatient pharmacy dispense history  -Care Everywhere medication lists  -OARRS  -cardiology OV 4/11    Below are additional concerns with the patient's PTA list: none    Bandar Burch PharmD  Transitions of Care Pharmacist  [facility]  Please reach out via Secure Chat for questions, or if no response call Verious or [facility]

## 2024-06-14 NOTE — ANESTHESIA PROCEDURE NOTES
Arterial Line:    Date/Time: 6/14/2024 1:30 PM    Staffing  Performed: CRNA   Authorized by: Mg Ivey MD    Performed by: ERNESTO Bowles-CRNA    An arterial line was placed. Procedure performed using surface landmarks.in the OR for the following indication(s): continuous blood pressure monitoring and blood sampling needed.    A 20 gauge (size), 5 cm (length), Angiocath (type) catheter was placed into the Right radial artery, secured by Tegaderm,   Seldinger technique not used.  Events:  patient tolerated procedure well with no complications.

## 2024-06-14 NOTE — Clinical Note
Attending Statement:. I saw and evaluated the patient. I discussed the patient's case with the Resident.     OG - , 32w4d    Hgb 11.6  Now on daily lovenox, continue until 6wks now  Tdap 22  HIV/RPR nonreactive  Postpartum contraception condoms  Breast Pump/Supplies info given  OB pre registration done - on portal   NO GBBS screen @35wks + urine Cx 22    Melanie Bauman MD         Sheath was inserted in the right femoral vein. Ultrasound guidance was used.

## 2024-06-14 NOTE — Clinical Note
Report was give by trista to [place] [address] RN invia phone.  Report given to holding room RN until patient stable to transfer to [place] [id number]

## 2024-06-14 NOTE — DISCHARGE INSTRUCTIONS
INSTRUCTIONS AFTER ABLATION PROCEDURE:    * In the first week after ablation you should take it easy. No heavy lifting or heavy exertion, no treadmill.  You can use the stairs if needed but go slowly and minimize the number of times up and down.    * Some minor bruising is common at each groin access site with minor soreness as if you had banged the area. Bruising may occasionally be seen to extend down the leg. This is normal as is an occasional small quarter sized bump in the area. If larger swelling or more significant pain occurs at the area, please contact the office or go the nearest Emergency Room.    * All medications will generally remain the same unless you are told otherwise.  Resume taking your home medications today as listed on the discharge instructions.    *Continue to follow up with your primary cardiologist, primary care physician, and any other specialists you normally see.    *No driving for 2 days post procedure (IF you were driving prior to procedure).    *Diet: Heart healthy    Call Provider If:  Breathing faster than normal.     Fever of 100.4 F (38 C) or higher.     Chills.     Any new concerning symptoms.     Passing out.     Patient Instructions, Next 24 hours:  DO NOT drive a car, operate machinery or power tools.  It is recommended that a responsible adult be with you for the first 24 hours.     DO NOT drink any alcoholic drinks or take any non-prescriptive medications that contain alcohol for the first 24 hours.     DO NOT make any important decisions for the first 24 hours.    Activity:  You are advised to go directly home from the hospital.     DO NOT lift anything heavier than 10 pounds for one week, this allows for proper healing of the groin.     No excessive exercise or treadmill use for one week. You may walk and do stairs, slowly.     No sexual activities for 24 hours after you arrive home.    Wound Care:  If slight bleeding should occur at site, lie down and have someone apply  firm pressure just above the puncture site for 5 minutes.  If it continues or is profuse, call 911. Always notify your doctor if bleeding occurs.     Keep site clean and dry. Let air dry or you may use a simple bandaid.     Gently cleanse the puncture site in your groin with soap and water only.     You may experience some tenderness, bruising or minimal inflammation.  If you have any concerns, you may contact the Cath Lab or if any of these symptoms become excessive, contact your cardiologist or go to the emergency room.     No tub baths, soaking, or swimming for one week.     May shower the next day after your procedure.    If you have any questions about the effects of the sedative drugs or groin care, call the physician who performed your procedure.    FOLLOW UP:   1) Dr Manuel Macias ( Electrophysiology) 6 weeks after ablation as scheduled

## 2024-06-14 NOTE — ANESTHESIA PREPROCEDURE EVALUATION
Patient: Shain Watson    Procedure Information       Date/Time: 06/14/24 1230    Procedure: Ablation VT    Location: [facility] BIPLANE / Virtual [facility] MAT 3529 Cardiac Cath Lab    Providers: Manuel Macias MD            Relevant Problems   Cardiac   (+) Atherosclerosis of coronary artery bypass graft   (+) Benign essential hypertension   (+) CAD in native artery   (+) Hyperlipidemia   (+) Mitral valve insufficiency   (+) Paroxysmal ventricular tachycardia (Multi)   (+) Presence of cardiac pacemaker   (+) Ventricular tachycardia (paroxysmal) (Multi)      Pulmonary   (+) Dyspnea on exertion   (+) RODRI on CPAP      Neuro   (+) Anxiety   (+) Depression, recurrent (CMS-HCC)   (+) Mixed anxiety depressive disorder      GI   (+) Gastroesophageal reflux disease      /Renal   (+) Urinary tract infection      Hematology   (+) Anemia      Musculoskeletal   (+) Chronic bilateral low back pain with bilateral sciatica   (+) Localized primary osteoarthritis of carpometacarpal (CMC) joint of right wrist      HEENT   (+) Asymmetrical sensorineural hearing loss      ID   (+) Urinary tract infection       Clinical information reviewed:    Allergies  Meds               NPO Detail:  NPO/Void Status  Date of Last Liquid: 06/13/24  Date of Last Solid: 06/13/24         Physical Exam    Airway  Mallampati: II  TM distance: >3 FB  Neck ROM: full     Cardiovascular    Dental - normal exam     Pulmonary    Abdominal            Anesthesia Plan    History of general anesthesia?: yes  History of complications of general anesthesia?: no    ASA 3     general     The patient is not a current smoker.  Patient did not smoke on day of procedure.    intravenous induction   Postoperative administration of opioids is intended.  Trial extubation is planned.  Anesthetic plan and risks discussed with patient.  Use of blood products discussed with patient who consented to blood products.    Plan discussed with CRNA.    Plan MAC/sedation with local per  cardiologist, with possible general endotracheal anesthesia with peripheral IV placement, arterial line placement, and ASA standard monitors.  The possibility of blood product transfusion was also described in detail.  Risks, benefits, alternatives of this plan were described in detail to the patient, who indicated understanding and agreed to proceed.    Mg Ivey MD

## 2024-06-14 NOTE — ANESTHESIA PROCEDURE NOTES
Airway  Date/Time: 6/14/2024 1:25 PM  Urgency: elective    Airway not difficult    Staffing  Performed: CRNA   Authorized by: Mg Ivey MD    Performed by: ERNESTO Bowles-JAVED  Patient location during procedure: OR    Indications and Patient Condition  Indications for airway management: anesthesia  Spontaneous Ventilation: absent  Sedation level: deep  Preoxygenated: yes  Patient position: sniffing  Mask difficulty assessment: 2 - vent by mask + OA or adjuvant +/- NMBA  Planned trial extubation    Final Airway Details  Final airway type: endotracheal airway      Successful airway: ETT  Cuffed: yes   Successful intubation technique: video laryngoscopy  Facilitating devices/methods: intubating stylet  Endotracheal tube insertion site: oral  Blade: Murtaza  Blade size: #4  ETT size (mm): 7.0  Cormack-Lehane Classification: grade I - full view of glottis  Placement verified by: chest auscultation and capnometry   Number of attempts at approach: 1  Ventilation between attempts: none    Additional Comments  Elective Irene intubation.

## 2024-06-15 VITALS
HEART RATE: 66 BPM | RESPIRATION RATE: 18 BRPM | TEMPERATURE: 97.2 F | DIASTOLIC BLOOD PRESSURE: 85 MMHG | SYSTOLIC BLOOD PRESSURE: 138 MMHG | OXYGEN SATURATION: 92 %

## 2024-06-15 PROBLEM — I47.20 V-TACH (MULTI): Status: ACTIVE | Noted: 2024-06-15

## 2024-06-15 PROCEDURE — 7100000011 HC EXTENDED STAY RECOVERY HOURLY - NURSING UNIT

## 2024-06-15 PROCEDURE — 2500000005 HC RX 250 GENERAL PHARMACY W/O HCPCS: Performed by: STUDENT IN AN ORGANIZED HEALTH CARE EDUCATION/TRAINING PROGRAM

## 2024-06-15 PROCEDURE — 2500000001 HC RX 250 WO HCPCS SELF ADMINISTERED DRUGS (ALT 637 FOR MEDICARE OP): Performed by: STUDENT IN AN ORGANIZED HEALTH CARE EDUCATION/TRAINING PROGRAM

## 2024-06-15 PROCEDURE — 2500000002 HC RX 250 W HCPCS SELF ADMINISTERED DRUGS (ALT 637 FOR MEDICARE OP, ALT 636 FOR OP/ED): Performed by: STUDENT IN AN ORGANIZED HEALTH CARE EDUCATION/TRAINING PROGRAM

## 2024-06-15 PROCEDURE — G0378 HOSPITAL OBSERVATION PER HR: HCPCS

## 2024-06-15 RX ORDER — LIDOCAINE HYDROCHLORIDE AND EPINEPHRINE 10; 10 MG/ML; UG/ML
10 INJECTION, SOLUTION INFILTRATION; PERINEURAL ONCE
Status: COMPLETED | OUTPATIENT
Start: 2024-06-15 | End: 2024-06-15

## 2024-06-15 RX ADMIN — SPIRONOLACTONE 12.5 MG: 25 TABLET ORAL at 08:45

## 2024-06-15 RX ADMIN — LIDOCAINE HYDROCHLORIDE AND EPINEPHRINE 10 ML: 10; 10 INJECTION, SOLUTION INFILTRATION; PERINEURAL at 01:15

## 2024-06-15 RX ADMIN — Medication 1 TABLET: at 08:45

## 2024-06-15 ASSESSMENT — PAIN SCALES - GENERAL: PAINLEVEL_OUTOF10: 0 - NO PAIN

## 2024-06-15 ASSESSMENT — COGNITIVE AND FUNCTIONAL STATUS - GENERAL
DAILY ACTIVITIY SCORE: 24
MOBILITY SCORE: 24

## 2024-06-15 NOTE — NURSING NOTE
Discharge instructions reviewed with patient. Reviewed restrictions and medications with changes,. Educated on follow up and wound care.  Receptive to teaching,. Discharged to transport in stable condition.'   NAVID Baker RN

## 2024-06-15 NOTE — DISCHARGE SUMMARY
Discharge Diagnosis  Ventricular tachycardia (Multi)    Issues Requiring Follow-Up  Will follow up with Dr. Macias in 6 weeks    Test Results Pending At Discharge  Pending Labs       No current pending labs.            Hospital Course  S/p VT ablation,  groin is fine,  she is ready to be discharged on amio and AC for 1 moth. Tele showed few PVCs no VT.         Pertinent Physical Exam At Time of Discharge  Physical Exam  Gen intact   CV s1 s2 no added sound   Chest:clear   Groin: mild bruises but no hematoma   Home Medications     Medication List      START taking these medications     rivaroxaban 20 mg tablet; Commonly known as: Xarelto; Take 1 tablet (20   mg) by mouth once daily in the evening. Take with meals. Take with food.     CONTINUE taking these medications     acetaminophen 500 mg tablet; Commonly known as: Tylenol   Adults Multivitamin 18 mg iron-400 mcg-25 mcg tablet; Generic drug:   multivitamin with minerals   amiodarone 100 mg tablet; Commonly known as: Pacerone; Take 1 tablet   (100 mg) by mouth once daily.   clindamycin 150 mg capsule; Commonly known as: Cleocin; Take 1 capsule   (150 mg) by mouth 3 times a day for 10 days.   ezetimibe 10 mg tablet; Commonly known as: Zetia; Take 1 tablet (10 mg)   by mouth once daily.   fluticasone 50 mcg/actuation nasal spray; Commonly known as: Flonase   gabapentin 300 mg capsule; Commonly known as: Neurontin; Take 1-2   capsules (300-600 mg) by mouth once daily as needed (heel pain).   metoprolol succinate XL 50 mg 24 hr tablet; Commonly known as: Toprol-XL   prasugrel 10 mg tablet; Commonly known as: Effient   * sertraline 50 mg tablet; Commonly known as: Zoloft; Take 1 tablet (50   mg) by mouth once daily.   * sertraline 100 mg tablet; Commonly known as: Zoloft; Take 1 tablet   (100 mg) by mouth once daily.   spironolactone 25 mg tablet; Commonly known as: Aldactone; Take 0.5   tablets (12.5 mg) by mouth once daily.   tiZANidine 4 mg tablet; Commonly known as:  Zanaflex; Take 1 tablet (4   mg) by mouth every 8 hours if needed for muscle spasms.  * This list has 2 medication(s) that are the same as other medications   prescribed for you. Read the directions carefully, and ask your doctor or   other care provider to review them with you.       Outpatient Follow-Up  Future Appointments   Date Time Provider Department Center   7/22/2024 11:40 AM Manuel Macias MD [id number] [facility]   8/6/2024 10:00 AM Yovany Ford MD [id number] [facility]   9/23/2024 10:30 AM Natali Sanchez MD, MS [id number] [facility]   2/5/2025  1:00 PM Yesica Reddy MD [id number] [facility]       Katina Williamson MD

## 2024-06-15 NOTE — CARE PLAN
The patient's goals for the shift include      The clinical goals for the shift include pt will remain HDSthroughout shift

## 2024-06-16 ASSESSMENT — PAIN SCALES - GENERAL: PAIN_LEVEL: 1

## 2024-06-16 NOTE — ANESTHESIA POSTPROCEDURE EVALUATION
Patient: Shani Watson    Procedure Summary       Date: 06/14/24 Room / Location: [facility] BIPLANE / Virtual [facility] MAT 3529 Cardiac Cath Lab    Anesthesia Start: 1300 Anesthesia Stop: 1743    Procedure: Ablation VT Diagnosis:       Other ventricular tachycardia (Multi)      (Other ventricular tachycardia (Multi) [I47.29])    Providers: Manuel Macias MD Responsible Provider: Huang Pugh MD    Anesthesia Type: general ASA Status: 3            Anesthesia Type: general    Vitals Value Taken Time   /85 06/15/24 1125   Temp 36.2 °C (97.2 °F) 06/15/24 1125   Pulse 66 06/15/24 1125   Resp 18 06/15/24 1125   SpO2 92 % 06/15/24 1125       Anesthesia Post Evaluation    Patient location during evaluation: PACU  Patient participation: complete - patient participated  Level of consciousness: awake  Pain score: 1  Pain management: adequate  Airway patency: patent  Cardiovascular status: acceptable  Respiratory status: acceptable  Hydration status: acceptable  Postoperative Nausea and Vomiting: none    There were no known notable events for this encounter.

## 2024-06-18 LAB
ATRIAL RATE: 60 BPM
ATRIAL RATE: 62 BPM
P AXIS: 105 DEGREES
P AXIS: 80 DEGREES
P OFFSET: 179 MS
P OFFSET: 181 MS
P ONSET: 128 MS
P ONSET: 129 MS
PR INTERVAL: 128 MS
PR INTERVAL: 164 MS
Q ONSET: 193 MS
Q ONSET: 194 MS
QRS COUNT: 10 BEATS
QRS COUNT: 10 BEATS
QRS DURATION: 158 MS
QRS DURATION: 160 MS
QT INTERVAL: 556 MS
QT INTERVAL: 564 MS
QTC CALCULATION(BAZETT): 556 MS
QTC CALCULATION(BAZETT): 572 MS
QTC FREDERICIA: 556 MS
QTC FREDERICIA: 570 MS
R AXIS: -89 DEGREES
R AXIS: 270 DEGREES
T AXIS: 95 DEGREES
T AXIS: 95 DEGREES
T OFFSET: 472 MS
T OFFSET: 475 MS
VENTRICULAR RATE: 60 BPM
VENTRICULAR RATE: 62 BPM

## 2024-06-19 ENCOUNTER — HOSPITAL ENCOUNTER (OUTPATIENT)
Dept: CARDIOLOGY | Facility: HOSPITAL | Age: 69
Discharge: HOME | End: 2024-06-19
Payer: MEDICARE

## 2024-06-19 DIAGNOSIS — I47.29 OTHER VENTRICULAR TACHYCARDIA (MULTI): ICD-10-CM

## 2024-06-19 DIAGNOSIS — I47.29 VENTRICULAR TACHYCARDIA (PAROXYSMAL) (MULTI): Primary | ICD-10-CM

## 2024-06-19 DIAGNOSIS — Z95.810 CARDIAC RESYNCHRONIZATION THERAPY DEFIBRILLATOR (CRT-D) IN PLACE: ICD-10-CM

## 2024-06-19 DIAGNOSIS — I44.2 ATRIOVENTRICULAR BLOCK, COMPLETE (MULTI): ICD-10-CM

## 2024-06-19 RX ORDER — METOPROLOL SUCCINATE 50 MG/1
50 TABLET, EXTENDED RELEASE ORAL DAILY
Qty: 90 TABLET | Refills: 1 | Status: SHIPPED | OUTPATIENT
Start: 2024-06-19 | End: 2024-12-16

## 2024-06-20 ENCOUNTER — APPOINTMENT (OUTPATIENT)
Dept: CARDIOLOGY | Facility: HOSPITAL | Age: 69
End: 2024-06-20
Payer: MEDICARE

## 2024-06-21 ENCOUNTER — DOCUMENTATION (OUTPATIENT)
Dept: INFUSION THERAPY | Facility: HOSPITAL | Age: 69
End: 2024-06-21
Payer: MEDICARE

## 2024-06-21 ENCOUNTER — TELEPHONE (OUTPATIENT)
Dept: CARDIOLOGY | Facility: CLINIC | Age: 69
End: 2024-06-21

## 2024-06-21 DIAGNOSIS — I10 BENIGN ESSENTIAL HYPERTENSION: ICD-10-CM

## 2024-06-21 DIAGNOSIS — I50.22 CHRONIC SYSTOLIC HEART FAILURE (MULTI): Primary | ICD-10-CM

## 2024-06-21 DIAGNOSIS — I25.700 ATHEROSCLEROSIS OF CORONARY ARTERY BYPASS GRAFT OF NATIVE HEART WITH UNSTABLE ANGINA PECTORIS (MULTI): ICD-10-CM

## 2024-06-21 NOTE — PROGRESS NOTES
Spoke with patient, she denies having any shortness of breath and is reminded to follow her fluid and sodium restrictions due to medtronic optivol levels. Pt states she is recovering from a recent procedure. Pt advised to take her medications as prescribed and to call cardiology with any fluid overload symptoms. Pt verbalizes understanding of instructions.

## 2024-06-24 NOTE — PROGRESS NOTES
Counseling:  The patient was counseled regarding diagnostic results, instructions for management, risk factor reductions, prognosis, patient and family education, impressions, risks and benefits of treatment options and importance of compliance with treatment.      Chief Complaint:   The patient presents today for 6-month followup of CAD, CMP, heart failure and dyslipidemia.      History Of Present Illness:    Sahni Watson is a 68 year old female patient who presents today for 6-month followup of CAD, CMP, heart failure, and dyslipidemia. Her PMH is significant for CAD s/p CABG x1 (LIMA-LAD), intraoperative RV lead removal 10/2018, CRT-D upgrade 11/2018, VT ablation 01/2019 with repeat VT ablation 06/14/2024, anemia, osteoporosis, hyperlipidemia, anxiety, depression and heart failure. The patient was seen by Dr. Macias on 03/25/2024, at which time she reported chest pressure that resolved with rest. Device interrogation at that time showed normal device function with a high burden of BiV pacing with evidence of multiple runs of slow sustained VT. Dr. Macias recommended VT ablation with scar modification, and also recommended ischemic workup with [facility] given his concern for LAD territory ischemia. LHC performed 04/11/2024 revealed moderate non-obstructive CAD with a patent LIMA to LAD. The patient is now s/p VT ablation on 06/14/2024.    The patient had an VT ablation and has a follow-up appointment on July 22 with Dr. Macias. Her device has not been checked since the procedure on 6/14/24. The wound on her groin is healing now. She relates the procedure required she be flat on her broken back for the whole day. She is feeling fatigued and notes lightheadedness and dizziness. She states that on Saturday night her belly was so swollen and she was having GERD symptoms. All week she has been very tired. She is concerned about the expense of tests and medical care. She states that financially and emotionally she is about to  "\"crash.\"  She notes the stress is affecting her breathing. She notes that a blood thinner was added to her medication regimen. She will go for blood work and a CT scan today. She also relates that she is hungry.      Last Recorded Vitals:  Vitals:    06/25/24 1149   BP: 98/70   BP Location: Left arm   Pulse: 85   Weight: 63 kg (139 lb)   Height: 1.549 m (5' 1\")       Past Surgical History:  She has a past surgical history that includes Tonsillectomy (11/16/2016); Dilation and curettage of uterus (11/16/2016); Cataract extraction (11/16/2016); Other surgical history (06/11/2019); Other surgical history (04/03/2019); Cardiac pacemaker placement (05/01/2018); Cardiac surgery; Cardiac catheterization (N/A, 4/11/2024); and Cardiac electrophysiology procedure (N/A, 6/14/2024).      Social History:  She reports that she has never smoked. She has never used smokeless tobacco. She reports current alcohol use. She reports that she does not use drugs.    Family History:  Family History   Problem Relation Name Age of Onset    Heart disease Mother      Hypertension Mother      Coronary artery disease Mother      Osteoporosis Mother      Heart disease Father      Melanoma Father      Gout Father      Prostate cancer Father      Hypertension Brother      Amblyopia Brother      Cancer Father's Sister      Hypertension Maternal Grandfather      Heart attack Maternal Grandfather      Coronary artery disease Maternal Grandfather      Liver cancer Paternal Grandmother      Breast cancer Cousin          Allergies:  Bactrim [sulfamethoxazole-trimethoprim], Erythromycin, Amoxicillin, Milk, Statins-hmg-coa reductase inhibitors, Valacyclovir, Codeine, Diazepam, Latex, Meperidine, Penicillins, and Sulfa (sulfonamide antibiotics)    Outpatient Medications:  Current Outpatient Medications   Medication Instructions    acetaminophen (TYLENOL) 500 mg, oral, Every 6 hours PRN    amiodarone (PACERONE) 100 mg, oral, Daily    ezetimibe (ZETIA) 10 mg, " oral, Daily    fluticasone (Flonase) 50 mcg/actuation nasal spray 1 spray, nasal, 2 times daily    gabapentin (NEURONTIN) 300-600 mg, oral, Daily PRN    metoprolol succinate XL (TOPROL-XL) 50 mg, oral, Daily    multivitamin-min-iron-FA-vit K (Adults Multivitamin) 18 mg iron-400 mcg-25 mcg tablet 1 tablet, oral, Daily    prasugrel (Effient) 10 mg tablet 1 tablet, oral, Daily    rivaroxaban (XARELTO) 20 mg, oral, Daily with evening meal, Take with food.    sertraline (ZOLOFT) 50 mg, oral, Daily    sertraline (ZOLOFT) 100 mg, oral, Daily    spironolactone (ALDACTONE) 12.5 mg, oral, Daily    tiZANidine (ZANAFLEX) 4 mg, oral, Every 8 hours PRN     Review of Systems   All other systems reviewed and are negative.     Physical Exam:  Constitutional:       Appearance: Healthy appearance. Not in distress.   Neck:      Vascular: No JVR. JVD normal.   Pulmonary:      Effort: Pulmonary effort is normal.      Breath sounds: Normal breath sounds. No wheezing. No rhonchi. No rales.   Chest:      Chest wall: Not tender to palpatation.   Cardiovascular:      PMI at left midclavicular line. Normal rate. Regular rhythm. Normal S1. Normal S2.       Murmurs: There is no murmur.      No gallop.  No click. No rub.   Pulses:     Intact distal pulses.   Edema:     Peripheral edema absent.   Abdominal:      General: Bowel sounds are normal.      Palpations: Abdomen is soft.      Tenderness: There is no abdominal tenderness.   Musculoskeletal: Normal range of motion.         General: No tenderness. Skin:     General: Skin is warm and dry.   Neurological:      General: No focal deficit present.      Mental Status: Alert and oriented to person, place and time.          Last Labs:  CBC -  Lab Results   Component Value Date    WBC 8.6 04/09/2024    HGB 14.7 04/09/2024    HCT 43.4 04/09/2024    MCV 89 04/09/2024     04/09/2024       CMP -  Lab Results   Component Value Date    CALCIUM 9.1 04/09/2024    PHOS 5.4 (H) 11/15/2018    PROT 6.5  02/05/2024    ALBUMIN 4.3 02/05/2024    AST 40 (H) 02/05/2024    ALT 28 02/05/2024    ALKPHOS 112 02/05/2024    BILITOT 1.0 02/05/2024       LIPID PANEL -   Lab Results   Component Value Date    CHOL 175 02/05/2024    TRIG 215 (H) 02/05/2024    HDL 50.6 02/05/2024    CHHDL 3.5 02/05/2024    LDLF 100 (H) 11/10/2022    VLDL 43 (H) 02/05/2024    NHDL 124 02/05/2024       RENAL FUNCTION PANEL -   Lab Results   Component Value Date    GLUCOSE 117 (H) 04/09/2024     (L) 04/09/2024    K 4.2 04/09/2024    CL 99 04/09/2024    CO2 27 04/09/2024    ANIONGAP 9 (L) 04/09/2024    BUN 16 04/09/2024    CREATININE 1.14 (H) 04/09/2024    CALCIUM 9.1 04/09/2024    PHOS 5.4 (H) 11/15/2018    ALBUMIN 4.3 02/05/2024        Lab Results   Component Value Date    HGBA1C 5.5 10/26/2018       Last Cardiology Tests:  06/14/2024 - Electrophysiology Procedure  VT ablation.    04/11/2024 - Cardiac Catheterization (LH)  1. Moderate non-obstructive coronary artery disease.  2. Patent LIMA to LAD.  3. Left Ventricular end-diastolic pressure = 12.  4. Normal LV filling pressures.    01/03/2024 - TTE  1. Left ventricular systolic function is mildly decreased with a 45% estimated ejection fraction.  2. Entire lateral wall and entire anterior wall are abnormal.  3. Spectral Doppler shows a restrictive pattern of left ventricular diastolic filling.  4. There is low normal right ventricular systolic function.  5. The left atrium is moderately dilated.  6. Moderate mitral valve regurgitation.  7. Mild to moderate tricuspid regurgitation.    03/30/2023 - Vascular Lab PVR [facility] Only   1. Right Lower PVR: No evidence of arterial occlusive disease in the right lower extremity at rest. Triphasic flow is noted in the right common femoral artery, right posterior tibial artery and right dorsalis pedis artery. No TBI on the right digit due to flat lined waveform.  2. Left Lower PVR: No evidence of arterial occlusive disease in the left lower extremity at rest.  Decreased digital perfusion noted. Triphasic flow is noted in the left common femoral artery, left posterior tibial artery and left dorsalis pedis artery.     10/11/2022 - TTE  1. Left ventricular systolic function is mildly to moderately decreased with a 40-45% estimated ejection fraction.  2. Multiple segmental abnormalities exist. See findings.  3. Spectral Doppler shows a pseudonormal pattern of left ventricular diastolic filling.  4. There is low normal right ventricular systolic function.  5. The left atrium is moderately dilated.  6. Moderate mitral valve regurgitation.  7. Moderately decreased mitral valve posterior leaflet mobility.  8. Aortic valve stenosis is not present.     04/21/2022 - Cardiac Catheterization (LH)  1. Single vessel coronary artery disease with proximal left anterior descending involvement.  2. Patent LIMA to LAD.  3. Elevated LV filling pressures.  4. Left Ventricular end-diastolic pressure = 20.     03/21/2022 - Stress Test  1. Nondiagnostic regadenoson stress secondary to paced rhythm with C/O chest tightness.  2. Medium-sized area of fixed perfusion defect of the inferolateral segment consistent with myocardial scar or hibernating myocardium in left circumflex territory. Medium-sized area of partially reversible perfusion defect of the anterior segment, consistent with mixed scar/ischemia in LAD territory. Mild left ventricular systolic dysfunction on post stress gated imaging.     09/02/2021 - TTE  1. The left ventricular systolic function is mildly decreased with a 50-55% estimated ejection fraction.  2. HYpokinetic inferolateral segment.  3. Spectral Doppler shows a restrictive pattern of left ventricular diastolic filling.  4. There is an elevated left ventricular end diastolic pressure.  5. The left atrium is moderately dilated.  6. Moderate mitral valve regurgitation.  7. There is mild to moderate tricuspid regurgitation.     01/14/2020 - [facility]   1. The left ventricular systolic  function is moderately decreased with a 35-40% estimated ejection fraction.  2. The left atrium is moderate to severely dilated.  3. The right atrium is moderately dilated.  4. There is global hypokinesis of the left ventricle with minor regional variations.  5. There is plaque visualized in the descending aorta.  6. There is plaque visualized in the transverse aorta.     12/31/2019 - TTE  1. The left ventricular systolic function is moderately decreased with a 40% estimated ejection fraction.  2. Spectral Doppler shows a pseudonormal pattern of left ventricular diastolic filling.  3. The left atrium is moderately dilated.  4. The right atrium is moderately dilated.  5. Moderately severe mitral valve regurgitation.  6. There is mild to moderate tricuspid regurgitation.     11/06/2018 - Cardiac Catheterization (LH)  1. Severe ostial LAD disease.  2. Patent LIMA-LAD.  3. Moderate diffuse proximal RCA disease with negative FFR.  4. Management of HF/VT per cardiology and CT surgery teams.     10/31/2018 - TTE  1. The left ventricular systolic function is moderately decreased with a 30-35% estimated ejection fraction.  2. Multiple segmental abnormalities exist. See findings.  3. Abnormal septal motion consistent with post-operative status and abnormal septal motion consistent with RV pacemaker.  4. Echocontrast given for LV opacification. Assessment of LV systolic function difficult due to pacing with abnormal septal motion as well as frequent PVCs with abnormal activation and variable RR intervals. There appears to be akinesis in the basal RCA territory and hypokinesis of the apex.  5. The left atrium is mild to moderately dilated.  6. There is a moderate pleural effusion.     10/25/2018 - Cardiac Catheterization (LH)  1. Severe ostial LAD stenosis.  2. Patent LM/LCx stent and patent proximal LAD stent.     04/19/2018 - Cardiac Catheterization  1. Double vessel coronary artery disease with proximal left anterior  descending involvement.  2. Successful PTCA/stent of LAD and Cx.     Lab review: I have personally reviewed the laboratory result(s).  Diagnostic review: I have personally reviewed the result(s) of the Echocardiogram and [facility].    Assessment/Plan   1) Mitral and Tricuspid Regurgitation  [facility] DEL CAR[facility] Jan 2020 with mild mitral regurgitation  TTE Oct 2022 with moderate MR  TTE 01/03/2024 with moderate mitral regurgitation, mild to moderate tricuspid regurgitation     2) CAD s/p CABG Oct 2018 with Ischemic CMP/Chronic Systolic Heart Failure   [facility] April 2022 with patent BRADLEY to LAD  Not on ACEi/ARB/ARNI d/t hypotension and dizziness on lisinopril  TTE Oct 2022 with LVEF 40-45%  Has ICD - follows with Dr. Macias (EP)  Well compensated on exam  TTE 01/03/2024 with LVEF 45%, low normal RV systolic function, moderate mitral regurgitation, mild to moderate tricuspid regurgitation  Seen by Dr. Macias 03/25/2024 - patient reports chest pressure, device interrogation showed normal device function with high burden of BiV pacing with evidence of multiple runs of slow sustained VT, recommendations made for VT ablation with lat scar modification, recommendations for ischemic workup with [facility] s/t concern re: LAD territory ischemia.   [facility] 04/11/2024 with moderate non-obstructive CAD, patent LIMA to LAD.    3) Hyperlipidemia  Goal LDL <70  On Zetia 10 mg daily, Nexletol 180 mg daily    Intolerant of statins s/t diarrhea   Educated regarding Mediterranean style of eating - handout given   Lipid panel 02/05/2024 with LDL of 81; not at goal    4) VT s/p VT ablation 01/2019 and BIVICD, s/p Repeat VT Ablation 06/14/2024  On amiodarone and Metoprolol  Follows with EP - Dr. Macias  Seen by Dr. Macias 03/25/2024 - patient reports chest pressure, device interrogation showed normal device function with high burden of BiV pacing with evidence of multiple runs of slow sustained VT, recommendations made for VT ablation with lat scar modification, recommendations for  ischemic workup with [facility] s/t concern re: LAD territory ischemia.   S/P VT ablation 06/14/2024 6/25/2024:  Patient with fatigue/tiredness and dizziness post ablation (apparently complicated by bleeding in the groin as per patient)  \Check CBC/BMP  Check CT abdomen/pelvis stat  Follow up 2 weeks     5) Right Neck Lesion with h/o Melanoma  Followed by dermatology     6) Claudication, Discoloration to Toes - Chilblains   Patient called our office on 03/23/2023 to report discoloration to toes and claudication  [facility] 03/30/2023 negative for arterial occlusive disease bilaterally  Evaluated by Dr. Sanchez on 05/08/2023 - diagnosed with Chilblains       Scribe Attestation  By signing my name below, I, Mili Lemus   attest that this documentation has been prepared under the direction and in the presence of Liban Campa MD.    Scribe Attestation  By signing my name below, I, Mili Toth   attest that this documentation has been prepared under the direction and in the presence of Liban Campa MD.

## 2024-06-25 ENCOUNTER — LAB (OUTPATIENT)
Dept: LAB | Facility: LAB | Age: 69
End: 2024-06-25
Payer: MEDICARE

## 2024-06-25 ENCOUNTER — APPOINTMENT (OUTPATIENT)
Dept: CARDIOLOGY | Facility: CLINIC | Age: 69
End: 2024-06-25
Payer: MEDICARE

## 2024-06-25 VITALS
BODY MASS INDEX: 26.24 KG/M2 | HEART RATE: 85 BPM | HEIGHT: 61 IN | SYSTOLIC BLOOD PRESSURE: 98 MMHG | DIASTOLIC BLOOD PRESSURE: 70 MMHG | WEIGHT: 139 LBS

## 2024-06-25 DIAGNOSIS — R10.30 LOWER ABDOMINAL PAIN: ICD-10-CM

## 2024-06-25 DIAGNOSIS — I25.10 CAD IN NATIVE ARTERY: ICD-10-CM

## 2024-06-25 DIAGNOSIS — I25.10 CAD IN NATIVE ARTERY: Primary | ICD-10-CM

## 2024-06-25 LAB
ALBUMIN SERPL BCP-MCNC: 4.3 G/DL (ref 3.4–5)
ALP SERPL-CCNC: 109 U/L (ref 33–136)
ALT SERPL W P-5'-P-CCNC: 12 U/L (ref 7–45)
ANION GAP SERPL CALC-SCNC: 11 MMOL/L (ref 10–20)
AST SERPL W P-5'-P-CCNC: 19 U/L (ref 9–39)
BASOPHILS # BLD AUTO: 0.05 X10*3/UL (ref 0–0.1)
BASOPHILS NFR BLD AUTO: 0.7 %
BILIRUB SERPL-MCNC: 1.4 MG/DL (ref 0–1.2)
BUN SERPL-MCNC: 12 MG/DL (ref 6–23)
CALCIUM SERPL-MCNC: 9 MG/DL (ref 8.6–10.3)
CHLORIDE SERPL-SCNC: 107 MMOL/L (ref 98–107)
CO2 SERPL-SCNC: 27 MMOL/L (ref 21–32)
CREAT SERPL-MCNC: 1.22 MG/DL (ref 0.5–1.05)
EGFRCR SERPLBLD CKD-EPI 2021: 48 ML/MIN/1.73M*2
EOSINOPHIL # BLD AUTO: 0.21 X10*3/UL (ref 0–0.7)
EOSINOPHIL NFR BLD AUTO: 2.7 %
ERYTHROCYTE [DISTWIDTH] IN BLOOD BY AUTOMATED COUNT: 13.8 % (ref 11.5–14.5)
GLUCOSE SERPL-MCNC: 82 MG/DL (ref 74–99)
HCT VFR BLD AUTO: 37.8 % (ref 36–46)
HGB BLD-MCNC: 12.6 G/DL (ref 12–16)
IMM GRANULOCYTES # BLD AUTO: 0.02 X10*3/UL (ref 0–0.7)
IMM GRANULOCYTES NFR BLD AUTO: 0.3 % (ref 0–0.9)
LYMPHOCYTES # BLD AUTO: 1.17 X10*3/UL (ref 1.2–4.8)
LYMPHOCYTES NFR BLD AUTO: 15.3 %
MCH RBC QN AUTO: 30.1 PG (ref 26–34)
MCHC RBC AUTO-ENTMCNC: 33.3 G/DL (ref 32–36)
MCV RBC AUTO: 90 FL (ref 80–100)
MONOCYTES # BLD AUTO: 0.41 X10*3/UL (ref 0.1–1)
MONOCYTES NFR BLD AUTO: 5.4 %
NEUTROPHILS # BLD AUTO: 5.8 X10*3/UL (ref 1.2–7.7)
NEUTROPHILS NFR BLD AUTO: 75.6 %
NRBC BLD-RTO: 0 /100 WBCS (ref 0–0)
PLATELET # BLD AUTO: 263 X10*3/UL (ref 150–450)
POTASSIUM SERPL-SCNC: 4.7 MMOL/L (ref 3.5–5.3)
PROT SERPL-MCNC: 6 G/DL (ref 6.4–8.2)
RBC # BLD AUTO: 4.19 X10*6/UL (ref 4–5.2)
SODIUM SERPL-SCNC: 140 MMOL/L (ref 136–145)
WBC # BLD AUTO: 7.7 X10*3/UL (ref 4.4–11.3)

## 2024-06-25 PROCEDURE — 3074F SYST BP LT 130 MM HG: CPT | Performed by: INTERNAL MEDICINE

## 2024-06-25 PROCEDURE — 1160F RVW MEDS BY RX/DR IN RCRD: CPT | Performed by: INTERNAL MEDICINE

## 2024-06-25 PROCEDURE — 93000 ELECTROCARDIOGRAM COMPLETE: CPT | Performed by: INTERNAL MEDICINE

## 2024-06-25 PROCEDURE — 80053 COMPREHEN METABOLIC PANEL: CPT

## 2024-06-25 PROCEDURE — 1123F ACP DISCUSS/DSCN MKR DOCD: CPT | Performed by: INTERNAL MEDICINE

## 2024-06-25 PROCEDURE — 36415 COLL VENOUS BLD VENIPUNCTURE: CPT

## 2024-06-25 PROCEDURE — 99213 OFFICE O/P EST LOW 20 MIN: CPT | Performed by: INTERNAL MEDICINE

## 2024-06-25 PROCEDURE — 85025 COMPLETE CBC W/AUTO DIFF WBC: CPT

## 2024-06-25 PROCEDURE — 3078F DIAST BP <80 MM HG: CPT | Performed by: INTERNAL MEDICINE

## 2024-06-25 PROCEDURE — 1159F MED LIST DOCD IN RCRD: CPT | Performed by: INTERNAL MEDICINE

## 2024-06-25 NOTE — LETTER
June 25, 2024     Yovany Ford MD  [address]  [facility], [address] [place] [id number]    Patient: FAYE Watson   YOB: 1955   Date of Visit: 6/25/2024       Dear Dr. Yovany Ford MD:    Thank you for referring FAYE Watson to me for evaluation. Below are my notes for this consultation.  If you have questions, please do not hesitate to call me. I look forward to following your patient along with you.       Sincerely,     Liban Campa MD      CC: No Recipients  ______________________________________________________________________________________    Counseling:  The patient was counseled regarding diagnostic results, instructions for management, risk factor reductions, prognosis, patient and family education, impressions, risks and benefits of treatment options and importance of compliance with treatment.      Chief Complaint:   The patient presents today for 6-month followup of CAD, CMP, heart failure and dyslipidemia.      History Of Present Illness:    Faye Watson is a 68 year old female patient who presents today for 6-month followup of CAD, CMP, heart failure, and dyslipidemia. Her PMH is significant for CAD s/p CABG x1 (LIMA-LAD), intraoperative RV lead removal 10/2018, CRT-D upgrade 11/2018, VT ablation 01/2019 with repeat VT ablation 06/14/2024, anemia, osteoporosis, hyperlipidemia, anxiety, depression and heart failure. The patient was seen by Dr. Macias on 03/25/2024, at which time she reported chest pressure that resolved with rest. Device interrogation at that time showed normal device function with a high burden of BiV pacing with evidence of multiple runs of slow sustained VT. Dr. Macias recommended VT ablation with scar modification, and also recommended ischemic workup with [facility] given his concern for LAD territory ischemia. LHC performed 04/11/2024 revealed moderate non-obstructive CAD with a patent LIMA to LAD. The patient is now s/p VT ablation on  "06/14/2024.    The patient had an VT ablation and has a follow-up appointment on July 22 with Dr. Macias. Her device has not been checked since the procedure on 6/14/24. The wound on her groin is healing now. She relates the procedure required she be flat on her broken back for the whole day. She is feeling fatigued and notes lightheadedness and dizziness. She states that on Saturday night her belly was so swollen and she was having GERD symptoms. All week she has been very tired. She is concerned about the expense of tests and medical care. She states that financially and emotionally she is about to \"crash.\"  She notes the stress is affecting her breathing. She notes that a blood thinner was added to her medication regimen. She will go for blood work and a CT scan today. She also relates that she is hungry.      Last Recorded Vitals:  Vitals:    06/25/24 1149   BP: 98/70   BP Location: Left arm   Pulse: 85   Weight: 63 kg (139 lb)   Height: 1.549 m (5' 1\")       Past Surgical History:  She has a past surgical history that includes Tonsillectomy (11/16/2016); Dilation and curettage of uterus (11/16/2016); Cataract extraction (11/16/2016); Other surgical history (06/11/2019); Other surgical history (04/03/2019); Cardiac pacemaker placement (05/01/2018); Cardiac surgery; Cardiac catheterization (N/A, 4/11/2024); and Cardiac electrophysiology procedure (N/A, 6/14/2024).      Social History:  She reports that she has never smoked. She has never used smokeless tobacco. She reports current alcohol use. She reports that she does not use drugs.    Family History:  Family History   Problem Relation Name Age of Onset   • Heart disease Mother     • Hypertension Mother     • Coronary artery disease Mother     • Osteoporosis Mother     • Heart disease Father     • Melanoma Father     • Gout Father     • Prostate cancer Father     • Hypertension Brother     • Amblyopia Brother     • Cancer Father's Sister     • Hypertension " Maternal Grandfather     • Heart attack Maternal Grandfather     • Coronary artery disease Maternal Grandfather     • Liver cancer Paternal Grandmother     • Breast cancer Cousin          Allergies:  Bactrim [sulfamethoxazole-trimethoprim], Erythromycin, Amoxicillin, Milk, Statins-hmg-coa reductase inhibitors, Valacyclovir, Codeine, Diazepam, Latex, Meperidine, Penicillins, and Sulfa (sulfonamide antibiotics)    Outpatient Medications:  Current Outpatient Medications   Medication Instructions   • acetaminophen (TYLENOL) 500 mg, oral, Every 6 hours PRN   • amiodarone (PACERONE) 100 mg, oral, Daily   • ezetimibe (ZETIA) 10 mg, oral, Daily   • fluticasone (Flonase) 50 mcg/actuation nasal spray 1 spray, nasal, 2 times daily   • gabapentin (NEURONTIN) 300-600 mg, oral, Daily PRN   • metoprolol succinate XL (TOPROL-XL) 50 mg, oral, Daily   • multivitamin-min-iron-FA-vit K (Adults Multivitamin) 18 mg iron-400 mcg-25 mcg tablet 1 tablet, oral, Daily   • prasugrel (Effient) 10 mg tablet 1 tablet, oral, Daily   • rivaroxaban (XARELTO) 20 mg, oral, Daily with evening meal, Take with food.   • sertraline (ZOLOFT) 50 mg, oral, Daily   • sertraline (ZOLOFT) 100 mg, oral, Daily   • spironolactone (ALDACTONE) 12.5 mg, oral, Daily   • tiZANidine (ZANAFLEX) 4 mg, oral, Every 8 hours PRN     Review of Systems   All other systems reviewed and are negative.     Physical Exam:  Constitutional:       Appearance: Healthy appearance. Not in distress.   Neck:      Vascular: No JVR. JVD normal.   Pulmonary:      Effort: Pulmonary effort is normal.      Breath sounds: Normal breath sounds. No wheezing. No rhonchi. No rales.   Chest:      Chest wall: Not tender to palpatation.   Cardiovascular:      PMI at left midclavicular line. Normal rate. Regular rhythm. Normal S1. Normal S2.       Murmurs: There is no murmur.      No gallop.  No click. No rub.   Pulses:     Intact distal pulses.   Edema:     Peripheral edema absent.   Abdominal:       General: Bowel sounds are normal.      Palpations: Abdomen is soft.      Tenderness: There is no abdominal tenderness.   Musculoskeletal: Normal range of motion.         General: No tenderness. Skin:     General: Skin is warm and dry.   Neurological:      General: No focal deficit present.      Mental Status: Alert and oriented to person, place and time.          Last Labs:  CBC -  Lab Results   Component Value Date    WBC 8.6 04/09/2024    HGB 14.7 04/09/2024    HCT 43.4 04/09/2024    MCV 89 04/09/2024     04/09/2024       CMP -  Lab Results   Component Value Date    CALCIUM 9.1 04/09/2024    PHOS 5.4 (H) 11/15/2018    PROT 6.5 02/05/2024    ALBUMIN 4.3 02/05/2024    AST 40 (H) 02/05/2024    ALT 28 02/05/2024    ALKPHOS 112 02/05/2024    BILITOT 1.0 02/05/2024       LIPID PANEL -   Lab Results   Component Value Date    CHOL 175 02/05/2024    TRIG 215 (H) 02/05/2024    HDL 50.6 02/05/2024    CHHDL 3.5 02/05/2024    LDLF 100 (H) 11/10/2022    VLDL 43 (H) 02/05/2024    NHDL 124 02/05/2024       RENAL FUNCTION PANEL -   Lab Results   Component Value Date    GLUCOSE 117 (H) 04/09/2024     (L) 04/09/2024    K 4.2 04/09/2024    CL 99 04/09/2024    CO2 27 04/09/2024    ANIONGAP 9 (L) 04/09/2024    BUN 16 04/09/2024    CREATININE 1.14 (H) 04/09/2024    CALCIUM 9.1 04/09/2024    PHOS 5.4 (H) 11/15/2018    ALBUMIN 4.3 02/05/2024        Lab Results   Component Value Date    HGBA1C 5.5 10/26/2018       Last Cardiology Tests:  06/14/2024 - Electrophysiology Procedure  VT ablation.    04/11/2024 - Cardiac Catheterization (LH)  1. Moderate non-obstructive coronary artery disease.  2. Patent LIMA to LAD.  3. Left Ventricular end-diastolic pressure = 12.  4. Normal LV filling pressures.    01/03/2024 - TTE  1. Left ventricular systolic function is mildly decreased with a 45% estimated ejection fraction.  2. Entire lateral wall and entire anterior wall are abnormal.  3. Spectral Doppler shows a restrictive pattern of  left ventricular diastolic filling.  4. There is low normal right ventricular systolic function.  5. The left atrium is moderately dilated.  6. Moderate mitral valve regurgitation.  7. Mild to moderate tricuspid regurgitation.    03/30/2023 - Vascular Lab PVR [facility] Only   1. Right Lower PVR: No evidence of arterial occlusive disease in the right lower extremity at rest. Triphasic flow is noted in the right common femoral artery, right posterior tibial artery and right dorsalis pedis artery. No TBI on the right digit due to flat lined waveform.  2. Left Lower PVR: No evidence of arterial occlusive disease in the left lower extremity at rest. Decreased digital perfusion noted. Triphasic flow is noted in the left common femoral artery, left posterior tibial artery and left dorsalis pedis artery.     10/11/2022 - TTE  1. Left ventricular systolic function is mildly to moderately decreased with a 40-45% estimated ejection fraction.  2. Multiple segmental abnormalities exist. See findings.  3. Spectral Doppler shows a pseudonormal pattern of left ventricular diastolic filling.  4. There is low normal right ventricular systolic function.  5. The left atrium is moderately dilated.  6. Moderate mitral valve regurgitation.  7. Moderately decreased mitral valve posterior leaflet mobility.  8. Aortic valve stenosis is not present.     04/21/2022 - Cardiac Catheterization (LH)  1. Single vessel coronary artery disease with proximal left anterior descending involvement.  2. Patent LIMA to LAD.  3. Elevated LV filling pressures.  4. Left Ventricular end-diastolic pressure = 20.     03/21/2022 - Stress Test  1. Nondiagnostic regadenoson stress secondary to paced rhythm with C/O chest tightness.  2. Medium-sized area of fixed perfusion defect of the inferolateral segment consistent with myocardial scar or hibernating myocardium in left circumflex territory. Medium-sized area of partially reversible perfusion defect of the anterior  segment, consistent with mixed scar/ischemia in LAD territory. Mild left ventricular systolic dysfunction on post stress gated imaging.     09/02/2021 - TTE  1. The left ventricular systolic function is mildly decreased with a 50-55% estimated ejection fraction.  2. HYpokinetic inferolateral segment.  3. Spectral Doppler shows a restrictive pattern of left ventricular diastolic filling.  4. There is an elevated left ventricular end diastolic pressure.  5. The left atrium is moderately dilated.  6. Moderate mitral valve regurgitation.  7. There is mild to moderate tricuspid regurgitation.     01/14/2020 - [facility]IA DEL CARMEN   1. The left ventricular systolic function is moderately decreased with a 35-40% estimated ejection fraction.  2. The left atrium is moderate to severely dilated.  3. The right atrium is moderately dilated.  4. There is global hypokinesis of the left ventricle with minor regional variations.  5. There is plaque visualized in the descending aorta.  6. There is plaque visualized in the transverse aorta.     12/31/2019 - TTE  1. The left ventricular systolic function is moderately decreased with a 40% estimated ejection fraction.  2. Spectral Doppler shows a pseudonormal pattern of left ventricular diastolic filling.  3. The left atrium is moderately dilated.  4. The right atrium is moderately dilated.  5. Moderately severe mitral valve regurgitation.  6. There is mild to moderate tricuspid regurgitation.     11/06/2018 - Cardiac Catheterization (LH)  1. Severe ostial LAD disease.  2. Patent LIMA-LAD.  3. Moderate diffuse proximal RCA disease with negative FFR.  4. Management of HF/VT per cardiology and CT surgery teams.     10/31/2018 - TTE  1. The left ventricular systolic function is moderately decreased with a 30-35% estimated ejection fraction.  2. Multiple segmental abnormalities exist. See findings.  3. Abnormal septal motion consistent with post-operative status and abnormal septal motion consistent with  RV pacemaker.  4. Echocontrast given for LV opacification. Assessment of LV systolic function difficult due to pacing with abnormal septal motion as well as frequent PVCs with abnormal activation and variable RR intervals. There appears to be akinesis in the basal RCA territory and hypokinesis of the apex.  5. The left atrium is mild to moderately dilated.  6. There is a moderate pleural effusion.     10/25/2018 - Cardiac Catheterization (LH)  1. Severe ostial LAD stenosis.  2. Patent LM/LCx stent and patent proximal LAD stent.     04/19/2018 - Cardiac Catheterization  1. Double vessel coronary artery disease with proximal left anterior descending involvement.  2. Successful PTCA/stent of LAD and Cx.     Lab review: I have personally reviewed the laboratory result(s).  Diagnostic review: I have personally reviewed the result(s) of the Echocardiogram and C.    Assessment/Plan  1) Mitral and Tricuspid Regurgitation  MARIA DEL CARMEN Jan 2020 with mild mitral regurgitation  TTE Oct 2022 with moderate MR  TTE 01/03/2024 with moderate mitral regurgitation, mild to moderate tricuspid regurgitation     2) CAD s/p CABG Oct 2018 with Ischemic CMP/Chronic Systolic Heart Failure   C April 2022 with patent BRADLEY to LAD  Not on ACEi/ARB/ARNI d/t hypotension and dizziness on lisinopril  TTE Oct 2022 with LVEF 40-45%  Has ICD - follows with Dr. Macias (EP)  Well compensated on exam  TTE 01/03/2024 with LVEF 45%, low normal RV systolic function, moderate mitral regurgitation, mild to moderate tricuspid regurgitation  Seen by Dr. Macias 03/25/2024 - patient reports chest pressure, device interrogation showed normal device function with high burden of BiV pacing with evidence of multiple runs of slow sustained VT, recommendations made for VT ablation with lat scar modification, recommendations for ischemic workup with [facility] s/t concern re: LAD territory ischemia.   [facility] 04/11/2024 with moderate non-obstructive CAD, patent LIMA to LAD.    3)  Hyperlipidemia  Goal LDL <70  On Zetia 10 mg daily, Nexletol 180 mg daily    Intolerant of statins s/t diarrhea   Educated regarding Mediterranean style of eating - handout given   Lipid panel 02/05/2024 with LDL of 81; not at goal    4) VT s/p VT ablation 01/2019 and BIVICD, s/p Repeat VT Ablation 06/14/2024  On amiodarone and Metoprolol  Follows with EP - Dr. Macias  Seen by Dr. Macias 03/25/2024 - patient reports chest pressure, device interrogation showed normal device function with high burden of BiV pacing with evidence of multiple runs of slow sustained VT, recommendations made for VT ablation with lat scar modification, recommendations for ischemic workup with [facility] s/t concern re: LAD territory ischemia.   S/P VT ablation 06/14/2024 6/25/2024:  Patient with fatigue/tiredness and dizziness post ablation (apparently complicated by bleeding in the groin as per patient)  \Check CBC/BMP  Check CT abdomen/pelvis stat  Follow up 2 weeks     5) Right Neck Lesion with h/o Melanoma  Followed by dermatology     6) Claudication, Discoloration to Toes - Chilblains   Patient called our office on 03/23/2023 to report discoloration to toes and claudication  KANU 03/30/2023 negative for arterial occlusive disease bilaterally  Evaluated by Dr. Sanchez on 05/08/2023 - diagnosed with Chilblains       Scribe Attestation  By signing my name below, IJohnna Scribe   attest that this documentation has been prepared under the direction and in the presence of Liban Campa MD.    Scribe Attestation  By signing my name below, I, Mili Toth   attest that this documentation has been prepared under the direction and in the presence of Liban Campa MD.

## 2024-06-26 ENCOUNTER — HOSPITAL ENCOUNTER (OUTPATIENT)
Dept: RADIOLOGY | Facility: HOSPITAL | Age: 69
Discharge: HOME | End: 2024-06-26
Payer: MEDICARE

## 2024-06-26 DIAGNOSIS — R10.30 LOWER ABDOMINAL PAIN: ICD-10-CM

## 2024-06-26 DIAGNOSIS — I25.10 CAD IN NATIVE ARTERY: ICD-10-CM

## 2024-06-26 PROCEDURE — 2550000001 HC RX 255 CONTRASTS: Performed by: INTERNAL MEDICINE

## 2024-06-26 PROCEDURE — 74174 CTA ABD&PLVS W/CONTRAST: CPT

## 2024-07-09 ENCOUNTER — APPOINTMENT (OUTPATIENT)
Dept: CARDIOLOGY | Facility: CLINIC | Age: 69
End: 2024-07-09
Payer: MEDICARE

## 2024-07-10 NOTE — PROGRESS NOTES
"Counseling:  The patient was counseled regarding diagnostic results, instructions for management, risk factor reductions, prognosis, patient and family education, impressions, risks and benefits of treatment options and importance of compliance with treatment.      Chief Complaint:   The patient presents today for 2-week followup of dizziness, lightheadedness and fatigue s/p CTA abdomen/pelvis.     History Of Present Illness:    Shani Watson is a 68 year old female patient who presents today for 2-week followup of dizziness, lightheadedness and fatigue s/p CTA abdomen/pelvis. Her PMH is significant for CAD s/p CABG x1 (LIMA-LAD), intraoperative RV lead removal 10/2018, CRT-D upgrade 11/2018, VT ablation 01/2019 with repeat VT ablation 06/14/2024, anemia, osteoporosis, hyperlipidemia, anxiety, depression and heart failure. CTA of the abdomen/pelvis performed 06/26/2024 revealed a non-flow-limiting dissection plane extending from the distal right external iliac artery through the right common femoral artery, and cholelithiasis without cholecystitis. Today, the patient reports persistent, although improved fatigue. She denies any dizziness/lightheadedness. She reports exertional SOB. The patient also reports easy bruising. BP has been stable. The patient is compliant with her prescribed medications.      Last Recorded Vitals:  Vitals:    07/11/24 1541   BP: 114/78   BP Location: Left arm   Pulse: 80   SpO2: 98%   Weight: 62.6 kg (138 lb)   Height: 1.549 m (5' 1\")       Past Surgical History:  She has a past surgical history that includes Tonsillectomy (11/16/2016); Dilation and curettage of uterus (11/16/2016); Cataract extraction (11/16/2016); Other surgical history (06/11/2019); Other surgical history (04/03/2019); Cardiac pacemaker placement (05/01/2018); Cardiac surgery; Cardiac catheterization (N/A, 4/11/2024); and Cardiac electrophysiology procedure (N/A, 6/14/2024).      Social History:  She reports that she " has never smoked. She has never used smokeless tobacco. She reports current alcohol use. She reports that she does not use drugs.    Family History:  Family History   Problem Relation Name Age of Onset    Heart disease Mother      Hypertension Mother      Coronary artery disease Mother      Osteoporosis Mother      Heart disease Father      Melanoma Father      Gout Father      Prostate cancer Father      Hypertension Brother      Amblyopia Brother      Cancer Father's Sister      Hypertension Maternal Grandfather      Heart attack Maternal Grandfather      Coronary artery disease Maternal Grandfather      Liver cancer Paternal Grandmother      Breast cancer Cousin          Allergies:  Bactrim [sulfamethoxazole-trimethoprim], Erythromycin, Amoxicillin, Milk, Statins-hmg-coa reductase inhibitors, Valacyclovir, Codeine, Diazepam, Latex, Meperidine, Penicillins, and Sulfa (sulfonamide antibiotics)    Outpatient Medications:  Current Outpatient Medications   Medication Instructions    acetaminophen (TYLENOL) 500 mg, oral, Every 6 hours PRN    amiodarone (PACERONE) 100 mg, oral, Daily    ezetimibe (ZETIA) 10 mg, oral, Daily    fluticasone (Flonase) 50 mcg/actuation nasal spray 1 spray, nasal, 2 times daily    gabapentin (NEURONTIN) 300-600 mg, oral, Daily PRN    metoprolol succinate XL (TOPROL-XL) 50 mg, oral, Daily    multivitamin-min-iron-FA-vit K (Adults Multivitamin) 18 mg iron-400 mcg-25 mcg tablet 1 tablet, oral, Daily    prasugrel (Effient) 10 mg tablet 1 tablet, oral, Daily    rivaroxaban (XARELTO) 20 mg, oral, Daily with evening meal, Take with food.    sertraline (ZOLOFT) 50 mg, oral, Daily    sertraline (ZOLOFT) 100 mg, oral, Daily    spironolactone (ALDACTONE) 12.5 mg, oral, Daily    tiZANidine (ZANAFLEX) 4 mg, oral, Every 8 hours PRN     Review of Systems   Constitutional: Positive for malaise/fatigue.   Cardiovascular:  Positive for dyspnea on exertion.   Hematologic/Lymphatic: Bruises/bleeds easily.   All  other systems reviewed and are negative.     Physical Exam:  Constitutional:       Appearance: Healthy appearance. Not in distress.   Neck:      Vascular: No JVR. JVD normal.   Pulmonary:      Effort: Pulmonary effort is normal.      Breath sounds: Normal breath sounds. No wheezing. No rhonchi. No rales.   Chest:      Chest wall: Not tender to palpatation.   Cardiovascular:      PMI at left midclavicular line. Normal rate. Regular rhythm. Normal S1. Normal S2.       Murmurs: There is no murmur.      No gallop.  No click. No rub.   Pulses:     Intact distal pulses.   Edema:     Peripheral edema absent.   Abdominal:      General: Bowel sounds are normal.      Palpations: Abdomen is soft.      Tenderness: There is no abdominal tenderness.   Musculoskeletal: Normal range of motion.         General: No tenderness. Skin:     General: Skin is warm and dry.   Neurological:      General: No focal deficit present.      Mental Status: Alert and oriented to person, place and time.          Last Labs:  CBC -  Lab Results   Component Value Date    WBC 7.7 06/25/2024    HGB 12.6 06/25/2024    HCT 37.8 06/25/2024    MCV 90 06/25/2024     06/25/2024       CMP -  Lab Results   Component Value Date    CALCIUM 9.0 06/25/2024    PHOS 5.4 (H) 11/15/2018    PROT 6.0 (L) 06/25/2024    ALBUMIN 4.3 06/25/2024    AST 19 06/25/2024    ALT 12 06/25/2024    ALKPHOS 109 06/25/2024    BILITOT 1.4 (H) 06/25/2024       LIPID PANEL -   Lab Results   Component Value Date    CHOL 175 02/05/2024    TRIG 215 (H) 02/05/2024    HDL 50.6 02/05/2024    CHHDL 3.5 02/05/2024    LDLF 100 (H) 11/10/2022    VLDL 43 (H) 02/05/2024    NHDL 124 02/05/2024       RENAL FUNCTION PANEL -   Lab Results   Component Value Date    GLUCOSE 82 06/25/2024     06/25/2024    K 4.7 06/25/2024     06/25/2024    CO2 27 06/25/2024    ANIONGAP 11 06/25/2024    BUN 12 06/25/2024    CREATININE 1.22 (H) 06/25/2024    CALCIUM 9.0 06/25/2024    PHOS 5.4 (H) 11/15/2018     ALBUMIN 4.3 06/25/2024        Lab Results   Component Value Date    HGBA1C 5.5 10/26/2018       Last Cardiology Tests:  06/14/2024 - Electrophysiology Procedure  VT ablation.    04/11/2024 - Cardiac Catheterization (LH)  1. Moderate non-obstructive coronary artery disease.  2. Patent LIMA to LAD.  3. Left Ventricular end-diastolic pressure = 12.  4. Normal LV filling pressures.    01/03/2024 - TTE  1. Left ventricular systolic function is mildly decreased with a 45% estimated ejection fraction.  2. Entire lateral wall and entire anterior wall are abnormal.  3. Spectral Doppler shows a restrictive pattern of left ventricular diastolic filling.  4. There is low normal right ventricular systolic function.  5. The left atrium is moderately dilated.  6. Moderate mitral valve regurgitation.  7. Mild to moderate tricuspid regurgitation.    03/30/2023 - Vascular Lab PVR [facility] Only   1. Right Lower PVR: No evidence of arterial occlusive disease in the right lower extremity at rest. Triphasic flow is noted in the right common femoral artery, right posterior tibial artery and right dorsalis pedis artery. No TBI on the right digit due to flat lined waveform.  2. Left Lower PVR: No evidence of arterial occlusive disease in the left lower extremity at rest. Decreased digital perfusion noted. Triphasic flow is noted in the left common femoral artery, left posterior tibial artery and left dorsalis pedis artery.     10/11/2022 - TTE  1. Left ventricular systolic function is mildly to moderately decreased with a 40-45% estimated ejection fraction.  2. Multiple segmental abnormalities exist. See findings.  3. Spectral Doppler shows a pseudonormal pattern of left ventricular diastolic filling.  4. There is low normal right ventricular systolic function.  5. The left atrium is moderately dilated.  6. Moderate mitral valve regurgitation.  7. Moderately decreased mitral valve posterior leaflet mobility.  8. Aortic valve stenosis is not  present.     04/21/2022 - Cardiac Catheterization (LH)  1. Single vessel coronary artery disease with proximal left anterior descending involvement.  2. Patent LIMA to LAD.  3. Elevated LV filling pressures.  4. Left Ventricular end-diastolic pressure = 20.     03/21/2022 - Stress Test  1. Nondiagnostic regadenoson stress secondary to paced rhythm with C/O chest tightness.  2. Medium-sized area of fixed perfusion defect of the inferolateral segment consistent with myocardial scar or hibernating myocardium in left circumflex territory. Medium-sized area of partially reversible perfusion defect of the anterior segment, consistent with mixed scar/ischemia in LAD territory. Mild left ventricular systolic dysfunction on post stress gated imaging.     09/02/2021 - TTE  1. The left ventricular systolic function is mildly decreased with a 50-55% estimated ejection fraction.  2. HYpokinetic inferolateral segment.  3. Spectral Doppler shows a restrictive pattern of left ventricular diastolic filling.  4. There is an elevated left ventricular end diastolic pressure.  5. The left atrium is moderately dilated.  6. Moderate mitral valve regurgitation.  7. There is mild to moderate tricuspid regurgitation.     01/14/2020 - [facility]   1. The left ventricular systolic function is moderately decreased with a 35-40% estimated ejection fraction.  2. The left atrium is moderate to severely dilated.  3. The right atrium is moderately dilated.  4. There is global hypokinesis of the left ventricle with minor regional variations.  5. There is plaque visualized in the descending aorta.  6. There is plaque visualized in the transverse aorta.     12/31/2019 - TTE  1. The left ventricular systolic function is moderately decreased with a 40% estimated ejection fraction.  2. Spectral Doppler shows a pseudonormal pattern of left ventricular diastolic filling.  3. The left atrium is moderately dilated.  4. The right atrium is moderately dilated.  5.  Moderately severe mitral valve regurgitation.  6. There is mild to moderate tricuspid regurgitation.     11/06/2018 - Cardiac Catheterization (LH)  1. Severe ostial LAD disease.  2. Patent LIMA-LAD.  3. Moderate diffuse proximal RCA disease with negative FFR.  4. Management of HF/VT per cardiology and CT surgery teams.     10/31/2018 - TTE  1. The left ventricular systolic function is moderately decreased with a 30-35% estimated ejection fraction.  2. Multiple segmental abnormalities exist. See findings.  3. Abnormal septal motion consistent with post-operative status and abnormal septal motion consistent with RV pacemaker.  4. Echocontrast given for LV opacification. Assessment of LV systolic function difficult due to pacing with abnormal septal motion as well as frequent PVCs with abnormal activation and variable RR intervals. There appears to be akinesis in the basal RCA territory and hypokinesis of the apex.  5. The left atrium is mild to moderately dilated.  6. There is a moderate pleural effusion.     10/25/2018 - Cardiac Catheterization (LH)  1. Severe ostial LAD stenosis.  2. Patent LM/LCx stent and patent proximal LAD stent.     04/19/2018 - Cardiac Catheterization  1. Double vessel coronary artery disease with proximal left anterior descending involvement.  2. Successful PTCA/stent of LAD and Cx.     Diagnostic review: I have personally reviewed the result(s) of the CTA Abdomen/Pelvis.    Assessment/Plan   1) Mitral and Tricuspid Regurgitation  [facility] Jan 2020 with mild mitral regurgitation  TTE Oct 2022 with moderate MR  TTE 01/03/2024 with moderate mitral regurgitation, mild to moderate tricuspid regurgitation     2) CAD s/p CABG Oct 2018 with Ischemic CMP/Chronic Systolic Heart Failure   On metoprolol succinate 50 mg daily, Xarelto 20 mg daily, spironolactone 12.5 mg daily, Prasugrel 10 mg daily  Not on ACEi/ARB/ARNI d/t hypotension and dizziness on lisinopril  [facility] April 2022 with patent LIMA to LAD  TTE  Oct 2022 with LVEF 40-45%  Has ICD - follows with Dr. Macias (EP)  Well compensated on exam  TTE 01/03/2024 with LVEF 45%, low normal RV systolic function, moderate mitral regurgitation, mild to moderate tricuspid regurgitation  Seen by Dr. Macias 03/25/2024 - patient reports chest pressure, device interrogation showed normal device function with high burden of BiV pacing with evidence of multiple runs of slow sustained VT, recommendations made for VT ablation with lat scar modification, recommendations for ischemic workup with [facility] s/t concern re: LAD territory ischemia.   [facility] 04/11/2024 with moderate non-obstructive CAD, patent LIMA to LAD.  Denies CP or discomfort  Reports exertional SOB  Discontinue Prasugrel s/t easy bruising   Continue all other medications as prescribed   Check HR CT chest to r/o amiodarone lung toxicity   Check PFTs   Followup with Demetra Duenas NP, in 3 months    3) Hyperlipidemia  Goal LDL <70  On Zetia 10 mg daily  Intolerant of statins s/t diarrhea   Educated regarding Mediterranean style of eating - handout given   Lipid panel 02/05/2024 with LDL of 81; not at goal  Continue current medical Rx  Followup with Demetra Duenas NP, in 3 months    4) VT s/p VT ablation 01/2019 and BIVICD, s/p Repeat VT Ablation 06/14/2024  On amiodarone 100 mg daily, metoprolol succinate 50 mg daily   Follows with EP - Dr. Macias  Seen by Dr. Macias 03/25/2024 - patient reports chest pressure, device interrogation showed normal device function with high burden of BiV pacing with evidence of multiple runs of slow sustained VT, recommendations made for VT ablation with lat scar modification, recommendations for ischemic workup with [facility] s/t concern re: LAD territory ischemia.   S/P VT ablation 06/14/2024  CTA abdomen/pelvis 06/26/2024 with non-flow-limiting dissection plane extending from the distal right external iliac artery through the right common femoral artery, and cholelithiasis without cholecystitis.    Dizziness/lightheadedness resolved  Reports persistent, although improved fatigue   Reports exertional SOB  Check HR CT chest to r/o amiodarone lung toxicity   Check PFTs   Followup with Demetra Duenas NP, in 3 months     5) Right Neck Lesion with h/o Melanoma  Followed by dermatology     6) Claudication, Discoloration to Toes - Chilblains   Patient called our office on 03/23/2023 to report discoloration to toes and claudication  [facility] 03/30/2023 negative for arterial occlusive disease bilaterally  Evaluated by Dr. Sanchez on 05/08/2023 - diagnosed with Chilblains       Scribe Attestation  By signing my name below, I, Mili Toth   attest that this documentation has been prepared under the direction and in the presence of Liban Campa MD.

## 2024-07-11 ENCOUNTER — APPOINTMENT (OUTPATIENT)
Dept: PRIMARY CARE | Facility: CLINIC | Age: 69
End: 2024-07-11
Payer: MEDICARE

## 2024-07-11 ENCOUNTER — OFFICE VISIT (OUTPATIENT)
Dept: CARDIOLOGY | Facility: CLINIC | Age: 69
End: 2024-07-11
Payer: MEDICARE

## 2024-07-11 VITALS
WEIGHT: 138 LBS | HEART RATE: 80 BPM | BODY MASS INDEX: 26.06 KG/M2 | HEIGHT: 61 IN | OXYGEN SATURATION: 98 % | DIASTOLIC BLOOD PRESSURE: 78 MMHG | SYSTOLIC BLOOD PRESSURE: 114 MMHG

## 2024-07-11 DIAGNOSIS — I25.10 CAD IN NATIVE ARTERY: ICD-10-CM

## 2024-07-11 DIAGNOSIS — R10.30 LOWER ABDOMINAL PAIN: ICD-10-CM

## 2024-07-11 PROCEDURE — 99213 OFFICE O/P EST LOW 20 MIN: CPT | Performed by: INTERNAL MEDICINE

## 2024-07-11 PROCEDURE — 1159F MED LIST DOCD IN RCRD: CPT | Performed by: INTERNAL MEDICINE

## 2024-07-11 PROCEDURE — 1123F ACP DISCUSS/DSCN MKR DOCD: CPT | Performed by: INTERNAL MEDICINE

## 2024-07-11 PROCEDURE — 3078F DIAST BP <80 MM HG: CPT | Performed by: INTERNAL MEDICINE

## 2024-07-11 PROCEDURE — 3074F SYST BP LT 130 MM HG: CPT | Performed by: INTERNAL MEDICINE

## 2024-07-11 PROCEDURE — 1160F RVW MEDS BY RX/DR IN RCRD: CPT | Performed by: INTERNAL MEDICINE

## 2024-07-11 ASSESSMENT — ENCOUNTER SYMPTOMS
BRUISES/BLEEDS EASILY: 1
DYSPNEA ON EXERTION: 1

## 2024-07-11 NOTE — LETTER
July 11, 2024     Yovany Ford MD  [address]  [facility] [place], [address] [place] [id number]    Patient: FAYE Watson   YOB: 1955   Date of Visit: 7/11/2024       Dear Dr. Yovany Ford MD:    Thank you for referring FAYE Watson to me for evaluation. Below are my notes for this consultation.  If you have questions, please do not hesitate to call me. I look forward to following your patient along with you.       Sincerely,     Liban Campa MD      CC: No Recipients  ______________________________________________________________________________________    Counseling:  The patient was counseled regarding diagnostic results, instructions for management, risk factor reductions, prognosis, patient and family education, impressions, risks and benefits of treatment options and importance of compliance with treatment.      Chief Complaint:   The patient presents today for 2-week followup of dizziness, lightheadedness and fatigue s/p CTA abdomen/pelvis.     History Of Present Illness:    Faye Watson is a 68 year old female patient who presents today for 2-week followup of dizziness, lightheadedness and fatigue s/p CTA abdomen/pelvis. Her PMH is significant for CAD s/p CABG x1 (LIMA-LAD), intraoperative RV lead removal 10/2018, CRT-D upgrade 11/2018, VT ablation 01/2019 with repeat VT ablation 06/14/2024, anemia, osteoporosis, hyperlipidemia, anxiety, depression and heart failure. CTA of the abdomen/pelvis performed 06/26/2024 revealed a non-flow-limiting dissection plane extending from the distal right external iliac artery through the right common femoral artery, and cholelithiasis without cholecystitis. Today, the patient reports persistent, although improved fatigue. She denies any dizziness/lightheadedness. She reports exertional SOB. The patient also reports easy bruising. BP has been stable. The patient is compliant with her prescribed medications.      Last Recorded  "Vitals:  Vitals:    07/11/24 1541   BP: 114/78   BP Location: Left arm   Pulse: 80   SpO2: 98%   Weight: 62.6 kg (138 lb)   Height: 1.549 m (5' 1\")       Past Surgical History:  She has a past surgical history that includes Tonsillectomy (11/16/2016); Dilation and curettage of uterus (11/16/2016); Cataract extraction (11/16/2016); Other surgical history (06/11/2019); Other surgical history (04/03/2019); Cardiac pacemaker placement (05/01/2018); Cardiac surgery; Cardiac catheterization (N/A, 4/11/2024); and Cardiac electrophysiology procedure (N/A, 6/14/2024).      Social History:  She reports that she has never smoked. She has never used smokeless tobacco. She reports current alcohol use. She reports that she does not use drugs.    Family History:  Family History   Problem Relation Name Age of Onset   • Heart disease Mother     • Hypertension Mother     • Coronary artery disease Mother     • Osteoporosis Mother     • Heart disease Father     • Melanoma Father     • Gout Father     • Prostate cancer Father     • Hypertension Brother     • Amblyopia Brother     • Cancer Father's Sister     • Hypertension Maternal Grandfather     • Heart attack Maternal Grandfather     • Coronary artery disease Maternal Grandfather     • Liver cancer Paternal Grandmother     • Breast cancer Cousin          Allergies:  Bactrim [sulfamethoxazole-trimethoprim], Erythromycin, Amoxicillin, Milk, Statins-hmg-coa reductase inhibitors, Valacyclovir, Codeine, Diazepam, Latex, Meperidine, Penicillins, and Sulfa (sulfonamide antibiotics)    Outpatient Medications:  Current Outpatient Medications   Medication Instructions   • acetaminophen (TYLENOL) 500 mg, oral, Every 6 hours PRN   • amiodarone (PACERONE) 100 mg, oral, Daily   • ezetimibe (ZETIA) 10 mg, oral, Daily   • fluticasone (Flonase) 50 mcg/actuation nasal spray 1 spray, nasal, 2 times daily   • gabapentin (NEURONTIN) 300-600 mg, oral, Daily PRN   • metoprolol succinate XL (TOPROL-XL) 50 " mg, oral, Daily   • multivitamin-min-iron-FA-vit K (Adults Multivitamin) 18 mg iron-400 mcg-25 mcg tablet 1 tablet, oral, Daily   • prasugrel (Effient) 10 mg tablet 1 tablet, oral, Daily   • rivaroxaban (XARELTO) 20 mg, oral, Daily with evening meal, Take with food.   • sertraline (ZOLOFT) 50 mg, oral, Daily   • sertraline (ZOLOFT) 100 mg, oral, Daily   • spironolactone (ALDACTONE) 12.5 mg, oral, Daily   • tiZANidine (ZANAFLEX) 4 mg, oral, Every 8 hours PRN     Review of Systems   Constitutional: Positive for malaise/fatigue.   Cardiovascular:  Positive for dyspnea on exertion.   Hematologic/Lymphatic: Bruises/bleeds easily.   All other systems reviewed and are negative.     Physical Exam:  Constitutional:       Appearance: Healthy appearance. Not in distress.   Neck:      Vascular: No JVR. JVD normal.   Pulmonary:      Effort: Pulmonary effort is normal.      Breath sounds: Normal breath sounds. No wheezing. No rhonchi. No rales.   Chest:      Chest wall: Not tender to palpatation.   Cardiovascular:      PMI at left midclavicular line. Normal rate. Regular rhythm. Normal S1. Normal S2.       Murmurs: There is no murmur.      No gallop.  No click. No rub.   Pulses:     Intact distal pulses.   Edema:     Peripheral edema absent.   Abdominal:      General: Bowel sounds are normal.      Palpations: Abdomen is soft.      Tenderness: There is no abdominal tenderness.   Musculoskeletal: Normal range of motion.         General: No tenderness. Skin:     General: Skin is warm and dry.   Neurological:      General: No focal deficit present.      Mental Status: Alert and oriented to person, place and time.          Last Labs:  CBC -  Lab Results   Component Value Date    WBC 7.7 06/25/2024    HGB 12.6 06/25/2024    HCT 37.8 06/25/2024    MCV 90 06/25/2024     06/25/2024       CMP -  Lab Results   Component Value Date    CALCIUM 9.0 06/25/2024    PHOS 5.4 (H) 11/15/2018    PROT 6.0 (L) 06/25/2024    ALBUMIN 4.3  06/25/2024    AST 19 06/25/2024    ALT 12 06/25/2024    ALKPHOS 109 06/25/2024    BILITOT 1.4 (H) 06/25/2024       LIPID PANEL -   Lab Results   Component Value Date    CHOL 175 02/05/2024    TRIG 215 (H) 02/05/2024    HDL 50.6 02/05/2024    CHHDL 3.5 02/05/2024    LDLF 100 (H) 11/10/2022    VLDL 43 (H) 02/05/2024    NHDL 124 02/05/2024       RENAL FUNCTION PANEL -   Lab Results   Component Value Date    GLUCOSE 82 06/25/2024     06/25/2024    K 4.7 06/25/2024     06/25/2024    CO2 27 06/25/2024    ANIONGAP 11 06/25/2024    BUN 12 06/25/2024    CREATININE 1.22 (H) 06/25/2024    CALCIUM 9.0 06/25/2024    PHOS 5.4 (H) 11/15/2018    ALBUMIN 4.3 06/25/2024        Lab Results   Component Value Date    HGBA1C 5.5 10/26/2018       Last Cardiology Tests:  06/14/2024 - Electrophysiology Procedure  VT ablation.    04/11/2024 - Cardiac Catheterization (LH)  1. Moderate non-obstructive coronary artery disease.  2. Patent LIMA to LAD.  3. Left Ventricular end-diastolic pressure = 12.  4. Normal LV filling pressures.    01/03/2024 - TTE  1. Left ventricular systolic function is mildly decreased with a 45% estimated ejection fraction.  2. Entire lateral wall and entire anterior wall are abnormal.  3. Spectral Doppler shows a restrictive pattern of left ventricular diastolic filling.  4. There is low normal right ventricular systolic function.  5. The left atrium is moderately dilated.  6. Moderate mitral valve regurgitation.  7. Mild to moderate tricuspid regurgitation.    03/30/2023 - Vascular Lab PVR [facility] Only   1. Right Lower PVR: No evidence of arterial occlusive disease in the right lower extremity at rest. Triphasic flow is noted in the right common femoral artery, right posterior tibial artery and right dorsalis pedis artery. No TBI on the right digit due to flat lined waveform.  2. Left Lower PVR: No evidence of arterial occlusive disease in the left lower extremity at rest. Decreased digital perfusion noted.  Triphasic flow is noted in the left common femoral artery, left posterior tibial artery and left dorsalis pedis artery.     10/11/2022 - TTE  1. Left ventricular systolic function is mildly to moderately decreased with a 40-45% estimated ejection fraction.  2. Multiple segmental abnormalities exist. See findings.  3. Spectral Doppler shows a pseudonormal pattern of left ventricular diastolic filling.  4. There is low normal right ventricular systolic function.  5. The left atrium is moderately dilated.  6. Moderate mitral valve regurgitation.  7. Moderately decreased mitral valve posterior leaflet mobility.  8. Aortic valve stenosis is not present.     04/21/2022 - Cardiac Catheterization (LH)  1. Single vessel coronary artery disease with proximal left anterior descending involvement.  2. Patent LIMA to LAD.  3. Elevated LV filling pressures.  4. Left Ventricular end-diastolic pressure = 20.     03/21/2022 - Stress Test  1. Nondiagnostic regadenoson stress secondary to paced rhythm with C/O chest tightness.  2. Medium-sized area of fixed perfusion defect of the inferolateral segment consistent with myocardial scar or hibernating myocardium in left circumflex territory. Medium-sized area of partially reversible perfusion defect of the anterior segment, consistent with mixed scar/ischemia in LAD territory. Mild left ventricular systolic dysfunction on post stress gated imaging.     09/02/2021 - TTE  1. The left ventricular systolic function is mildly decreased with a 50-55% estimated ejection fraction.  2. HYpokinetic inferolateral segment.  3. Spectral Doppler shows a restrictive pattern of left ventricular diastolic filling.  4. There is an elevated left ventricular end diastolic pressure.  5. The left atrium is moderately dilated.  6. Moderate mitral valve regurgitation.  7. There is mild to moderate tricuspid regurgitation.     01/14/2020 - [facility]   1. The left ventricular systolic function is moderately decreased  with a 35-40% estimated ejection fraction.  2. The left atrium is moderate to severely dilated.  3. The right atrium is moderately dilated.  4. There is global hypokinesis of the left ventricle with minor regional variations.  5. There is plaque visualized in the descending aorta.  6. There is plaque visualized in the transverse aorta.     12/31/2019 - TTE  1. The left ventricular systolic function is moderately decreased with a 40% estimated ejection fraction.  2. Spectral Doppler shows a pseudonormal pattern of left ventricular diastolic filling.  3. The left atrium is moderately dilated.  4. The right atrium is moderately dilated.  5. Moderately severe mitral valve regurgitation.  6. There is mild to moderate tricuspid regurgitation.     11/06/2018 - Cardiac Catheterization (LH)  1. Severe ostial LAD disease.  2. Patent LIMA-LAD.  3. Moderate diffuse proximal RCA disease with negative FFR.  4. Management of HF/VT per cardiology and CT surgery teams.     10/31/2018 - TTE  1. The left ventricular systolic function is moderately decreased with a 30-35% estimated ejection fraction.  2. Multiple segmental abnormalities exist. See findings.  3. Abnormal septal motion consistent with post-operative status and abnormal septal motion consistent with RV pacemaker.  4. Echocontrast given for LV opacification. Assessment of LV systolic function difficult due to pacing with abnormal septal motion as well as frequent PVCs with abnormal activation and variable RR intervals. There appears to be akinesis in the basal RCA territory and hypokinesis of the apex.  5. The left atrium is mild to moderately dilated.  6. There is a moderate pleural effusion.     10/25/2018 - Cardiac Catheterization (LH)  1. Severe ostial LAD stenosis.  2. Patent LM/LCx stent and patent proximal LAD stent.     04/19/2018 - Cardiac Catheterization  1. Double vessel coronary artery disease with proximal left anterior descending involvement.  2. Successful  PTCA/stent of LAD and Cx.     Diagnostic review: I have personally reviewed the result(s) of the CTA Abdomen/Pelvis.    Assessment/Plan  1) Mitral and Tricuspid Regurgitation  [facility] Jan 2020 with mild mitral regurgitation  TTE Oct 2022 with moderate MR  TTE 01/03/2024 with moderate mitral regurgitation, mild to moderate tricuspid regurgitation     2) CAD s/p CABG Oct 2018 with Ischemic CMP/Chronic Systolic Heart Failure   On metoprolol succinate 50 mg daily, Xarelto 20 mg daily, spironolactone 12.5 mg daily, Prasugrel 10 mg daily  Not on ACEi/ARB/ARNI d/t hypotension and dizziness on lisinopril  [facility] April 2022 with patent LIMA to LAD  TTE Oct 2022 with LVEF 40-45%  Has ICD - follows with Dr. Macias (EP)  Well compensated on exam  TTE 01/03/2024 with LVEF 45%, low normal RV systolic function, moderate mitral regurgitation, mild to moderate tricuspid regurgitation  Seen by Dr. Macias 03/25/2024 - patient reports chest pressure, device interrogation showed normal device function with high burden of BiV pacing with evidence of multiple runs of slow sustained VT, recommendations made for VT ablation with lat scar modification, recommendations for ischemic workup with [facility] s/t concern re: LAD territory ischemia.   [facility] 04/11/2024 with moderate non-obstructive CAD, patent LIMA to LAD.  Denies CP or discomfort  Reports exertional SOB  Discontinue Prasugrel s/t easy bruising   Continue all other medications as prescribed   Check HR CT chest to r/o amiodarone lung toxicity   Check PFTs   Followup with Demetra Duenas NP, in 3 months    3) Hyperlipidemia  Goal LDL <70  On Zetia 10 mg daily  Intolerant of statins s/t diarrhea   Educated regarding Mediterranean style of eating - handout given   Lipid panel 02/05/2024 with LDL of 81; not at goal  Continue current medical Rx  Followup with Demetra Duenas NP, in 3 months    4) VT s/p VT ablation 01/2019 and BIVICD, s/p Repeat VT Ablation 06/14/2024  On amiodarone 100 mg daily, metoprolol  succinate 50 mg daily   Follows with EP - Dr. Macias  Seen by Dr. Macias 03/25/2024 - patient reports chest pressure, device interrogation showed normal device function with high burden of BiV pacing with evidence of multiple runs of slow sustained VT, recommendations made for VT ablation with lat scar modification, recommendations for ischemic workup with [facility] s/t concern re: LAD territory ischemia.   S/P VT ablation 06/14/2024  CTA abdomen/pelvis 06/26/2024 with non-flow-limiting dissection plane extending from the distal right external iliac artery through the right common femoral artery, and cholelithiasis without cholecystitis.   Dizziness/lightheadedness resolved  Reports persistent, although improved fatigue   Reports exertional SOB  Check HR CT chest to r/o amiodarone lung toxicity   Check PFTs   Followup with Demetra Duenas NP, in 3 months     5) Right Neck Lesion with h/o Melanoma  Followed by dermatology     6) Claudication, Discoloration to Toes - Chilblains   Patient called our office on 03/23/2023 to report discoloration to toes and claudication  [facility] 03/30/2023 negative for arterial occlusive disease bilaterally  Evaluated by Dr. Sanchez on 05/08/2023 - diagnosed with Chilblains       Scribe Attestation  By signing my name below, I, Mili Toth   attest that this documentation has been prepared under the direction and in the presence of Liban Campa MD.

## 2024-07-11 NOTE — PATIENT INSTRUCTIONS
Discontinue Prasugrel.   Continue all other medications as prescribed.  Dr. Campa has ordered a CT scan of your chest. You will be notified of the results once they become available.    Dr. Campa has also ordered lung function studies. You will be notified of the results once they become available.    Followup with Demetra Duenas NP, in 3 months.    If you have any questions or cardiac concerns, please call our office at [phone].

## 2024-07-22 ENCOUNTER — OFFICE VISIT (OUTPATIENT)
Dept: CARDIOLOGY | Facility: HOSPITAL | Age: 69
End: 2024-07-22
Payer: MEDICARE

## 2024-07-22 VITALS
DIASTOLIC BLOOD PRESSURE: 64 MMHG | OXYGEN SATURATION: 100 % | HEIGHT: 61 IN | SYSTOLIC BLOOD PRESSURE: 103 MMHG | WEIGHT: 138.2 LBS | BODY MASS INDEX: 26.09 KG/M2 | HEART RATE: 86 BPM

## 2024-07-22 DIAGNOSIS — I47.29 PAROXYSMAL VENTRICULAR TACHYCARDIA (MULTI): ICD-10-CM

## 2024-07-22 DIAGNOSIS — Z95.810 CARDIAC RESYNCHRONIZATION THERAPY DEFIBRILLATOR (CRT-D) IN PLACE: ICD-10-CM

## 2024-07-22 DIAGNOSIS — I47.29 VENTRICULAR TACHYCARDIA (PAROXYSMAL) (MULTI): ICD-10-CM

## 2024-07-22 DIAGNOSIS — I47.20 VENTRICULAR TACHYCARDIA (MULTI): Primary | ICD-10-CM

## 2024-07-22 LAB
ATRIAL RATE: 86 BPM
P AXIS: 37 DEGREES
P OFFSET: 159 MS
P ONSET: 131 MS
PR INTERVAL: 158 MS
Q ONSET: 182 MS
QRS COUNT: 14 BEATS
QRS DURATION: 180 MS
QT INTERVAL: 466 MS
QTC CALCULATION(BAZETT): 557 MS
QTC FREDERICIA: 525 MS
R AXIS: -85 DEGREES
T AXIS: 99 DEGREES
T OFFSET: 415 MS
VENTRICULAR RATE: 86 BPM

## 2024-07-22 PROCEDURE — 3008F BODY MASS INDEX DOCD: CPT | Performed by: INTERNAL MEDICINE

## 2024-07-22 PROCEDURE — 3074F SYST BP LT 130 MM HG: CPT | Performed by: INTERNAL MEDICINE

## 2024-07-22 PROCEDURE — 99214 OFFICE O/P EST MOD 30 MIN: CPT | Performed by: INTERNAL MEDICINE

## 2024-07-22 PROCEDURE — 93005 ELECTROCARDIOGRAM TRACING: CPT | Performed by: INTERNAL MEDICINE

## 2024-07-22 PROCEDURE — 1123F ACP DISCUSS/DSCN MKR DOCD: CPT | Performed by: INTERNAL MEDICINE

## 2024-07-22 PROCEDURE — 1159F MED LIST DOCD IN RCRD: CPT | Performed by: INTERNAL MEDICINE

## 2024-07-22 PROCEDURE — 3078F DIAST BP <80 MM HG: CPT | Performed by: INTERNAL MEDICINE

## 2024-07-22 PROCEDURE — 1036F TOBACCO NON-USER: CPT | Performed by: INTERNAL MEDICINE

## 2024-07-22 RX ORDER — AMIODARONE HYDROCHLORIDE 100 MG/1
100 TABLET ORAL DAILY
Qty: 90 TABLET | Refills: 3 | Status: SHIPPED | OUTPATIENT
Start: 2024-07-22 | End: 2025-07-22

## 2024-07-22 ASSESSMENT — ENCOUNTER SYMPTOMS
OCCASIONAL FEELINGS OF UNSTEADINESS: 0
LOSS OF SENSATION IN FEET: 0
DEPRESSION: 0

## 2024-07-22 NOTE — PROGRESS NOTES
Referred by Manuel Gibson MD provider found for   Chief Complaint   Patient presents with    Cardiomyopathy     S/P VT RFA         Shani Watson is a 68 y.o. year old female patient with h/o ICM and recurrent VT. S/p VT ablation 6 weeks ago. Presents for follow up.      PMHx/PSHx: As above    FamHx: unremarkable     Allergies:  Allergies   Allergen Reactions    Bactrim [Sulfamethoxazole-Trimethoprim] Rash    Erythromycin Other    Amoxicillin GI Upset    Milk GI Upset    Statins-Hmg-Coa Reductase Inhibitors Diarrhea    Valacyclovir Other     Ha    Codeine Anxiety and Other     Reaction from Community: Other(See Desc)  Mood alteration    Diazepam Anxiety    Latex Rash    Meperidine Other and Rash     Reaction from Community: Other(See Desc) mood alteration    Penicillins GI Upset    Sulfa (Sulfonamide Antibiotics) Other and Rash     rash        Review of Systems    Constitutional: not feeling tired.   Eyes: no eyesight problems.   ENT: no hearing loss and no nosebleeds.   Cardiovascular: no intermittent leg claudication and as noted in HPI.   Respiratory: no chronic cough and no shortness of breath.   Gastrointestinal: no change in bowel habits and no blood in stools.   Genitourinary: no urinary frequency and no hematuria.   Skin: no skin rashes.   Neurological: no seizures and no frequent falls.   Psychiatric: no depression and not suicidal.   All other systems have been reviewed and are negative for complaint.     Outpatient Medications:  Current Outpatient Medications   Medication Instructions    acetaminophen (TYLENOL) 500 mg, oral, Every 6 hours PRN    amiodarone (PACERONE) 100 mg, oral, Daily    ezetimibe (ZETIA) 10 mg, oral, Daily    fluticasone (Flonase) 50 mcg/actuation nasal spray 1 spray, nasal, 2 times daily    gabapentin (NEURONTIN) 300-600 mg, oral, Daily PRN    metoprolol succinate XL (TOPROL-XL) 50 mg, oral, Daily    multivitamin-min-iron-FA-vit K (Adults Multivitamin) 18 mg iron-400 mcg-25 mcg  "tablet 1 tablet, oral, Daily    rivaroxaban (XARELTO) 20 mg, oral, Daily with evening meal, Take with food.    sertraline (ZOLOFT) 50 mg, oral, Daily    sertraline (ZOLOFT) 100 mg, oral, Daily    spironolactone (ALDACTONE) 12.5 mg, oral, Daily    tiZANidine (ZANAFLEX) 4 mg, oral, Every 8 hours PRN         Last Recorded Vitals:      6/14/2024     6:40 PM 6/15/2024    12:02 AM 6/15/2024     3:46 AM 6/15/2024     7:05 AM 6/15/2024    11:25 AM 6/25/2024    11:49 AM 7/11/2024     3:41 PM   Vitals   Systolic 131 122 134 125 138 98 114   Diastolic 85 91 82 85 85 70 78   Heart Rate 62 64 64 65 66 85 80   Temp 36.2 °C (97.2 °F) 36.2 °C (97.2 °F) 36.5 °C (97.7 °F) 36.3 °C (97.3 °F) 36.2 °C (97.2 °F)     Resp 17 17 16 18 18     Height (in)      1.549 m (5' 1\") 1.549 m (5' 1\")   Weight (lb)      139 138   BMI      26.26 kg/m2 26.07 kg/m2   BSA (m2)      1.65 m2 1.64 m2   Visit Report      Report Report    Visit Vitals  OB Status Postmenopausal   Smoking Status Never        Physical Exam:  Constitutional: alert and in no acute distress.   Eyes: no erythema, swelling or discharge from the eye .   Neck: neck is supple, symmetric, trachea midline, no masses  and no thyromegaly .   Pulmonary: no increased work of breathing or signs of respiratory distress  and lungs clear to auscultation.    Cardiovascular: carotid pulses 2+ bilaterally with no bruit , JVP was normal, no thrills , regular rhythm, normal S1 and S2, no murmurs , pedal pulses 2+ bilaterally  and no edema .   Abdomen: abdomen non-tender, no masses  and no hepatomegaly .   Skin: skin warm and dry, normal skin turgor .   Psychiatric judgment and insight is normal  and oriented to person, place and time .        Assessment/Plan   Problem List Items Addressed This Visit             ICD-10-CM    Ventricular tachycardia (Multi) - Primary I47.20    Relevant Orders    ECG 12 lead (Clinic Performed)       Shani SHANIQUE Walter is a 68 y.o. year old female patient with h/o ICM and " recurrent VT. S/p VT ablation 6 weeks ago. Presents for follow up.    The patient had a rough recovery from the procedure although feeling much better now. Device interrogation a few days after the procedure showed no VT and shew is scheduled for device interrogation in August. Her current ECG shows AV paced rhythm (BiV pacing), no ectopy, HR 86 bpm. Will stop anticoagulation and continue the rest of medications. Follow up in 2 months.          Manuel Macias MD  Cardiac Electrophysiology      Thank you very much for allowing me to participate in the care of this pleasant patient. Please do not hesitate to contact me with any further questions or concerns regarding his care.    **Disclaimer: This note was dictated by speech recognition, and every effort has been made to prevent any error in transcription, however minor errors may be present**

## 2024-08-05 ENCOUNTER — APPOINTMENT (OUTPATIENT)
Dept: RADIOLOGY | Facility: HOSPITAL | Age: 69
End: 2024-08-05
Payer: MEDICARE

## 2024-08-05 ENCOUNTER — APPOINTMENT (OUTPATIENT)
Dept: RESPIRATORY THERAPY | Facility: HOSPITAL | Age: 69
End: 2024-08-05
Payer: MEDICARE

## 2024-08-06 ENCOUNTER — APPOINTMENT (OUTPATIENT)
Dept: PRIMARY CARE | Facility: CLINIC | Age: 69
End: 2024-08-06
Payer: MEDICARE

## 2024-08-06 ENCOUNTER — LAB (OUTPATIENT)
Dept: LAB | Facility: LAB | Age: 69
End: 2024-08-06
Payer: MEDICARE

## 2024-08-06 ENCOUNTER — TELEPHONE (OUTPATIENT)
Dept: PRIMARY CARE | Facility: CLINIC | Age: 69
End: 2024-08-06

## 2024-08-06 VITALS
BODY MASS INDEX: 26.3 KG/M2 | HEART RATE: 60 BPM | DIASTOLIC BLOOD PRESSURE: 58 MMHG | WEIGHT: 139.2 LBS | TEMPERATURE: 96.7 F | SYSTOLIC BLOOD PRESSURE: 100 MMHG

## 2024-08-06 DIAGNOSIS — R53.83 OTHER FATIGUE: ICD-10-CM

## 2024-08-06 DIAGNOSIS — Z79.899 LONG TERM CURRENT USE OF AMIODARONE: ICD-10-CM

## 2024-08-06 DIAGNOSIS — Z12.31 SCREENING MAMMOGRAM FOR BREAST CANCER: ICD-10-CM

## 2024-08-06 DIAGNOSIS — E78.00 PURE HYPERCHOLESTEROLEMIA: ICD-10-CM

## 2024-08-06 DIAGNOSIS — G89.29 CHRONIC BILATERAL LOW BACK PAIN WITH BILATERAL SCIATICA: ICD-10-CM

## 2024-08-06 DIAGNOSIS — M54.41 CHRONIC BILATERAL LOW BACK PAIN WITH BILATERAL SCIATICA: ICD-10-CM

## 2024-08-06 DIAGNOSIS — Z95.0 PRESENCE OF CARDIAC PACEMAKER: ICD-10-CM

## 2024-08-06 DIAGNOSIS — M54.42 CHRONIC BILATERAL LOW BACK PAIN WITH BILATERAL SCIATICA: ICD-10-CM

## 2024-08-06 DIAGNOSIS — R06.09 DYSPNEA ON EXERTION: ICD-10-CM

## 2024-08-06 DIAGNOSIS — I50.22 CHRONIC SYSTOLIC HEART FAILURE (MULTI): ICD-10-CM

## 2024-08-06 DIAGNOSIS — G47.33 OBSTRUCTIVE SLEEP APNEA: ICD-10-CM

## 2024-08-06 DIAGNOSIS — I47.29 VENTRICULAR TACHYCARDIA (PAROXYSMAL) (MULTI): ICD-10-CM

## 2024-08-06 DIAGNOSIS — I25.10 CAD IN NATIVE ARTERY: ICD-10-CM

## 2024-08-06 DIAGNOSIS — F41.8 MIXED ANXIETY DEPRESSIVE DISORDER: Primary | ICD-10-CM

## 2024-08-06 LAB — TSH SERPL-ACNC: 2.92 MIU/L (ref 0.44–3.98)

## 2024-08-06 PROCEDURE — 1123F ACP DISCUSS/DSCN MKR DOCD: CPT | Performed by: INTERNAL MEDICINE

## 2024-08-06 PROCEDURE — 3074F SYST BP LT 130 MM HG: CPT | Performed by: INTERNAL MEDICINE

## 2024-08-06 PROCEDURE — 36415 COLL VENOUS BLD VENIPUNCTURE: CPT

## 2024-08-06 PROCEDURE — G2211 COMPLEX E/M VISIT ADD ON: HCPCS | Performed by: INTERNAL MEDICINE

## 2024-08-06 PROCEDURE — 1159F MED LIST DOCD IN RCRD: CPT | Performed by: INTERNAL MEDICINE

## 2024-08-06 PROCEDURE — 84443 ASSAY THYROID STIM HORMONE: CPT

## 2024-08-06 PROCEDURE — 3078F DIAST BP <80 MM HG: CPT | Performed by: INTERNAL MEDICINE

## 2024-08-06 PROCEDURE — 99214 OFFICE O/P EST MOD 30 MIN: CPT | Performed by: INTERNAL MEDICINE

## 2024-08-06 RX ORDER — GABAPENTIN 300 MG/1
300-600 CAPSULE ORAL DAILY PRN
Qty: 180 CAPSULE | Refills: 1 | Status: SHIPPED | OUTPATIENT
Start: 2024-08-06

## 2024-08-06 RX ORDER — SERTRALINE HYDROCHLORIDE 50 MG/1
50 TABLET, FILM COATED ORAL DAILY
Qty: 90 TABLET | Refills: 1 | Status: SHIPPED | OUTPATIENT
Start: 2024-08-06

## 2024-08-06 RX ORDER — SERTRALINE HYDROCHLORIDE 100 MG/1
100 TABLET, FILM COATED ORAL DAILY
Qty: 90 TABLET | Refills: 1 | Status: SHIPPED | OUTPATIENT
Start: 2024-08-06

## 2024-08-06 ASSESSMENT — ENCOUNTER SYMPTOMS
HEMATOLOGIC/LYMPHATIC NEGATIVE: 1
NEUROLOGICAL NEGATIVE: 1
SHORTNESS OF BREATH: 1
PSYCHIATRIC NEGATIVE: 1
ENDOCRINE NEGATIVE: 1
MUSCULOSKELETAL NEGATIVE: 1
CARDIOVASCULAR NEGATIVE: 1
EYES NEGATIVE: 1
CONSTITUTIONAL NEGATIVE: 1
GASTROINTESTINAL NEGATIVE: 1
ALLERGIC/IMMUNOLOGIC NEGATIVE: 1

## 2024-08-06 NOTE — PROGRESS NOTES
Subjective   Patient ID: FAYE Watson is a 68 y.o. female who presents for 6 month follow up .  Patient presents today in follow up re:   mixed anxiety-depressive disorder.    Anxiety symptoms have been overall well controlled with current medication therapy.  No adverse effects of medication reported.  No new or associated issues reported.    Depressive condition has been well controlled with current medication therapy.  Patient denies side effects of medication.  Denies any exacerbation of symptoms other than with increased stress.  No other associated complaints voiced today.         Review of Systems   Constitutional: Negative.    HENT: Negative.     Eyes: Negative.    Respiratory:  Positive for shortness of breath.    Cardiovascular: Negative.    Gastrointestinal: Negative.    Endocrine: Negative.    Genitourinary: Negative.    Musculoskeletal: Negative.    Skin: Negative.    Allergic/Immunologic: Negative.    Neurological: Negative.    Hematological: Negative.    Psychiatric/Behavioral: Negative.         Objective     Blood pressure 100/58, pulse 60, temperature 35.9 °C (96.7 °F), temperature source Temporal, weight 63.1 kg (139 lb 3.2 oz).   Physical Exam  Vitals reviewed.   Constitutional:       Appearance: Normal appearance.   Neck:      Vascular: No carotid bruit.   Cardiovascular:      Rate and Rhythm: Normal rate and regular rhythm.      Pulses: Normal pulses.      Heart sounds: Normal heart sounds. No murmur heard.  Pulmonary:      Effort: Pulmonary effort is normal.      Breath sounds: Normal breath sounds. No wheezing or rales.   Abdominal:      General: Abdomen is flat. There is no distension.      Palpations: Abdomen is soft.      Tenderness: There is no abdominal tenderness.   Musculoskeletal:         General: Normal range of motion.      Cervical back: Normal range of motion and neck supple.   Skin:     General: Skin is warm and dry.   Neurological:      General: No focal deficit present.       Mental Status: She is alert and oriented to person, place, and time.   Psychiatric:         Mood and Affect: Mood normal.         Behavior: Behavior normal.         Assessment/Plan   Problem List Items Addressed This Visit       CAD in native artery    Hyperlipidemia    Relevant Orders    Comprehensive Metabolic Panel    Lipid Panel    Mixed anxiety depressive disorder - Primary    Relevant Medications    sertraline (Zoloft) 50 mg tablet    sertraline (Zoloft) 100 mg tablet    Obstructive sleep apnea    Presence of cardiac pacemaker    Ventricular tachycardia (paroxysmal) (Multi)    Chronic systolic heart failure (Multi)    Chronic bilateral low back pain with bilateral sciatica    Relevant Medications    gabapentin (Neurontin) 300 mg capsule    Fatigue    Relevant Orders    TSH with reflex to Free T4 if abnormal    Dyspnea on exertion     Other Visit Diagnoses       Screening mammogram for breast cancer        Relevant Orders    BI mammo bilateral screening tomosynthesis    Long term current use of amiodarone        Relevant Orders    TSH with reflex to Free T4 if abnormal            Anxiety symptoms well controlled and doing well on current therapy.  Will continue present therapy and follow clinically.  Depressive condition well compensated on current medication.  Will continue present therapy and follow clinically.  Back pain overall well compensated on current therapy.  Will continue present therapy and follow.  She questions applying for disability  She has c/o difficulty with sleep.  She does not use CPAP as prescribed.  She states she awakens frequently and can't get back to sleep.  Subsequently, she feels fatigued during the day.  She states this is an intermittent issue that will come on for a week or so then ronen for awhile.  She continues follow up with Cardiology and Vascular services for CV issues.  It is noted that she was to be started on Nexletol therapy remotely per Cardio visit but she states it  was much too expensive.  I assured her that this is fine as her LDL is <100 anyway which is the ultimate study proven goal.    It is also noted that she recently underwent ablation procedure and is doing well.  She complains of exertional dyspnea chronically which has not improved after ablation to arrest frequent episodes of VT.  It is noted that Cardio has ordered PFT though she has no history of lung disease or smoking.  I suspect her dyspnea is cardiac related with lower than normal EF and relative bradycardia on beta blocker therapy.  Will check thyroid function in view of Sx and amiodarone therapy.  Mammogram due in September.  Order entered.  Most recent lab values are reviewed with all parameters either at goal or WNL.  It is also noted that the lesion on the left arm that I saw in June was a skin cancer, cell type unknown to patient, that has been widely excised with clear margins by Derm.    Her major concern continues to be regarding her financial and living status.       Follow up in 6 months  Lab to be drawn 1 week prior to next office visit.

## 2024-08-07 ENCOUNTER — TELEPHONE (OUTPATIENT)
Dept: PRIMARY CARE | Facility: CLINIC | Age: 69
End: 2024-08-07
Payer: MEDICARE

## 2024-08-07 DIAGNOSIS — I47.29 VENTRICULAR TACHYCARDIA (PAROXYSMAL) (MULTI): ICD-10-CM

## 2024-08-07 RX ORDER — METOPROLOL SUCCINATE 25 MG/1
25 TABLET, EXTENDED RELEASE ORAL DAILY
Qty: 90 TABLET | Refills: 1 | Status: SHIPPED | OUTPATIENT
Start: 2024-08-07 | End: 2025-02-03

## 2024-08-07 NOTE — TELEPHONE ENCOUNTER
Patient called in to report that Dr. Campa changed her dosage on her Metroprolol Dr. Campa changed the dosage from 50mg to 25mg.

## 2024-08-22 ENCOUNTER — HOSPITAL ENCOUNTER (OUTPATIENT)
Dept: RADIOLOGY | Facility: HOSPITAL | Age: 69
End: 2024-08-22
Payer: MEDICARE

## 2024-08-22 ENCOUNTER — APPOINTMENT (OUTPATIENT)
Dept: CARDIOLOGY | Facility: HOSPITAL | Age: 69
DRG: 309 | End: 2024-08-22
Payer: MEDICARE

## 2024-08-22 ENCOUNTER — HOSPITAL ENCOUNTER (INPATIENT)
Facility: HOSPITAL | Age: 69
DRG: 291 | End: 2024-08-22
Attending: STUDENT IN AN ORGANIZED HEALTH CARE EDUCATION/TRAINING PROGRAM | Admitting: INTERNAL MEDICINE
Payer: MEDICARE

## 2024-08-22 ENCOUNTER — APPOINTMENT (OUTPATIENT)
Dept: RESPIRATORY THERAPY | Facility: HOSPITAL | Age: 69
End: 2024-08-22
Payer: MEDICARE

## 2024-08-22 ENCOUNTER — APPOINTMENT (OUTPATIENT)
Dept: RADIOLOGY | Facility: HOSPITAL | Age: 69
DRG: 309 | End: 2024-08-22
Payer: MEDICARE

## 2024-08-22 DIAGNOSIS — I47.20 V-TACH (MULTI): ICD-10-CM

## 2024-08-22 DIAGNOSIS — I10 BENIGN ESSENTIAL HYPERTENSION: ICD-10-CM

## 2024-08-22 DIAGNOSIS — I25.5 ISCHEMIC CARDIOMYOPATHY: ICD-10-CM

## 2024-08-22 DIAGNOSIS — I25.708 ATHEROSCLEROSIS OF CORONARY ARTERY BYPASS GRAFT OF NATIVE HEART WITH OTHER FORMS OF ANGINA PECTORIS (CMS-HCC): ICD-10-CM

## 2024-08-22 DIAGNOSIS — I50.22 CHRONIC SYSTOLIC HEART FAILURE (MULTI): ICD-10-CM

## 2024-08-22 DIAGNOSIS — Z95.1 S/P CABG X 1: ICD-10-CM

## 2024-08-22 DIAGNOSIS — I47.20 VENTRICULAR TACHYCARDIA (MULTI): Primary | ICD-10-CM

## 2024-08-22 DIAGNOSIS — Z95.810 CARDIAC RESYNCHRONIZATION THERAPY DEFIBRILLATOR (CRT-D) IN PLACE: ICD-10-CM

## 2024-08-22 PROBLEM — R73.09 BLOOD GLUCOSE ABNORMAL: Status: ACTIVE | Noted: 2024-08-22

## 2024-08-22 LAB
ALBUMIN SERPL BCP-MCNC: 4.2 G/DL (ref 3.4–5)
ALP SERPL-CCNC: 92 U/L (ref 33–136)
ALT SERPL W P-5'-P-CCNC: 14 U/L (ref 7–45)
ANION GAP SERPL CALC-SCNC: 10 MMOL/L (ref 10–20)
AORTIC VALVE MEAN GRADIENT: 5 MMHG
AORTIC VALVE PEAK VELOCITY: 1.42 M/S
AST SERPL W P-5'-P-CCNC: 25 U/L (ref 9–39)
AV PEAK GRADIENT: 8.1 MMHG
AVA (PEAK VEL): 1.81 CM2
AVA (VTI): 1.56 CM2
BASOPHILS # BLD AUTO: 0.04 X10*3/UL (ref 0–0.1)
BASOPHILS NFR BLD AUTO: 0.7 %
BILIRUB SERPL-MCNC: 1.8 MG/DL (ref 0–1.2)
BNP SERPL-MCNC: 876 PG/ML (ref 0–99)
BUN SERPL-MCNC: 12 MG/DL (ref 6–23)
CALCIUM SERPL-MCNC: 8.6 MG/DL (ref 8.6–10.3)
CARDIAC TROPONIN I PNL SERPL HS: 11 NG/L (ref 0–13)
CARDIAC TROPONIN I PNL SERPL HS: 13 NG/L (ref 0–13)
CHLORIDE SERPL-SCNC: 102 MMOL/L (ref 98–107)
CO2 SERPL-SCNC: 25 MMOL/L (ref 21–32)
CREAT SERPL-MCNC: 1.12 MG/DL (ref 0.5–1.05)
EGFRCR SERPLBLD CKD-EPI 2021: 53 ML/MIN/1.73M*2
EJECTION FRACTION APICAL 4 CHAMBER: 39.5
EJECTION FRACTION: 38 %
EOSINOPHIL # BLD AUTO: 0.2 X10*3/UL (ref 0–0.7)
EOSINOPHIL NFR BLD AUTO: 3.7 %
ERYTHROCYTE [DISTWIDTH] IN BLOOD BY AUTOMATED COUNT: 13.7 % (ref 11.5–14.5)
GLOBAL LONGITUDINAL STRAIN: 5.7 %
GLUCOSE SERPL-MCNC: 174 MG/DL (ref 74–99)
HCT VFR BLD AUTO: 36.2 % (ref 36–46)
HGB BLD-MCNC: 12 G/DL (ref 12–16)
IMM GRANULOCYTES # BLD AUTO: 0.02 X10*3/UL (ref 0–0.7)
IMM GRANULOCYTES NFR BLD AUTO: 0.4 % (ref 0–0.9)
LEFT ATRIUM VOLUME AREA LENGTH INDEX BSA: 51.9 ML/M2
LEFT VENTRICLE INTERNAL DIMENSION DIASTOLE: 4.7 CM (ref 3.5–6)
LEFT VENTRICULAR OUTFLOW TRACT DIAMETER: 1.8 CM
LV EJECTION FRACTION BIPLANE: 39 %
LYMPHOCYTES # BLD AUTO: 0.55 X10*3/UL (ref 1.2–4.8)
LYMPHOCYTES NFR BLD AUTO: 10.1 %
MAGNESIUM SERPL-MCNC: 1.85 MG/DL (ref 1.6–2.4)
MCH RBC QN AUTO: 29 PG (ref 26–34)
MCHC RBC AUTO-ENTMCNC: 33.1 G/DL (ref 32–36)
MCV RBC AUTO: 87 FL (ref 80–100)
MITRAL VALVE E/A RATIO: 5.71
MONOCYTES # BLD AUTO: 0.29 X10*3/UL (ref 0.1–1)
MONOCYTES NFR BLD AUTO: 5.3 %
NEUTROPHILS # BLD AUTO: 4.33 X10*3/UL (ref 1.2–7.7)
NEUTROPHILS NFR BLD AUTO: 79.8 %
NRBC BLD-RTO: 0 /100 WBCS (ref 0–0)
PLATELET # BLD AUTO: 186 X10*3/UL (ref 150–450)
POTASSIUM SERPL-SCNC: 4 MMOL/L (ref 3.5–5.3)
PROT SERPL-MCNC: 6.2 G/DL (ref 6.4–8.2)
RBC # BLD AUTO: 4.14 X10*6/UL (ref 4–5.2)
RIGHT VENTRICLE FREE WALL PEAK S': 11.7 CM/S
RIGHT VENTRICLE PEAK SYSTOLIC PRESSURE: 64.2 MMHG
SODIUM SERPL-SCNC: 133 MMOL/L (ref 136–145)
TRICUSPID ANNULAR PLANE SYSTOLIC EXCURSION: 1.7 CM
WBC # BLD AUTO: 5.4 X10*3/UL (ref 4.4–11.3)

## 2024-08-22 PROCEDURE — 2500000004 HC RX 250 GENERAL PHARMACY W/ HCPCS (ALT 636 FOR OP/ED): Performed by: NURSE PRACTITIONER

## 2024-08-22 PROCEDURE — 2500000002 HC RX 250 W HCPCS SELF ADMINISTERED DRUGS (ALT 637 FOR MEDICARE OP, ALT 636 FOR OP/ED): Performed by: NURSE PRACTITIONER

## 2024-08-22 PROCEDURE — 85025 COMPLETE CBC W/AUTO DIFF WBC: CPT | Performed by: STUDENT IN AN ORGANIZED HEALTH CARE EDUCATION/TRAINING PROGRAM

## 2024-08-22 PROCEDURE — 36415 COLL VENOUS BLD VENIPUNCTURE: CPT | Performed by: STUDENT IN AN ORGANIZED HEALTH CARE EDUCATION/TRAINING PROGRAM

## 2024-08-22 PROCEDURE — G0378 HOSPITAL OBSERVATION PER HR: HCPCS

## 2024-08-22 PROCEDURE — 80053 COMPREHEN METABOLIC PANEL: CPT | Performed by: STUDENT IN AN ORGANIZED HEALTH CARE EDUCATION/TRAINING PROGRAM

## 2024-08-22 PROCEDURE — 93306 TTE W/DOPPLER COMPLETE: CPT

## 2024-08-22 PROCEDURE — 99285 EMERGENCY DEPT VISIT HI MDM: CPT | Mod: 25

## 2024-08-22 PROCEDURE — 93306 TTE W/DOPPLER COMPLETE: CPT | Performed by: INTERNAL MEDICINE

## 2024-08-22 PROCEDURE — 2500000001 HC RX 250 WO HCPCS SELF ADMINISTERED DRUGS (ALT 637 FOR MEDICARE OP): Performed by: NURSE PRACTITIONER

## 2024-08-22 PROCEDURE — 99223 1ST HOSP IP/OBS HIGH 75: CPT | Performed by: INTERNAL MEDICINE

## 2024-08-22 PROCEDURE — 84484 ASSAY OF TROPONIN QUANT: CPT | Performed by: STUDENT IN AN ORGANIZED HEALTH CARE EDUCATION/TRAINING PROGRAM

## 2024-08-22 PROCEDURE — 83735 ASSAY OF MAGNESIUM: CPT | Performed by: STUDENT IN AN ORGANIZED HEALTH CARE EDUCATION/TRAINING PROGRAM

## 2024-08-22 PROCEDURE — 71045 X-RAY EXAM CHEST 1 VIEW: CPT

## 2024-08-22 PROCEDURE — 96374 THER/PROPH/DIAG INJ IV PUSH: CPT

## 2024-08-22 PROCEDURE — 99223 1ST HOSP IP/OBS HIGH 75: CPT | Performed by: NURSE PRACTITIONER

## 2024-08-22 PROCEDURE — 2500000001 HC RX 250 WO HCPCS SELF ADMINISTERED DRUGS (ALT 637 FOR MEDICARE OP): Performed by: INTERNAL MEDICINE

## 2024-08-22 PROCEDURE — 83036 HEMOGLOBIN GLYCOSYLATED A1C: CPT | Performed by: NURSE PRACTITIONER

## 2024-08-22 PROCEDURE — 2500000004 HC RX 250 GENERAL PHARMACY W/ HCPCS (ALT 636 FOR OP/ED): Performed by: STUDENT IN AN ORGANIZED HEALTH CARE EDUCATION/TRAINING PROGRAM

## 2024-08-22 PROCEDURE — 83880 ASSAY OF NATRIURETIC PEPTIDE: CPT | Performed by: STUDENT IN AN ORGANIZED HEALTH CARE EDUCATION/TRAINING PROGRAM

## 2024-08-22 PROCEDURE — 71045 X-RAY EXAM CHEST 1 VIEW: CPT | Performed by: RADIOLOGY

## 2024-08-22 RX ORDER — EZETIMIBE 10 MG/1
10 TABLET ORAL DAILY
Status: DISCONTINUED | OUTPATIENT
Start: 2024-08-22 | End: 2024-08-27 | Stop reason: HOSPADM

## 2024-08-22 RX ORDER — LISINOPRIL 2.5 MG/1
2.5 TABLET ORAL DAILY
Status: DISCONTINUED | OUTPATIENT
Start: 2024-08-22 | End: 2024-08-27 | Stop reason: HOSPADM

## 2024-08-22 RX ORDER — METOPROLOL SUCCINATE 25 MG/1
25 TABLET, EXTENDED RELEASE ORAL DAILY
Status: DISCONTINUED | OUTPATIENT
Start: 2024-08-22 | End: 2024-08-23

## 2024-08-22 RX ORDER — GABAPENTIN 300 MG/1
300 CAPSULE ORAL DAILY PRN
Status: DISCONTINUED | OUTPATIENT
Start: 2024-08-22 | End: 2024-08-27 | Stop reason: HOSPADM

## 2024-08-22 RX ORDER — ONDANSETRON HYDROCHLORIDE 2 MG/ML
4 INJECTION, SOLUTION INTRAVENOUS EVERY 8 HOURS PRN
Status: DISCONTINUED | OUTPATIENT
Start: 2024-08-22 | End: 2024-08-27 | Stop reason: HOSPADM

## 2024-08-22 RX ORDER — AMIODARONE HYDROCHLORIDE 200 MG/1
100 TABLET ORAL DAILY
Status: DISCONTINUED | OUTPATIENT
Start: 2024-08-22 | End: 2024-08-23

## 2024-08-22 RX ORDER — SPIRONOLACTONE 25 MG/1
12.5 TABLET ORAL DAILY
Status: DISCONTINUED | OUTPATIENT
Start: 2024-08-22 | End: 2024-08-27 | Stop reason: HOSPADM

## 2024-08-22 RX ORDER — POLYETHYLENE GLYCOL 3350 17 G/17G
17 POWDER, FOR SOLUTION ORAL DAILY
Status: DISCONTINUED | OUTPATIENT
Start: 2024-08-22 | End: 2024-08-27 | Stop reason: HOSPADM

## 2024-08-22 RX ORDER — ENOXAPARIN SODIUM 100 MG/ML
40 INJECTION SUBCUTANEOUS EVERY 24 HOURS
Status: DISCONTINUED | OUTPATIENT
Start: 2024-08-22 | End: 2024-08-27 | Stop reason: HOSPADM

## 2024-08-22 RX ORDER — FUROSEMIDE 10 MG/ML
20 INJECTION INTRAMUSCULAR; INTRAVENOUS EVERY 12 HOURS
Status: DISCONTINUED | OUTPATIENT
Start: 2024-08-22 | End: 2024-08-25

## 2024-08-22 RX ORDER — LANOLIN ALCOHOL/MO/W.PET/CERES
400 CREAM (GRAM) TOPICAL DAILY
Status: DISCONTINUED | OUTPATIENT
Start: 2024-08-22 | End: 2024-08-27 | Stop reason: HOSPADM

## 2024-08-22 RX ORDER — ONDANSETRON 4 MG/1
4 TABLET, ORALLY DISINTEGRATING ORAL EVERY 8 HOURS PRN
Status: DISCONTINUED | OUTPATIENT
Start: 2024-08-22 | End: 2024-08-27 | Stop reason: HOSPADM

## 2024-08-22 SDOH — SOCIAL STABILITY: SOCIAL INSECURITY: HAVE YOU HAD THOUGHTS OF HARMING ANYONE ELSE?: NO

## 2024-08-22 SDOH — SOCIAL STABILITY: SOCIAL INSECURITY: WERE YOU ABLE TO COMPLETE ALL THE BEHAVIORAL HEALTH SCREENINGS?: YES

## 2024-08-22 ASSESSMENT — PAIN SCALES - GENERAL
PAINLEVEL_OUTOF10: 0 - NO PAIN

## 2024-08-22 ASSESSMENT — LIFESTYLE VARIABLES
SUBSTANCE_ABUSE_PAST_12_MONTHS: NO
SKIP TO QUESTIONS 9-10: 1
EVER FELT BAD OR GUILTY ABOUT YOUR DRINKING: NO
HOW MANY STANDARD DRINKS CONTAINING ALCOHOL DO YOU HAVE ON A TYPICAL DAY: PATIENT DOES NOT DRINK
HAVE YOU EVER FELT YOU SHOULD CUT DOWN ON YOUR DRINKING: NO
EVER HAD A DRINK FIRST THING IN THE MORNING TO STEADY YOUR NERVES TO GET RID OF A HANGOVER: NO
AUDIT-C TOTAL SCORE: 0
AUDIT-C TOTAL SCORE: 0
HOW OFTEN DO YOU HAVE A DRINK CONTAINING ALCOHOL: NEVER
PRESCIPTION_ABUSE_PAST_12_MONTHS: NO
TOTAL SCORE: 0
HOW OFTEN DO YOU HAVE 6 OR MORE DRINKS ON ONE OCCASION: NEVER
HAVE PEOPLE ANNOYED YOU BY CRITICIZING YOUR DRINKING: NO

## 2024-08-22 ASSESSMENT — ENCOUNTER SYMPTOMS
DYSURIA: 0
COLOR CHANGE: 0
NECK STIFFNESS: 0
BACK PAIN: 0
WEAKNESS: 0
NAUSEA: 0
WOUND: 0
CHILLS: 0
SPEECH DIFFICULTY: 0
EYE DISCHARGE: 0
FEVER: 0
FREQUENCY: 0
CONSTIPATION: 0
POLYPHAGIA: 0
TROUBLE SWALLOWING: 0
UNEXPECTED WEIGHT CHANGE: 0
TREMORS: 0
ABDOMINAL DISTENTION: 0
APPETITE CHANGE: 0
ABDOMINAL PAIN: 0
SINUS PAIN: 0
POLYDIPSIA: 0
CHEST TIGHTNESS: 0
SHORTNESS OF BREATH: 1
NUMBNESS: 0
DIARRHEA: 0
HALLUCINATIONS: 0
PALPITATIONS: 0
EYE ITCHING: 0
EYE PAIN: 0
VOMITING: 0
LIGHT-HEADEDNESS: 1
DYSPHORIC MOOD: 0
CHOKING: 0
DIZZINESS: 0
WHEEZING: 0
HEADACHES: 0
FATIGUE: 1
CONFUSION: 0
ARTHRALGIAS: 0
SEIZURES: 0
PHOTOPHOBIA: 0
BLOOD IN STOOL: 0
BRUISES/BLEEDS EASILY: 0
FLANK PAIN: 0
NECK PAIN: 0
VOICE CHANGE: 0
APNEA: 0
NERVOUS/ANXIOUS: 0
MYALGIAS: 0
ADENOPATHY: 0
DIFFICULTY URINATING: 0
HEMATURIA: 0
SORE THROAT: 0
SLEEP DISTURBANCE: 0
COUGH: 0

## 2024-08-22 ASSESSMENT — PAIN - FUNCTIONAL ASSESSMENT
PAIN_FUNCTIONAL_ASSESSMENT: 0-10

## 2024-08-22 ASSESSMENT — ACTIVITIES OF DAILY LIVING (ADL)
ADEQUATE_TO_COMPLETE_ADL: YES
ASSISTIVE_DEVICE: HEARING AID - RIGHT;HEARING AID - LEFT
JUDGMENT_ADEQUATE_SAFELY_COMPLETE_DAILY_ACTIVITIES: YES
FEEDING YOURSELF: INDEPENDENT
HEARING - LEFT EAR: DIFFICULTY WITH NOISE
WALKS IN HOME: INDEPENDENT
GROOMING: INDEPENDENT
BATHING: INDEPENDENT
HEARING - RIGHT EAR: DIFFICULTY WITH NOISE
PATIENT'S MEMORY ADEQUATE TO SAFELY COMPLETE DAILY ACTIVITIES?: YES
LACK_OF_TRANSPORTATION: NO
DRESSING YOURSELF: INDEPENDENT
TOILETING: INDEPENDENT

## 2024-08-22 ASSESSMENT — COGNITIVE AND FUNCTIONAL STATUS - GENERAL
MOBILITY SCORE: 24
PATIENT BASELINE BEDBOUND: NO
MOBILITY SCORE: 24
DAILY ACTIVITIY SCORE: 24
DAILY ACTIVITIY SCORE: 24

## 2024-08-22 ASSESSMENT — PATIENT HEALTH QUESTIONNAIRE - PHQ9
SUM OF ALL RESPONSES TO PHQ9 QUESTIONS 1 & 2: 0
1. LITTLE INTEREST OR PLEASURE IN DOING THINGS: NOT AT ALL
2. FEELING DOWN, DEPRESSED OR HOPELESS: NOT AT ALL

## 2024-08-22 ASSESSMENT — COLUMBIA-SUICIDE SEVERITY RATING SCALE - C-SSRS
1. IN THE PAST MONTH, HAVE YOU WISHED YOU WERE DEAD OR WISHED YOU COULD GO TO SLEEP AND NOT WAKE UP?: NO
2. HAVE YOU ACTUALLY HAD ANY THOUGHTS OF KILLING YOURSELF?: NO
6. HAVE YOU EVER DONE ANYTHING, STARTED TO DO ANYTHING, OR PREPARED TO DO ANYTHING TO END YOUR LIFE?: NO

## 2024-08-22 NOTE — H&P
History Obtained From: Patient    History Of Present Illness:  Shani Watson is a 68 y.o. female with PMHx s/f CAD prior Cabg, recurrent VTACH s/p ablation ICD pacemaker, Degenerative disk disease HTN, depression presenting with light headedness.  Patient has been feeling somewhat fatigued and weak over past few weeks.  This morning patient was lightheaded short of breath contacted EMS was found to be in ventricular tachycardia patient was given dose of IV amiodarone subsequently converted and route.  In the emergency department on presentation her vital signs showed temperature 97.9 heart rate 75 respiratory rate 16 blood pressure 125/83 SpO2 99% on room air.  Chemistry panel showed sodium 133 potassium 4.0 chloride 102 CO2 25 BUN 12 creatinine 1.12 glucose 174 bilirubin 1.8 other liver enzymes within normal limits troponin 11 repeat troponin 13 BN peptide 876 TSH 2.92 CBC with white blood cell count 5.4 hemoglobin 12.0 hematocrit 36.2 platelets 186.  Chest x-ray showing limited inflation pulmonary edema small right pleural effusion.  In the emergency department patient got an additional 400 mg of magnesium sulfate.  The ED provider spoke to cardiology on-call who advised keeping the patient for admission.          ED Course:  ED Course as of 08/22/24 1151   Thu Aug 22, 2024   0932 EKG shows paced rhythm with negative Sgarbossa criteria. [RS]      ED Course User Index  [RS] Faisal Bhatti DO         Diagnoses as of 08/22/24 1151   Ventricular tachycardia (Multi)     Relevant Results  Results for orders placed or performed during the hospital encounter of 08/22/24 (from the past 24 hour(s))   CBC and Auto Differential   Result Value Ref Range    WBC 5.4 4.4 - 11.3 x10*3/uL    nRBC 0.0 0.0 - 0.0 /100 WBCs    RBC 4.14 4.00 - 5.20 x10*6/uL    Hemoglobin 12.0 12.0 - 16.0 g/dL    Hematocrit 36.2 36.0 - 46.0 %    MCV 87 80 - 100 fL    MCH 29.0 26.0 - 34.0 pg    MCHC 33.1 32.0 - 36.0 g/dL    RDW 13.7 11.5 - 14.5 %     Platelets 186 150 - 450 x10*3/uL    Neutrophils % 79.8 40.0 - 80.0 %    Immature Granulocytes %, Automated 0.4 0.0 - 0.9 %    Lymphocytes % 10.1 13.0 - 44.0 %    Monocytes % 5.3 2.0 - 10.0 %    Eosinophils % 3.7 0.0 - 6.0 %    Basophils % 0.7 0.0 - 2.0 %    Neutrophils Absolute 4.33 1.20 - 7.70 x10*3/uL    Immature Granulocytes Absolute, Automated 0.02 0.00 - 0.70 x10*3/uL    Lymphocytes Absolute 0.55 (L) 1.20 - 4.80 x10*3/uL    Monocytes Absolute 0.29 0.10 - 1.00 x10*3/uL    Eosinophils Absolute 0.20 0.00 - 0.70 x10*3/uL    Basophils Absolute 0.04 0.00 - 0.10 x10*3/uL   Comprehensive metabolic panel   Result Value Ref Range    Glucose 174 (H) 74 - 99 mg/dL    Sodium 133 (L) 136 - 145 mmol/L    Potassium 4.0 3.5 - 5.3 mmol/L    Chloride 102 98 - 107 mmol/L    Bicarbonate 25 21 - 32 mmol/L    Anion Gap 10 10 - 20 mmol/L    Urea Nitrogen 12 6 - 23 mg/dL    Creatinine 1.12 (H) 0.50 - 1.05 mg/dL    eGFR 53 (L) >60 mL/min/1.73m*2    Calcium 8.6 8.6 - 10.3 mg/dL    Albumin 4.2 3.4 - 5.0 g/dL    Alkaline Phosphatase 92 33 - 136 U/L    Total Protein 6.2 (L) 6.4 - 8.2 g/dL    AST 25 9 - 39 U/L    Bilirubin, Total 1.8 (H) 0.0 - 1.2 mg/dL    ALT 14 7 - 45 U/L   Magnesium   Result Value Ref Range    Magnesium 1.85 1.60 - 2.40 mg/dL   B-Type Natriuretic Peptide   Result Value Ref Range     (H) 0 - 99 pg/mL   Troponin I, High Sensitivity, Initial   Result Value Ref Range    Troponin I, High Sensitivity 11 0 - 13 ng/L   Troponin I, High Sensitivity   Result Value Ref Range    Troponin I, High Sensitivity 13 0 - 13 ng/L      XR chest 1 view    Result Date: 8/22/2024  Interpreted By:  Ashley Alexis, STUDY: XR CHEST 1 VIEW;  8/22/2024 9:48 am   INDICATION: Signs/Symptoms:SOB.   COMPARISON: 07/28/2020   ACCESSION NUMBER(S): [id number]   ORDERING CLINICIAN: VINNY PICKETT   FINDINGS: Multi polar left subclavian pacer/AICD wires remain in place. Associated port obscures the lateral left mid chest as previously. Artifact from  additional presumed overlying monitoring/support devices. Limited pulmonary inflation. Perihilar vascular congestion. Slight blunting of the right costophrenic angle. Cardiac silhouette is mildly enlarged status post sternotomy.       Limited pulmonary inflation. Enlarged cardiac silhouette with perihilar vascular congestion and probable small right pleural effusion.   MACRO: None.   Signed by: Ashley Alexis 8/22/2024 10:15 AM Dictation workstation:   [id number]     Scheduled medications:  furosemide, 20 mg, intravenous, q12h  magnesium oxide, 400 mg, oral, Daily      Continuous medications:     PRN medications:        Past Medical History  She has a past medical history of Combined forms of age-related cataract, bilateral (04/03/2019), Depression, unspecified (11/16/2016), Dry eyes, Essential (primary) hypertension (11/16/2016), Heart disease, unspecified, Other vitreous opacities, right eye (04/03/2019), Personal history of other diseases of the circulatory system (11/16/2016), Personal history of other diseases of the musculoskeletal system and connective tissue, Personal history of other diseases of the nervous system and sense organs, Personal history of other diseases of the nervous system and sense organs, Personal history of other mental and behavioral disorders (05/22/2020), and PVD (posterior vitreous detachment), right eye.    Surgical History  She has a past surgical history that includes Tonsillectomy (11/16/2016); Dilation and curettage of uterus (11/16/2016); Cataract extraction (11/16/2016); Other surgical history (06/11/2019); Other surgical history (04/03/2019); Cardiac pacemaker placement (05/01/2018); Cardiac surgery; Cardiac catheterization (N/A, 4/11/2024); and Cardiac electrophysiology procedure (N/A, 6/14/2024).     Social History  She reports that she has never smoked. She has never used smokeless tobacco. She reports current alcohol use. She reports that she does not use drugs.    Family  History  Family History   Problem Relation Name Age of Onset    Heart disease Mother      Hypertension Mother      Coronary artery disease Mother      Osteoporosis Mother      Heart disease Father      Melanoma Father      Gout Father      Prostate cancer Father      Hypertension Brother      Amblyopia Brother      Cancer Father's Sister      Hypertension Maternal Grandfather      Heart attack Maternal Grandfather      Coronary artery disease Maternal Grandfather      Liver cancer Paternal Grandmother      Breast cancer Cousin          Allergies  Bactrim [sulfamethoxazole-trimethoprim], Erythromycin, Amoxicillin, Milk, Statins-hmg-coa reductase inhibitors, Valacyclovir, Codeine, Diazepam, Latex, Meperidine, Penicillins, and Sulfa (sulfonamide antibiotics)    Code Status  Full Code     Review of Systems   Constitutional:  Positive for fatigue. Negative for appetite change, chills, fever and unexpected weight change.   HENT:  Negative for congestion, ear discharge, ear pain, mouth sores, nosebleeds, sinus pain, sore throat, trouble swallowing and voice change.    Eyes:  Negative for photophobia, pain, discharge, itching and visual disturbance.   Respiratory:  Positive for shortness of breath. Negative for apnea, cough, choking, chest tightness and wheezing.    Cardiovascular:  Negative for chest pain, palpitations and leg swelling.   Gastrointestinal:  Negative for abdominal distention, abdominal pain, blood in stool, constipation, diarrhea, nausea and vomiting.   Endocrine: Negative for cold intolerance, heat intolerance, polydipsia, polyphagia and polyuria.   Genitourinary:  Negative for decreased urine volume, difficulty urinating, dysuria, flank pain, frequency, hematuria and urgency.   Musculoskeletal:  Negative for arthralgias, back pain, gait problem, myalgias, neck pain and neck stiffness.   Skin:  Negative for color change, pallor and wound.   Allergic/Immunologic: Negative for food allergies and  immunocompromised state.   Neurological:  Positive for light-headedness. Negative for dizziness, tremors, seizures, syncope, speech difficulty, weakness, numbness and headaches.   Hematological:  Negative for adenopathy. Does not bruise/bleed easily.   Psychiatric/Behavioral:  Negative for confusion, dysphoric mood, hallucinations, sleep disturbance and suicidal ideas. The patient is not nervous/anxious.        Last Recorded Vitals  /80   Pulse 61   Temp 36.6 °C (97.9 °F)   Resp 16   Wt 61.1 kg (134 lb 12.8 oz)   SpO2 99%      Physical Exam  Vitals reviewed.   Constitutional:       General: She is not in acute distress.  HENT:      Head: Normocephalic and atraumatic.      Right Ear: External ear normal.      Left Ear: External ear normal.      Nose: Nose normal.      Mouth/Throat:      Mouth: Mucous membranes are moist.      Pharynx: Oropharynx is clear.   Eyes:      Extraocular Movements: Extraocular movements intact.      Conjunctiva/sclera: Conjunctivae normal.      Pupils: Pupils are equal, round, and reactive to light.   Neck:      Vascular: No carotid bruit.   Cardiovascular:      Rate and Rhythm: Normal rate and regular rhythm.      Pulses: Normal pulses.      Heart sounds: Murmur heard.   Pulmonary:      Effort: Pulmonary effort is normal. No respiratory distress.      Breath sounds: Normal breath sounds. No wheezing, rhonchi or rales.      Comments: Bilateral crackles  Chest:      Chest wall: No tenderness.   Abdominal:      General: Bowel sounds are normal. There is no distension.      Palpations: Abdomen is soft. There is no mass.      Tenderness: There is no abdominal tenderness. There is no rebound.   Musculoskeletal:         General: No swelling or deformity. Normal range of motion.      Cervical back: Normal range of motion.      Right lower leg: No edema.      Left lower leg: No edema.   Skin:     General: Skin is warm and dry.      Capillary Refill: Capillary refill takes less than 2  seconds.   Neurological:      General: No focal deficit present.      Mental Status: She is alert and oriented to person, place, and time.   Psychiatric:         Mood and Affect: Mood normal.         Behavior: Behavior normal.         Assessment/Plan   Assessment & Plan  Ventricular tachycardia (Multi)    CAD in native artery    Cardiac resynchronization therapy defibrillator (CRT-D) in place    Chronic systolic heart failure (Multi)    Blood glucose abnormal    Recurrent ventricular tachycardia, history of V. tach ablation, history of ICD  Continue patient on metoprolol and amiodarone  Consult cardiology    Acute on chronic systolic CHF ikely secondary to ventricular tachycardia  IV furosemide and home meds    Coronary artery disease history CABG  Patient without any complaint of chest pain, enzymes not trending upward significantly  We will continue routine cardiac medications    Degenerative disc disease  Continue as needed gabapentin      Abnormal blood glucose  Check hemoglobin A1c      No new Assessment & Plan notes have been filed under this hospital service since the last note was generated.  Service: Internal Medicine          Manjeet Romero, APRN-CNP    Dragon dictation software was used to dictate this note and thus there may be minor errors in translation/transcription including garbled speech or misspellings. Please contact for clarification if needed.

## 2024-08-22 NOTE — ED PROVIDER NOTES
HPI   Chief Complaint   Patient presents with   • Shortness of Breath       68-year-old female with past medical history of hypertension, cardiomyopathy, prior coronary disease status post CABG, prior paroxysmal V. tach status post ablation and ICD placement presents to ED with concerns for shortness of breath.  Patient states been having exertional shortness of breath for quite some time.  However this morning after waking up she felt very lightheaded.  She called EMS.  When EMS arrived they noticed that she was in V. tach.  On her way to the ER patient spontaneous converted to paced rhythm.  She was given 1 dose of 150 mg of IV amiodarone.  Patient says she feels better now.  Denies any worsening chest pain or shortness of breath during this event.  No leg swelling.  Patient does say that they decreased her metoprolol couple weeks ago.               Patient History   Past Medical History:   Diagnosis Date   • Combined forms of age-related cataract, bilateral 04/03/2019    Combined form of age-related cataract, both eyes   • Depression, unspecified 11/16/2016    Depression   • Dry eyes    • Essential (primary) hypertension 11/16/2016    Benign essential hypertension   • Heart disease, unspecified     Heart problem   • Other vitreous opacities, right eye 04/03/2019    Vitreous floaters of right eye   • Personal history of other diseases of the circulatory system 11/16/2016    History of hypertension   • Personal history of other diseases of the musculoskeletal system and connective tissue     History of degenerative disc disease   • Personal history of other diseases of the nervous system and sense organs     History of migraine   • Personal history of other diseases of the nervous system and sense organs     History of cataract   • Personal history of other mental and behavioral disorders 05/22/2020    History of anxiety   • PVD (posterior vitreous detachment), right eye      Past Surgical History:   Procedure  Laterality Date   • CARDIAC CATHETERIZATION N/A 4/11/2024    Procedure: Left Heart Cath, No LV;  Surgeon: Liban Campa MD;  Location: [facility] Cardiac Cath Lab;  Service: Cardiovascular;  Laterality: N/A;   • CARDIAC ELECTROPHYSIOLOGY PROCEDURE N/A 6/14/2024    Procedure: Ablation VT;  Surgeon: Manuel Macias MD;  Location: [place] [address] Cardiac Cath Lab;  Service: Electrophysiology;  Laterality: N/A;   • CARDIAC PACEMAKER PLACEMENT  05/01/2018    Pacemaker Permanent Placement   • CARDIAC SURGERY     • CATARACT EXTRACTION  11/16/2016    Cataract Surgery   • DILATION AND CURETTAGE OF UTERUS  11/16/2016    Dilation And Curettage   • OTHER SURGICAL HISTORY  06/11/2019    Radiofrequency ablation   • OTHER SURGICAL HISTORY  04/03/2019    Cataract surgery   • TONSILLECTOMY  11/16/2016    Tonsillectomy     Family History   Problem Relation Name Age of Onset   • Heart disease Mother     • Hypertension Mother     • Coronary artery disease Mother     • Osteoporosis Mother     • Heart disease Father     • Melanoma Father     • Gout Father     • Prostate cancer Father     • Hypertension Brother     • Amblyopia Brother     • Cancer Father's Sister     • Hypertension Maternal Grandfather     • Heart attack Maternal Grandfather     • Coronary artery disease Maternal Grandfather     • Liver cancer Paternal Grandmother     • Breast cancer Cousin       Social History     Tobacco Use   • Smoking status: Never   • Smokeless tobacco: Never   Vaping Use   • Vaping status: Never Used   Substance Use Topics   • Alcohol use: Yes     Comment: maybe 5 a year   • Drug use: Never       Physical Exam   ED Triage Vitals [08/22/24 0926]   Temp Heart Rate Respirations BP   -- 73 16 125/83      Pulse Ox Temp src Heart Rate Source Patient Position   99 % -- -- --      BP Location FiO2 (%)     -- --       Physical Exam  Vitals and nursing note reviewed.   Constitutional:       General: She is not in acute distress.     Appearance: She is well-developed.    HENT:      Head: Normocephalic and atraumatic.   Eyes:      Conjunctiva/sclera: Conjunctivae normal.   Cardiovascular:      Rate and Rhythm: Normal rate and regular rhythm.      Heart sounds: No murmur heard.  Pulmonary:      Effort: Pulmonary effort is normal. No respiratory distress.      Breath sounds: Normal breath sounds.   Abdominal:      Palpations: Abdomen is soft.      Tenderness: There is no abdominal tenderness.   Musculoskeletal:         General: No swelling.      Cervical back: Neck supple.      Right lower leg: No tenderness. No edema.      Left lower leg: No tenderness. No edema.   Skin:     General: Skin is warm and dry.      Capillary Refill: Capillary refill takes less than 2 seconds.   Neurological:      Mental Status: She is alert.   Psychiatric:         Mood and Affect: Mood normal.           ED Course & MDM   ED Course as of 09/30/24 0000   Thu Aug 22, 2024   0932 EKG shows paced rhythm with negative Sgarbossa criteria. [RS]      ED Course User Index  [RS] Faisal Bhatti,          Diagnoses as of 09/30/24 0000   Ventricular tachycardia (Multi) - Add mexiletine yesterday, continue Coreg and amiodarone drip, monitor electrolytes. may switch to oral amiodarone. need EP follow up.                 No data recorded     [place] Coma Scale Score: 15 (08/22/24 0929 : Lurdes Espinoza RN)                           Medical Decision Making  HISTORIAN:  Patient    CHART REVIEW:  Patient has history of V. tach ablation, ICD.     PT SUMMARY:  68-year-old female presents ED with V. tach.  Vital signs stable    DDX:  Paroxysmal V. tach, electrolyte abnormality, heart failure, ACS, HARRY    PLAN:  Obtain CBC, BMP, EKG, troponin, BNP, chest    DISPO/RE-EVAL:  Patient received a dose of 150 mg of IV amiodarone prior to arrival.  Since he has been in the ED she has been in a paced rhythm.  Her workup is consistent with elevated BNP and pulmonary vascular congestion.  I spoke with Dr. Pickens of cardiology who  recommended no acute treatment here in the ED, just recommended admission for further monitoring.  Spoke with medical team who is agreeable to admit patient to ICU for further management and evaluation.           Procedure  Procedures     Faisal Bhatti, DO  08/22/24 1121       Faisal Bhatti, DO  09/30/24 0000

## 2024-08-22 NOTE — PROGRESS NOTES
Shani Watson is a 68 y.o. female admitted for Ventricular tachycardia (Multi). Pharmacy reviewed the patient's [id number] medications and allergies for accuracy.    The list below reflects the PTA list prior to pharmacy medication history. A summary a changes to the PTA medication list has been listed below. Please review each medication in order reconciliation for additional clarification and justification.    Source of information:  T2P    Medications added:    Medications modified:  Flonase 50mcg 1 spray bid --> 1 spray bid prn    Medications to be removed:  Tizanidine 4mg    Medications of concern:      Prior to Admission Medications   Prescriptions Last Dose Informant Patient Reported? Taking?   acetaminophen (Tylenol) 500 mg tablet  Self Yes No   Sig: Take 1 tablet (500 mg) by mouth every 6 hours if needed.   amiodarone (Pacerone) 100 mg tablet   No No   Sig: Take 1 tablet (100 mg) by mouth once daily.   ezetimibe (Zetia) 10 mg tablet  Self No No   Sig: Take 1 tablet (10 mg) by mouth once daily.   fluticasone (Flonase) 50 mcg/actuation nasal spray  Self Yes No   Sig: Administer 1 spray into affected nostril(s) 2 times a day.   gabapentin (Neurontin) 300 mg capsule   No No   Sig: Take 1-2 capsules (300-600 mg) by mouth once daily as needed (heel pain).   metoprolol succinate XL (Toprol-XL) 25 mg 24 hr tablet   No No   Sig: Take 1 tablet (25 mg) by mouth once daily.   multivitamin-min-iron-FA-vit K (Adults Multivitamin) 18 mg iron-400 mcg-25 mcg tablet  Self Yes No   Sig: Take 1 tablet by mouth once daily.   sertraline (Zoloft) 100 mg tablet   No No   Sig: Take 1 tablet (100 mg) by mouth once daily.   sertraline (Zoloft) 50 mg tablet   No No   Sig: Take 1 tablet (50 mg) by mouth once daily.   spironolactone (Aldactone) 25 mg tablet  Self No No   Sig: Take 0.5 tablets (12.5 mg) by mouth once daily.   tiZANidine (Zanaflex) 4 mg tablet  Self No No   Sig: Take 1 tablet (4 mg) by mouth every 8 hours if  needed for muscle spasms.      Facility-Administered Medications: None       Jaimie Lang

## 2024-08-22 NOTE — CONSULTS
[facility]    Patient Name: Shani Watson  Patient : 1955  Room/Bed: 09/[facility]    Date: 24  Time: 12:38 PM    Referred by Dr. Ferreira ref. provider found for Shortness of Breath     History Of Present Illness:    Shani Watson is a 68 y.o. female who presented to the emergency department with shortness of breath, lightheadedness, and generalized fatigue.  Patient reports that she has noticed increased shortness of breath which has been worsening for the last week.  This morning after taking her dog outside she noted she was lightheaded and mildly nauseated.  Her shortness of breath was further increased.  She denies any associated palpitations but given her symptoms called EMS.  EMS noted that she was in VT but spontaneously converted back to a paced rhythm.  Patient was given IV amiodarone by EMS.  BMP shows a serum sodium of 133, serum potassium of 4.0, serum creatinine of 1.12.  Serum magnesium was 1.85.  BNP was 876.  Troponin was 11-13.  CBC showed a hemoglobin of 12.0.  TSH done 2024 was 2.92.  Chest x-ray shows cardiomegaly with perihilar vascular congestion and small right pleural effusion.  Cardiac catheterization done in 2024 showed moderate nonobstructive CAD with a patent LIMA to LAD.  Echocardiogram done in 2024 showed mildly reduced left ventricular systolic function with an ejection fraction of 45%, grade 3 diastolic dysfunction, low normal right ventricular systolic function, moderate mitral valve regurgitation, and mild to moderate tricuspid valve regurgitation.  ECG showed AV paced rhythm with heart rate of 73 bpm.  During my exam the patient was resting comfortably in bed.    Assessment/Plan:   1.  VT  Patient had an episode of VT noted by EMS for which she received IV amiodarone and magnesium.  The patient has a history of ventricular tachycardia and is underwent 2 prior ablations VT ablation in 2019 and again in 2024.  Patient  also has a BiV ICD in place and is chronically on amiodarone therapy.  Would restart home beta-blocker and amiodarone therapy.  Will have patient evaluated by EP cardiology.    2.  HFrEF  Patient presenting with increased shortness of breath found to have elevated BNP of 876 as well as chest x-ray showing perihilar vascular congestion and small pleural effusion.  Patient has been started on IV furosemide for diuresis.  Would continue home dose of spironolactone.  Will add SGLT2 inhibitor.  Continue beta-blocker therapy.  Monitor urine output, renal function, and serum electrolytes while here.  Echocardiogram done in January 2024 showed mildly reduced left ventricular systolic function with an ejection fraction of 45%, grade 3 diastolic dysfunction, low normal right ventricular systolic function, moderate mitral valve regurgitation, and mild to moderate tricuspid valve regurgitation.     3.  CAD  The patient has a history of coronary artery disease with prior bypass done in October 2018.  Cardiac catheterization done in April 2024 showed moderate nonobstructive CAD with a patent LIMA to LAD.  This appears stable she denies any chest discomfort.  Troponin was 11-13.  Continue with medical therapy.    4.  Mitral regurgitation  Echocardiogram done in January 2024 showed mildly reduced left ventricular systolic function with an ejection fraction of 45%, grade 3 diastolic dysfunction, low normal right ventricular systolic function, moderate mitral valve regurgitation, and mild to moderate tricuspid valve regurgitation.     5.  Hypertension  Blood pressure appears well-controlled.  Would continue home antihypertensive medical therapy.    6.  Dyslipidemia  Patient is on Zetia as an outpatient.    Past Medical History:  She has a past medical history of Combined forms of age-related cataract, bilateral (04/03/2019), Depression, unspecified (11/16/2016), Dry eyes, Essential (primary) hypertension (11/16/2016), Heart disease,  unspecified, Other vitreous opacities, right eye (04/03/2019), Personal history of other diseases of the circulatory system (11/16/2016), Personal history of other diseases of the musculoskeletal system and connective tissue, Personal history of other diseases of the nervous system and sense organs, Personal history of other diseases of the nervous system and sense organs, Personal history of other mental and behavioral disorders (05/22/2020), and PVD (posterior vitreous detachment), right eye.    Past Surgical History:  She has a past surgical history that includes Tonsillectomy (11/16/2016); Dilation and curettage of uterus (11/16/2016); Cataract extraction (11/16/2016); Other surgical history (06/11/2019); Other surgical history (04/03/2019); Cardiac pacemaker placement (05/01/2018); Cardiac surgery; Cardiac catheterization (N/A, 4/11/2024); and Cardiac electrophysiology procedure (N/A, 6/14/2024).      Social History:  She reports that she has never smoked. She has never used smokeless tobacco. She reports current alcohol use. She reports that she does not use drugs.    Family History:  Family History   Problem Relation Name Age of Onset    Heart disease Mother      Hypertension Mother      Coronary artery disease Mother      Osteoporosis Mother      Heart disease Father      Melanoma Father      Gout Father      Prostate cancer Father      Hypertension Brother      Amblyopia Brother      Cancer Father's Sister      Hypertension Maternal Grandfather      Heart attack Maternal Grandfather      Coronary artery disease Maternal Grandfather      Liver cancer Paternal Grandmother      Breast cancer Cousin          Allergies:  Bactrim [sulfamethoxazole-trimethoprim], Erythromycin, Amoxicillin, Milk, Statins-hmg-coa reductase inhibitors, Valacyclovir, Codeine, Diazepam, Latex, Meperidine, Penicillins, and Sulfa (sulfonamide antibiotics)    Outpatient Medications:  Current Outpatient Medications   Medication  "Instructions    acetaminophen (TYLENOL) 500 mg, oral, Every 6 hours PRN    amiodarone (PACERONE) 100 mg, oral, Daily    ezetimibe (ZETIA) 10 mg, oral, Daily    fluticasone (Flonase) 50 mcg/actuation nasal spray 1 spray, nasal, 2 times daily    gabapentin (NEURONTIN) 300-600 mg, oral, Daily PRN    metoprolol succinate XL (TOPROL-XL) 25 mg, oral, Daily    multivitamin-min-iron-FA-vit K (Adults Multivitamin) 18 mg iron-400 mcg-25 mcg tablet 1 tablet, oral, Daily    sertraline (ZOLOFT) 50 mg, oral, Daily    sertraline (ZOLOFT) 100 mg, oral, Daily    spironolactone (ALDACTONE) 12.5 mg, oral, Daily    tiZANidine (ZANAFLEX) 4 mg, oral, Every 8 hours PRN        ROS:  A 14 point review of systems was done and is negative other than as stated in HPI    Vitals:  Vitals:    08/22/24 0926 08/22/24 0951 08/22/24 1030 08/22/24 1130   BP: 125/83  116/86 119/80   Pulse: 73  64 61   Resp: 16  16 16   Temp:  36.6 °C (97.9 °F)     SpO2: 99%  97% 99%   Weight: 61.1 kg (134 lb 12.8 oz)      Height: 1.549 m (5' 1\")          Physical Exam:     Constitutional: Cooperative, in no acute distress, alert, appears stated age.  Skin: Skin color, texture, turgor normal. No rashes or lesions.  Head: Normocephalic. No masses, lesions, tenderness or abnormalities  Eyes: Extraocular movements are grossly intact.  Mouth and throat: Mucous membranes moist  Neck: Neck supple, no carotid bruits, no JVD  Respiratory: Rales at bases bilaterally, no use of accessory muscles  Chest wall: Sternal scar, ICD, normal excursion with respiration  Cardiovascular: Regular rhythm with + murmur  Gastrointestinal: Abdomen soft, nontender. Bowel sounds normal.  Musculoskeletal: Strength equal in upper extremities  Extremities: No pitting edema  Neurologic: Sensation grossly intact, alert and oriented ×3    Intake/Output:   No intake/output data recorded.    Outpatient Medications  No current facility-administered medications on file prior to encounter.     Current " Outpatient Medications on File Prior to Encounter   Medication Sig Dispense Refill    acetaminophen (Tylenol) 500 mg tablet Take 1 tablet (500 mg) by mouth every 6 hours if needed.      amiodarone (Pacerone) 100 mg tablet Take 1 tablet (100 mg) by mouth once daily. 90 tablet 3    ezetimibe (Zetia) 10 mg tablet Take 1 tablet (10 mg) by mouth once daily. 90 tablet 1    fluticasone (Flonase) 50 mcg/actuation nasal spray Administer 1 spray into affected nostril(s) 2 times a day.      gabapentin (Neurontin) 300 mg capsule Take 1-2 capsules (300-600 mg) by mouth once daily as needed (heel pain). 180 capsule 1    metoprolol succinate XL (Toprol-XL) 25 mg 24 hr tablet Take 1 tablet (25 mg) by mouth once daily. 90 tablet 1    multivitamin-min-iron-FA-vit K (Adults Multivitamin) 18 mg iron-400 mcg-25 mcg tablet Take 1 tablet by mouth once daily.      sertraline (Zoloft) 100 mg tablet Take 1 tablet (100 mg) by mouth once daily. 90 tablet 1    sertraline (Zoloft) 50 mg tablet Take 1 tablet (50 mg) by mouth once daily. 90 tablet 1    spironolactone (Aldactone) 25 mg tablet Take 0.5 tablets (12.5 mg) by mouth once daily. 45 tablet 1    tiZANidine (Zanaflex) 4 mg tablet Take 1 tablet (4 mg) by mouth every 8 hours if needed for muscle spasms. 30 tablet 1       Scheduled medications  furosemide, 20 mg, intravenous, q12h  magnesium oxide, 400 mg, oral, Daily      Continuous medications     PRN medications     (Not in a hospital admission)      Recent Labs: (past 2 days)  Recent Results (from the past 48 hour(s))   CBC and Auto Differential    Collection Time: 08/22/24  9:35 AM   Result Value Ref Range    WBC 5.4 4.4 - 11.3 x10*3/uL    nRBC 0.0 0.0 - 0.0 /100 WBCs    RBC 4.14 4.00 - 5.20 x10*6/uL    Hemoglobin 12.0 12.0 - 16.0 g/dL    Hematocrit 36.2 36.0 - 46.0 %    MCV 87 80 - 100 fL    MCH 29.0 26.0 - 34.0 pg    MCHC 33.1 32.0 - 36.0 g/dL    RDW 13.7 11.5 - 14.5 %    Platelets 186 150 - 450 x10*3/uL    Neutrophils % 79.8 40.0 -  80.0 %    Immature Granulocytes %, Automated 0.4 0.0 - 0.9 %    Lymphocytes % 10.1 13.0 - 44.0 %    Monocytes % 5.3 2.0 - 10.0 %    Eosinophils % 3.7 0.0 - 6.0 %    Basophils % 0.7 0.0 - 2.0 %    Neutrophils Absolute 4.33 1.20 - 7.70 x10*3/uL    Immature Granulocytes Absolute, Automated 0.02 0.00 - 0.70 x10*3/uL    Lymphocytes Absolute 0.55 (L) 1.20 - 4.80 x10*3/uL    Monocytes Absolute 0.29 0.10 - 1.00 x10*3/uL    Eosinophils Absolute 0.20 0.00 - 0.70 x10*3/uL    Basophils Absolute 0.04 0.00 - 0.10 x10*3/uL   Comprehensive metabolic panel    Collection Time: 08/22/24  9:35 AM   Result Value Ref Range    Glucose 174 (H) 74 - 99 mg/dL    Sodium 133 (L) 136 - 145 mmol/L    Potassium 4.0 3.5 - 5.3 mmol/L    Chloride 102 98 - 107 mmol/L    Bicarbonate 25 21 - 32 mmol/L    Anion Gap 10 10 - 20 mmol/L    Urea Nitrogen 12 6 - 23 mg/dL    Creatinine 1.12 (H) 0.50 - 1.05 mg/dL    eGFR 53 (L) >60 mL/min/1.73m*2    Calcium 8.6 8.6 - 10.3 mg/dL    Albumin 4.2 3.4 - 5.0 g/dL    Alkaline Phosphatase 92 33 - 136 U/L    Total Protein 6.2 (L) 6.4 - 8.2 g/dL    AST 25 9 - 39 U/L    Bilirubin, Total 1.8 (H) 0.0 - 1.2 mg/dL    ALT 14 7 - 45 U/L   Magnesium    Collection Time: 08/22/24  9:35 AM   Result Value Ref Range    Magnesium 1.85 1.60 - 2.40 mg/dL   B-Type Natriuretic Peptide    Collection Time: 08/22/24  9:35 AM   Result Value Ref Range     (H) 0 - 99 pg/mL   Troponin I, High Sensitivity, Initial    Collection Time: 08/22/24  9:35 AM   Result Value Ref Range    Troponin I, High Sensitivity 11 0 - 13 ng/L   Troponin I, High Sensitivity    Collection Time: 08/22/24 10:50 AM   Result Value Ref Range    Troponin I, High Sensitivity 13 0 - 13 ng/L       CV Studies:  EKG:  Encounter Date: 07/22/24   ECG 12 lead (Clinic Performed)   Result Value    Ventricular Rate 86    Atrial Rate 86    TN Interval 158    QRS Duration 180    QT Interval 466    QTC Calculation(Bazett) 557    P Axis 37    R Axis -85    T Axis 99    QRS Count 14     Q Onset 182    P Onset 131    P Offset 159    T Offset 415    QTC Fredericia 525    Narrative    AV dual-paced rhythm  Abnormal ECG  When compared with ECG of 15-YON-2024 03:54,  Vent. rate has increased BY  23 BPM  Confirmed by Manuel Macias ([id number]) on 7/22/2024 2:26:53 PM     Echocardiogram:   Echocardiogram     [place] [id number]  Phone [phone] Fax [phone]    TRANSTHORACIC ECHOCARDIOGRAM REPORT      Patient Name:     FAYE CHERY Reading Physician:   [id number] Nico Pickens DO  Study Date:       10/11/2022      Referring Physician: [id number] BERGMAN  MRN/PID:          [id number]        PCP:  Accession/Order#: [id number]    Department Location: [facility]  YOB: 1955      Fellow:  Gender:           F               Nurse:  Admit Date:       10/11/2022      Sonographer:         Yesica Hunter [facility]  Admission Status: Outpatient      Additional Staff:  Height:           154.94 cm       CC Report to:  Weight:           61.69 kg        Study Type:          Echocardiogram  BSA:              1.60 m2  Blood Pressure: 118 /66 mmHg    Diagnosis/ICD: I25.5-Ischemic cardiomyopathy; I34.0-Nonrheumatic mitral (valve)  insufficiency  Indication:    Ischemic cardiomyopathy, Mitral regurgitation  Procedure/CPT: Echo Complete w Full [id number]  Study Detail: The following Echo studies were performed: 2D, M-Mode, Doppler and  color flow.      PHYSICIAN INTERPRETATION:  Left Ventricle: Left ventricular systolic function is mildly to moderately decreased, with an estimated ejection fraction of 40-45%. Wall motion is abnormal. The left ventricular cavity size is normal. Spectral Doppler shows a pseudonormal pattern of left ventricular diastolic filling.  LV Wall Scoring:  The basal and mid anterior wall and basal and mid anterolateral wall are  akinetic. The basal and mid inferolateral wall, apical lateral segment, and  apical anterior  segment are hypokinetic. All remaining scored segments are  normal.    Left Atrium: The left atrium is moderately dilated.  Right Ventricle: The right ventricle is normal in size. There is low normal right ventricular systolic function. A device is visualized in the right ventricle.  Right Atrium: The right atrium is upper limits of normal in size. There is a device visualized in the right atrium.  Aortic Valve: The aortic valve is trileaflet. There is no evidence of aortic valve stenosis.  There is trivial aortic valve regurgitation. The mean gradient of the aortic valve is 6.0 mmHg.  Mitral Valve: The mitral valve is mildly thickened. There is moderately decreased mitral valve posterior leaflet mobility. There is moderate mitral valve regurgitation which is posteriorly directed.  Tricuspid Valve: The tricuspid valve is structurally normal. There is mild tricuspid regurgitation.  Pulmonic Valve: The pulmonic valve is structurally normal. There is physiologic pulmonic valve regurgitation.  Pericardium: There is no pericardial effusion noted.  Aorta: The aortic root is normal.  Systemic Veins: The inferior vena cava appears to be of normal size. There is IVC inspiratory collapse greater than 50%.      CONCLUSIONS:  1. Left ventricular systolic function is mildly to moderately decreased with a 40-45% estimated ejection fraction.  2. Multiple segmental abnormalities exist. See findings.  3. Spectral Doppler shows a pseudonormal pattern of left ventricular diastolic filling.  4. There is low normal right ventricular systolic function.  5. The left atrium is moderately dilated.  6. Moderate mitral valve regurgitation.  7. Moderately decreased mitral valve posterior leaflet mobility.  8. Aortic valve stenosis is not present.    QUANTITATIVE DATA SUMMARY:  2D MEASUREMENTS:  Normal Ranges:  IVSd:          1.10 cm    (0.6-1.1cm)  LVPWd:         0.90 cm    (0.6-1.1cm)  LVIDd:         4.80 cm    (3.9-5.9cm)  LVIDs:          4.00 cm  LV Mass Index: 106.1 g/m2  LV % FS        16.7 %    LA VOLUME:  Normal Ranges:  LA Vol A4C:        65.4 ml    (22+/-6mL/m2)  LA Vol A2C:        95.0 ml  LA Vol BP:         79.5 ml  LA Vol Index A4C:  40.8ml/m2  LA Vol Index A2C:  59.3 ml/m2  LA Vol Index BP:   49.6 ml/m2  LA Area A4C:       21.3 cm2  LA Area A2C:       25.9 cm2  LA Major Axis A4C: 5.9 cm  LA Major Axis A2C: 6.0 cm  LA Volume Index:   46.3 ml/m2  LA Vol A4C:        61.0 ml  LA Vol A2C:        91.0 ml    RA VOLUME BY A/L METHOD:  Normal Ranges:  RA Area A4C: 14.6 cm2    M-MODE MEASUREMENTS:  Normal Ranges:  Ao Root: 2.50 cm (2.0-3.7cm)  AoV Exc: 1.30 cm (1.5-2.5cm)  LAs:     4.10 cm (2.7-4.0cm)    AORTA MEASUREMENTS:  Normal Ranges:  AoV Exc:     1.30 cm (1.5-2.5cm)  Ao Sinus, d: 2.50 cm (2.1-3.5cm)  Ao STJ, d:   2.20 cm (1.7-3.4cm)  Asc Ao, d:   2.90 cm (2.1-3.4cm)    LV SYSTOLIC FUNCTION BY 2D PLANIMETRY (MOD):  Normal Ranges:  EF-A4C View: 42.1 % (>55%)  EF-A2C View: 38.0 %  EF-Biplane:  41.1 %    LV DIASTOLIC FUNCTION:  Normal Ranges:  MV Peak E:    1.11 m/s    (0.7-1.2 m/s)  MV Peak A:    0.61 m/s    (0.42-0.7 m/s)  E/A Ratio:    1.83        (1.0-2.2)  MV e'         0.07 m/s    (>8.0)  MV lateral e' 0.08 m/s  MV medial e'  0.06 m/s  MV A Dur:     134.00 msec  E/e' Ratio:   15.21       (<8.0)  LV IVRT:      95 msec     (<100ms)    MITRAL VALVE:  Normal Ranges:  MV DT: 239 msec (150-240msec)    MITRAL INSUFFICIENCY:  Normal Ranges:  PISA Radius:  0.6 cm  MR VTI:       181.50 cm  MR Vmax:      521.50 cm/s  MR Alias Emerson: 38.5 cm/s  MR Volume:    35.57 ml  MR Flow Rt:   102.20 ml/s  MR EROA:      0.20 cm2    AORTIC VALVE:  Normal Ranges:  AoV Mean P.0 mmHg (1.7-11.5mmHg)  LVOT Max Emerson:            1.06 m/s (<1.1m/s)  AoV VTI:                 34.30 cm (18-25cm)  LVOT VTI:                24.30 cm  LVOT Diameter:           1.90 cm  (1.8-2.4cm)  AoV Area, VTI:           1.70 cm2 (2.5-5.5cm2)  AoV Dimensionless Index: 0.71    RIGHT  VENTRICLE:  RV 1   3.6 cm  RV 2   2.6 cm  RV 3   5.6 cm  TAPSE: 20.6 mm  RV s'  0.08 m/s    TRICUSPID VALVE/RVSP:  Normal Ranges:  Peak TR Velocity: 2.56 m/s  Est. RA Pressure: 3 mmHg  RV Syst Pressure: 29.2 mmHg (< 30mmHg)      [id number] Nico Pickens DO  Electronically signed on 10/11/2022 at 11:17:59 AM      Wall Scoring         Final     Stress Testing BRANDY([id number]:1:1825):   NM CARDIAC STRESS REST (MYOCARDIAL PERFUSION MIBI) 03/21/2022    Narrative  MRN: [id number]  Patient Name: FAYE CHERY    STUDY:  CARDIAC STRESS/REST INJECTION;  3/21/2022 1:10 pm    INDICATION:  SOB with exertion,fatigue  Z95.1: S/P CABG x 1 R06.02: Shortness of  breath on exertion R53.83: Fatigue.    COMPARISON:  None.    ACCESSION NUMBER(S):  [id number]    ORDERING CLINICIAN:  OSWALDO BERGMAN    TECHNIQUE:  DIVISION OF NUCLEAR MEDICINE  PHARMACOLOGIC STRESS MYOCARDIAL PERFUSION SCAN, ONE DAY PROTOCOL    The patient received an intravenous dose of  10.8 mCi of Tc-99m  tetrofosmin and resting emission tomographic (SPECT) images of the  myocardium were acquired. The patient then received an intravenous  infusion of 0.4mg regadenoson (Lexiscan) followed by an additional  dose of  34.8 mCi of Tc-99m tetrofosmin. Stress phase SPECT images of  the myocardium were then acquired. These included ECG-gated images to  assess and quantify ventricular function.    FINDINGS:  Stress and rest images both demonstrate a medium-sized area of fixed  perfusion defect of the inferolateral segment, severe in intensity.  There is also of medium-sized area of partially reversible perfusion  defect of the anterior segment, moderate in intensity.    ECG-gated images demonstrate normal LV size and mildly reduced  myocardial contractility with an LV ejection fraction of   45 %  (normal above 50 percent). Summed stress score 10 summed rest score 6  summed difference score 5.    Impression  Medium-sized area of fixed perfusion defect of the inferolateral  segment consistent  with myocardial scar or hibernating myocardium in  left circumflex territory. Medium-sized area of partially reversible  perfusion defect of the anterior segment, consistent with mixed  scar/ischemia in LAD territory. Mild left ventricular systolic  dysfunction on post stress gated imaging.    Cardiac Catheterization:   Adult Cath     [facility], Cath Lab  [address] [id number]  Phone [phone] Fax [phone]    Cardiovascular Catheterization Report    Patient Name:     FAYE CHERY Performing Physician: Charu Campa MD  Study Date:       4/21/2022       Verifying Physician:  Charu Campa MD  MRN/PID:          [id number]        Cardiologist:  Accession/Order#: [id number]       Referring Physician:  Charu CAMPA  YOB: 1955      Referring Physician:  Gender:           F               Referring Physician:      Study: Left Heart Catheterization      Indications:  FAYE CHERY is a 66 year old female who presents with hypertension, dyslipidemia, prior percutaneous coronary intervention and prior coronary artery bypass graft surgery. Worsening angina, with a chest pain assessment of typical angina. Study performed as an elective cath procedure.    Medical History:  Stress test performed: Yes. Stress test type: Stress Nuclear. Stress result: Positive. Ischemic Risk: Intermediate. CTA performed: No. [facility] accessed: No. LVEF Assessed: Yes.    Procedure Description:  After infiltration with 2% Lidocaine, the right femoral artery was cannulated with a modified Seldinger technique. Subsequently a 6 [place] sheath was placed in the right femoral artery. Selective coronary catheterization was performed using a 6 Fr catheter(s) exchanged over a guide wire to cannulate the coronary arteries. A JL 4 tip catheter was used for left coronary injections. A JR 4 tip catheter was used for right coronary injections.  Multiple injections of contrast were  made into the left and right coronary arteries with angiograms recorded in multiple projections. After completion of the procedure, femoral artery angiography was performed. This demonstrated a common femoral artery puncture appropriate for closure. An Angio-Seal Evolution 6F ([employer]) vascular closure device was placed per protocol.    Coronary Angiography:  The coronary circulation is right dominant.    Left Main Coronary Artery:  The left main coronary artery is a normal caliber vessel. The left main arises normally from the left coronary sinus of Valsalva and bifurcates into the LAD and circumflex coronary arteries. The left main coronary artery showed no significant disease or stenosis greater than 30%.    Left Anterior Descending Coronary Artery Distribution:  The left anterior descending coronary artery is a normal caliber vessel. The LAD arises normally from the left main coronary artery. The LAD demonstrated severe atherosclerotic disease and a previous patent stent. The proximal left anterior descending coronary artery showed 70% stenosis. This lesion was eccentric.    Circumflex Coronary Artery Distribution:  The circumflex coronary artery is a normal caliber vessel. The circumflex arises normally from the left main coronary artery and terminates in the AV groove. The circumflex revealed mild atherosclerotic disease.    Right Coronary Artery Distribution:    The right coronary artery is a normal caliber vessel. The RCA arises normally from the right sinus of Valsalva. The RCA showed no significant disease or stenosis greater than 30%, mild atherosclerotic disease and diffuse atherosclerotic disease. The proximal right coronary artery showed 40% stenosis. This lesion was eccentric.    Coronary Grafts:    LIMA Graft:  The left internal mammary artery graft conduit originating in situ and attached to the mid left anterior descending is patent.      Valve Findings:  No aortic valve stenosis is  visualized.    Coronary Lesion Summary:  Vessel   Stenosis   Vessel Segment  LAD    70% stenosis    proximal  RCA    40% stenosis    proximal      Graft Stenosis Summary:  Graft     Destination of Graft  LIMA  mid left anterior descending      [facility]emo Personnel:  +------------------+-------------+  Name              Duty           +------------------+-------------+  Liban Campa MD       PROC MD 1  +------------------+-------------+  John Becker RT PROC SCRUB 1  +------------------+-------------+  Mary Zuniga RN    PROC CIRC 1  +------------------+-------------+  Sindy Alvarez RN  PROC RECORD 1  +------------------+-------------+  Misty Gill RN   PROC NURSE 1  +------------------+-------------+      Sedation Time:  +------------------------+----------------------------------------+  Sedation Start/End TimesTime                                      +------------------------+----------------------------------------+  Start                   4/21/2022 09:32:10                        +------------------------+----------------------------------------+  Drugs                   Fentanyl 25 mcg IV per physician for sed  +------------------------+----------------------------------------+  End                     4/21/2022 09:43:14                        +------------------------+----------------------------------------+      Equipment Used:  +--------------------+---------------------------------------------------------+             Date/Time                                              Description  +--------------------+---------------------------------------------------------+  4/21/2022 9:23:53 AM - Capnoflex LF Oral/Nasal CO2 - Qty: 1 Each                                                                    Part #: [id number]  +--------------------+---------------------------------------------------------+  4/21/2022 9:23:56 AM  [employer] Micropuncture Set 4 FR VERA-Bharat                                                       - Qty: 1 Each Part #: 130  +--------------------+---------------------------------------------------------+  4/21/2022 9:24:02 AM Medtronic DXTERITY JR 4.0 6FR X 100cm                                                      - Qty: 1 Each Part #: [id number]  +--------------------+---------------------------------------------------------+  4/21/2022 9:24:11 AM Medtronic DXTERITY JL 4.0 6FR x 100cm                                                      - Qty: 1 Each Part #: [id number]  +--------------------+---------------------------------------------------------+  4/21/2022 9:24:17 AM [employer] Merit Guidewire 3mm J 210                                              cm .035 - Qty: 1 Each Part #: [id number]  +--------------------+---------------------------------------------------------+  4/21/2022 9:24:25 AM  - [place] Sheath 6 FR x 10cm - Qty: 1                                                                Each Part #: 85  +--------------------+---------------------------------------------------------+  4/21/2022 9:44:36 AM  Tan Santiagoseal VIP 6 FR - Qty: 1 Each                                                                    Part #: [id number]  +--------------------+---------------------------------------------------------+      Fluoroscopy Time:  +--------------------------+--------+  X-Ray Summary Fluoro Time:2.40 min  +--------------------------+--------+      +----------+--------+  Contrast: Dose:     +----------+--------+  Omnipaque:65.00 ml  +----------+--------+      Hemodynamic Pressures:    +----+-------------------+---------+------------+-------------+------+---------+  Site     Date Time       Phase    Systolic    Diastolic    ED  Mean mmHg                           Name       mmHg        mmHg      mmHg            +----+-------------------+---------+------------+-------------+------+---------+    AO  4/21/2022 9:39:41 AIR REST          136           69            100                       AM                                                   +----+-------------------+---------+------------+-------------+------+---------+    LV  4/21/2022 9:42:42 AIR REST         169            5    21                                AM                                                   +----+-------------------+---------+------------+-------------+------+---------+   LVp  4/21/2022 9:42:46 AIR REST         148            5    20                                AM                                                   +----+-------------------+---------+------------+-------------+------+---------+   AOp  4/21/2022 9:42:53 AIR REST         137           72             98                       AM                                                   +----+-------------------+---------+------------+-------------+------+---------+        Oxygen Saturation %:  +-----------+------------+  Sample SiteHB (g/100ml)  +-----------+------------+      SYS ART        13.9  +-----------+------------+      SYS JONES        13.9  +-----------+------------+      PUL ART        13.9  +-----------+------------+      PUL JONES        13.9  +-----------+------------+      Interventional Equipment:  +------------------------------------------+  Description                                 +------------------------------------------+  Right common Femoral Angiography Performed  +------------------------------------------+      Complications:  No in-lab complications observed.    Cardiac Cath Transition of Care Summary:  Post Procedure Diagnosis: Single vessel disease and Patent LIMA to LAD.  Blood Loss:               Estimated blood loss during the procedure was 0 mls.  Specimens Removed:        Number of specimen(s) removed: none.      Recommendations:  Maximize medical  therapy.    ____________________________________________________________________________________  CONCLUSIONS:  1. Single vessel coronary artery disease with proximal left anterior descending involvement.  2. Patent LIMA to LAD.  3. Elevated LV filling pressures.  4. Left Ventricular end-diastolic pressure = 20.    ____________________________________________________________________________________  CPT Codes:  Left Heart Cath Bypass Graft w ventriculography and coronary angio(C)-38612; Moderate Sedation Services initial 15 minutes patient >5 years-60792; Moderate Sedation Services 1st additional 15 minutes patient >5 years-99866    ICD 10 Codes:  R94.30-Abnormal result of cardiovascular function study, unspecified; I25.708-Atherosclerosis of coronary artery bypass graft(s), unspecified, with other forms of angina pectoris    [id number] Campa MD  Performing Physician  Electronically signed by Charu Campa MD on 4/21/2022 at 11:35:46 AM      cc Report to: Charu CAMPA           Final   Left Heart Cath, No LV 04/11/2024    [facility], Cath Lab  [address] [place] [address]  Phone [phone] Fax [phone]    Cardiovascular Catheterization Report    Patient Name:      FAYE GODFREYNE     Performing Physician: Charu Campa MD  Study Date:        4/11/2024           Verifying Physician:  Charu Campa MD  MRN/PID:           [id number]            Cardiologist:  Accession#:        [id number]        Ordering Provider:    Charu CAMPA  Date of Birth/Age: 1955 / 68     Fellow:  years  Gender:            F                   Fellow:  Encounter#:        [id number]      Study: Left Heart Cath with Grafts      Indications:  FAYE CHERY is a 68 year old female who presents with prior coronary artery bypass graft surgery. FAYE CHERY is a 68 year old female who presents with dyslipidemia, prior coronary artery bypass graft surgery and a chest pain  assessment of atypical angina. Cardiomyopathy and cardiac arrhythmia. Arrythmia and cardiomyopathy.    Procedure Description:  After infiltration with 2% Lidocaine, the right radial artery was cannulated with a modified Seldinger technique. Subsequently a 6 English sheath was placed in the right radial artery. After infiltration with 2% Lidocaine, a second arterial access was obtained via the right femoral artery with a modified Seldinger technique and a 6 English sheath was placed. Selective coronary catheterization was performed using a 6 Fr catheter(s) exchanged over a guide wire to cannulate the coronary arteries. A 6 Fr Bucky catheter was used for left and right coronary artery injections.  Multiple injections of contrast were made into the left and right coronary arteries with angiograms recorded in multiple projections. After completion of the procedure, the arterial sheath was pulled and a TR Band Radial Compression Device was utilized to obtain patent hemostasis. Femoral artery angiography was performed at the additional arterial access site. This demonstrated a common femoral artery puncture appropriate for closure.    Coronary Angiography:  The coronary circulation is right dominant.    Left Main Coronary Artery:  The left main coronary artery is a normal caliber vessel. The left main arises normally from the left coronary sinus of Valsalva and bifurcates into the LAD and circumflex coronary arteries. The left main coronary artery showed no significant disease or stenosis greater than 30%.    Left Anterior Descending Coronary Artery Distribution:  The left anterior descending coronary artery is a normal caliber vessel. The LAD arises normally from the left main coronary artery. The LAD demonstrated a previous patent stent.    Circumflex Coronary Artery Distribution:  The circumflex coronary artery is a normal caliber vessel. The circumflex arises normally from the left main coronary artery and terminates in  the AV groove. The circumflex revealed a previous patent stent.    Right Coronary Artery Distribution:    The right coronary artery is a normal caliber vessel. The RCA arises normally from the right sinus of Valsalva. The RCA showed moderate atherosclerotic disease. The mid right coronary artery showed 40% stenosis. This lesion was eccentric.    Coronary Grafts:    LIMA Graft:  Left internal mammary artery graft conduit, originating in situ and attached to the mid left anterior descending, is patent. The left internal mammary artery graft conduit originating in situ and attached to the mid left anterior descending is patent.    Coronary Lesion Summary:  Vessel   Stenosis   Vessel Segment  RCA    40% stenosis      mid      Graft Stenosis Summary:  Graft     Destination of Graft  LIMA  mid left anterior descending      Hemo Personnel:  +---------------+---------+  Name           Duty       +---------------+---------+  Liban Campa MDPROC MD 1  +---------------+---------+      Hemodynamic Pressures:    +----+-------------------+---------+------------+-------------+------+---------+  Site     Date Time       Phase    Systolic    Diastolic    ED  Mean mmHg                           Name       mmHg        mmHg      mmHg            +----+-------------------+---------+------------+-------------+------+---------+    LV  4/11/2024 8:54:35 AIR REST         111           -2    13                                AM                                                   +----+-------------------+---------+------------+-------------+------+---------+   LVp  4/11/2024 8:54:41 AIR REST         107           -3    12                                AM                                                   +----+-------------------+---------+------------+-------------+------+---------+   AOp  4/11/2024 8:54:48 AIR REST         108           57             81                       AM                                                    +----+-------------------+---------+------------+-------------+------+---------+    AO  4/11/2024 8:55:00 AIR REST         107           61             82                       AM                                                   +----+-------------------+---------+------------+-------------+------+---------+        Oxygen Saturation %:  +-----------+------------+  Sample SiteHB (g/100ml)  +-----------+------------+      SYS ART        14.7  +-----------+------------+      SYS JONES        14.7  +-----------+------------+      PUL ART        14.7  +-----------+------------+      PUL JONES        14.7  +-----------+------------+      Complications:  No in-lab complications observed.    Cardiac Cath Post Procedure Notes:  Post Procedure Diagnosis: Mild CAD.  Blood Loss:               Estimated blood loss during the procedure was 0 mls.  Specimens Removed:        Number of specimen(s) removed: none.      Recommendations:  Maximize medical therapy.    ____________________________________________________________________________________  CONCLUSIONS:  1. Moderate non-obstructive coronary artery disease.  2. Patent LIMA to LAD.  3. Left Ventricular end-diastolic pressure = 12.  4. Normal LV filling pressures.    ICD 10 Codes:  Ventricular tachycardia, other-I47.29; Atherosclerosis of autologous artery coronary artery bypass graft(s) with unspecified angina pectoris-I25.729    CPT Codes:  Left Heart Cath Bypass Graft w ventriculography and coronary angio([facility])-71589; Moderate Sedation Services initial 15 minutes patient >5 years-17105; Moderate Sedation Services 1st additional 15 minutes patient >5 years-84278    [id number] Liban Campa MD  Performing Physician  Electronically signed by [id number] Liban Campa MD on 4/12/2024 at 8:40:53 AM          ** Final **     Cardiac Scoring: No results found for this or any previous visit from the past 1825  days.    AAA : No results found for this or any previous visit from the past 1825 days.    OTHER: No results found for this or any previous visit from the past 1825 days.    LAST IMAGING RESULTS  XR chest 1 view  Narrative: Interpreted By:  Ashley Alexis,   STUDY:  XR CHEST 1 VIEW;  8/22/2024 9:48 am      INDICATION:  Signs/Symptoms:SOB.      COMPARISON:  07/28/2020      ACCESSION NUMBER(S):  [id number]      ORDERING CLINICIAN:  VINNY PICKETT      FINDINGS:  Multi polar left subclavian pacer/AICD wires remain in place.  Associated port obscures the lateral left mid chest as previously.  Artifact from additional presumed overlying monitoring/support  devices. Limited pulmonary inflation. Perihilar vascular congestion.  Slight blunting of the right costophrenic angle. Cardiac silhouette  is mildly enlarged status post sternotomy.      Impression: Limited pulmonary inflation. Enlarged cardiac silhouette with  perihilar vascular congestion and probable small right pleural  effusion.      MACRO:  None.      Signed by: Ashley Alexis 8/22/2024 10:15 AM  Dictation workstation:   [id number]        Nico Pickens DO, FACC, FACOI  8/22/2024  12:38 PM

## 2024-08-23 LAB
ANION GAP SERPL CALC-SCNC: 9 MMOL/L (ref 10–20)
BUN SERPL-MCNC: 19 MG/DL (ref 6–23)
CALCIUM SERPL-MCNC: 8.3 MG/DL (ref 8.6–10.3)
CHLORIDE SERPL-SCNC: 105 MMOL/L (ref 98–107)
CO2 SERPL-SCNC: 26 MMOL/L (ref 21–32)
CREAT SERPL-MCNC: 1.17 MG/DL (ref 0.5–1.05)
EGFRCR SERPLBLD CKD-EPI 2021: 51 ML/MIN/1.73M*2
ERYTHROCYTE [DISTWIDTH] IN BLOOD BY AUTOMATED COUNT: 13.7 % (ref 11.5–14.5)
EST. AVERAGE GLUCOSE BLD GHB EST-MCNC: 117 MG/DL
GLUCOSE SERPL-MCNC: 90 MG/DL (ref 74–99)
HBA1C MFR BLD: 5.7 %
HCT VFR BLD AUTO: 36.5 % (ref 36–46)
HGB BLD-MCNC: 12.2 G/DL (ref 12–16)
MAGNESIUM SERPL-MCNC: 1.76 MG/DL (ref 1.6–2.4)
MCH RBC QN AUTO: 28.8 PG (ref 26–34)
MCHC RBC AUTO-ENTMCNC: 33.4 G/DL (ref 32–36)
MCV RBC AUTO: 86 FL (ref 80–100)
NRBC BLD-RTO: 0 /100 WBCS (ref 0–0)
PLATELET # BLD AUTO: 210 X10*3/UL (ref 150–450)
POTASSIUM SERPL-SCNC: 3.4 MMOL/L (ref 3.5–5.3)
RBC # BLD AUTO: 4.23 X10*6/UL (ref 4–5.2)
SODIUM SERPL-SCNC: 137 MMOL/L (ref 136–145)
WBC # BLD AUTO: 7.8 X10*3/UL (ref 4.4–11.3)

## 2024-08-23 PROCEDURE — 2500000001 HC RX 250 WO HCPCS SELF ADMINISTERED DRUGS (ALT 637 FOR MEDICARE OP): Performed by: INTERNAL MEDICINE

## 2024-08-23 PROCEDURE — 83735 ASSAY OF MAGNESIUM: CPT | Performed by: NURSE PRACTITIONER

## 2024-08-23 PROCEDURE — 2500000002 HC RX 250 W HCPCS SELF ADMINISTERED DRUGS (ALT 637 FOR MEDICARE OP, ALT 636 FOR OP/ED): Performed by: PHYSICIAN ASSISTANT

## 2024-08-23 PROCEDURE — 2500000001 HC RX 250 WO HCPCS SELF ADMINISTERED DRUGS (ALT 637 FOR MEDICARE OP): Performed by: PHYSICIAN ASSISTANT

## 2024-08-23 PROCEDURE — 2500000004 HC RX 250 GENERAL PHARMACY W/ HCPCS (ALT 636 FOR OP/ED): Performed by: PHYSICIAN ASSISTANT

## 2024-08-23 PROCEDURE — 99233 SBSQ HOSP IP/OBS HIGH 50: CPT | Performed by: PHYSICIAN ASSISTANT

## 2024-08-23 PROCEDURE — 2060000001 HC INTERMEDIATE ICU ROOM DAILY

## 2024-08-23 PROCEDURE — 2500000004 HC RX 250 GENERAL PHARMACY W/ HCPCS (ALT 636 FOR OP/ED): Performed by: STUDENT IN AN ORGANIZED HEALTH CARE EDUCATION/TRAINING PROGRAM

## 2024-08-23 PROCEDURE — 80048 BASIC METABOLIC PNL TOTAL CA: CPT | Performed by: NURSE PRACTITIONER

## 2024-08-23 PROCEDURE — 36415 COLL VENOUS BLD VENIPUNCTURE: CPT | Performed by: NURSE PRACTITIONER

## 2024-08-23 PROCEDURE — 85027 COMPLETE CBC AUTOMATED: CPT | Performed by: NURSE PRACTITIONER

## 2024-08-23 PROCEDURE — 2500000002 HC RX 250 W HCPCS SELF ADMINISTERED DRUGS (ALT 637 FOR MEDICARE OP, ALT 636 FOR OP/ED): Performed by: NURSE PRACTITIONER

## 2024-08-23 PROCEDURE — 99232 SBSQ HOSP IP/OBS MODERATE 35: CPT | Performed by: INTERNAL MEDICINE

## 2024-08-23 RX ORDER — POTASSIUM CHLORIDE 20 MEQ/1
40 TABLET, EXTENDED RELEASE ORAL ONCE
Status: COMPLETED | OUTPATIENT
Start: 2024-08-23 | End: 2024-08-23

## 2024-08-23 RX ORDER — CARVEDILOL 6.25 MG/1
6.25 TABLET ORAL 2 TIMES DAILY
Status: DISCONTINUED | OUTPATIENT
Start: 2024-08-23 | End: 2024-08-24

## 2024-08-23 RX ORDER — AMIODARONE HYDROCHLORIDE 200 MG/1
200 TABLET ORAL DAILY
Status: DISCONTINUED | OUTPATIENT
Start: 2024-09-05 | End: 2024-08-24 | Stop reason: ALTCHOICE

## 2024-08-23 RX ORDER — ACETAMINOPHEN 160 MG/5ML
650 SOLUTION ORAL EVERY 4 HOURS PRN
Status: DISCONTINUED | OUTPATIENT
Start: 2024-08-23 | End: 2024-08-27 | Stop reason: HOSPADM

## 2024-08-23 RX ORDER — ACETAMINOPHEN 650 MG/1
650 SUPPOSITORY RECTAL EVERY 4 HOURS PRN
Status: DISCONTINUED | OUTPATIENT
Start: 2024-08-23 | End: 2024-08-27 | Stop reason: HOSPADM

## 2024-08-23 RX ORDER — ACETAMINOPHEN 325 MG/1
650 TABLET ORAL EVERY 4 HOURS PRN
Status: DISCONTINUED | OUTPATIENT
Start: 2024-08-23 | End: 2024-08-27 | Stop reason: HOSPADM

## 2024-08-23 RX ORDER — AMIODARONE HYDROCHLORIDE 400 MG/1
400 TABLET ORAL 2 TIMES DAILY
Status: DISCONTINUED | OUTPATIENT
Start: 2024-08-23 | End: 2024-08-24 | Stop reason: ALTCHOICE

## 2024-08-23 RX ORDER — MAGNESIUM SULFATE HEPTAHYDRATE 40 MG/ML
2 INJECTION, SOLUTION INTRAVENOUS ONCE
Status: COMPLETED | OUTPATIENT
Start: 2024-08-23 | End: 2024-08-23

## 2024-08-23 ASSESSMENT — ENCOUNTER SYMPTOMS
BACK PAIN: 0
WEAKNESS: 0
CHEST TIGHTNESS: 0
TROUBLE SWALLOWING: 0
SORE THROAT: 0
COUGH: 0
HALLUCINATIONS: 0
FEVER: 0
NUMBNESS: 0
DIAPHORESIS: 0
WOUND: 0
FATIGUE: 0
DIZZINESS: 0
FACIAL SWELLING: 0
CHILLS: 0
APPETITE CHANGE: 0
NAUSEA: 0
WHEEZING: 0
ABDOMINAL PAIN: 0
FREQUENCY: 0
JOINT SWELLING: 0
LIGHT-HEADEDNESS: 0
SHORTNESS OF BREATH: 1
BLOOD IN STOOL: 0
CONSTIPATION: 0
DIARRHEA: 0
HEMATURIA: 0
VOMITING: 0
BRUISES/BLEEDS EASILY: 0
HEADACHES: 0
EYE PAIN: 0
DYSURIA: 0
PALPITATIONS: 0
FLANK PAIN: 0

## 2024-08-23 ASSESSMENT — COGNITIVE AND FUNCTIONAL STATUS - GENERAL
HELP NEEDED FOR BATHING: A LITTLE
WALKING IN HOSPITAL ROOM: A LITTLE
DRESSING REGULAR LOWER BODY CLOTHING: A LITTLE
STANDING UP FROM CHAIR USING ARMS: A LITTLE
CLIMB 3 TO 5 STEPS WITH RAILING: A LITTLE
DAILY ACTIVITIY SCORE: 22
MOBILITY SCORE: 21

## 2024-08-23 ASSESSMENT — PAIN DESCRIPTION - DESCRIPTORS
DESCRIPTORS: ACHING
DESCRIPTORS: ACHING

## 2024-08-23 ASSESSMENT — PAIN - FUNCTIONAL ASSESSMENT
PAIN_FUNCTIONAL_ASSESSMENT: 0-10

## 2024-08-23 ASSESSMENT — ACTIVITIES OF DAILY LIVING (ADL): LACK_OF_TRANSPORTATION: NO

## 2024-08-23 ASSESSMENT — PAIN SCALES - GENERAL
PAINLEVEL_OUTOF10: 7
PAINLEVEL_OUTOF10: 0 - NO PAIN
PAINLEVEL_OUTOF10: 7
PAINLEVEL_OUTOF10: 0 - NO PAIN

## 2024-08-23 ASSESSMENT — PAIN SCALES - PAIN ASSESSMENT IN ADVANCED DEMENTIA (PAINAD): TOTALSCORE: MEDICATION (SEE MAR)

## 2024-08-23 NOTE — CARE PLAN
The patient's goals for the shift include      The clinical goals for the shift include pt. will be free of arrthymias throughout shift      Problem: Pain - Adult  Goal: Verbalizes/displays adequate comfort level or baseline comfort level  Outcome: Progressing     Problem: Safety - Adult  Goal: Free from fall injury  Outcome: Progressing     Problem: Discharge Planning  Goal: Discharge to home or other facility with appropriate resources  Outcome: Progressing     Problem: Chronic Conditions and Co-morbidities  Goal: Patient's chronic conditions and co-morbidity symptoms are monitored and maintained or improved  Outcome: Progressing     Problem: Heart Failure  Goal: Report improvement of dyspnea/breathlessness this shift  Outcome: Progressing     Problem: Arrythmia/Dysrhythmia  Goal: Serial ECG will return to baseline  Outcome: Progressing

## 2024-08-23 NOTE — PROGRESS NOTES
Subjective Data:  68 y.o. female who presented to the emergency department with shortness of breath, lightheadedness, and generalized fatigue.  Patient reports that she has noticed increased shortness of breath which has been worsening for the last week.  This morning after taking her dog outside she noted she was lightheaded and mildly nauseated.  Her shortness of breath was further increased.  She denies any associated palpitations but given her symptoms called EMS.  EMS noted that she was in VT but spontaneously converted back to a paced rhythm.  Patient was given IV amiodarone by EMS.  BMP shows a serum sodium of 133, serum potassium of 4.0, serum creatinine of 1.12.  Serum magnesium was 1.85.  BNP was 876.  Troponin was 11-13.  CBC showed a hemoglobin of 12.0.  TSH done 8/6/2024 was 2.92.  Chest x-ray shows cardiomegaly with perihilar vascular congestion and small right pleural effusion.  Cardiac catheterization done in April 2024 showed moderate nonobstructive CAD with a patent LIMA to LAD.  Echocardiogram done in January 2024 showed mildly reduced left ventricular systolic function with an ejection fraction of 45%, grade 3 diastolic dysfunction, low normal right ventricular systolic function, moderate mitral valve regurgitation, and mild to moderate tricuspid valve regurgitation.  ECG showed AV paced rhythm with heart rate of 73 bpm.  During my exam the patient was resting comfortably in bed.     Overnight Events:    8/22/2024 : feels better. No more NSVT     Objective Data:  Last Recorded Vitals:  Vitals:    08/23/24 0500 08/23/24 0600 08/23/24 0700 08/23/24 0752   BP: 131/78 127/82 124/80    BP Location:       Patient Position:       Pulse: 72 62 60    Resp:       Temp:    36.4 °C (97.5 °F)   TempSrc:       SpO2: 98% 98% 99%    Weight:       Height:           Last Labs:  CBC - 8/23/2024:  6:14 AM  7.8 12.2 210    36.5      CMP - 8/23/2024:  6:14 AM  8.3 6.2 25 --- 1.8   _ 4.2 14 92      PTT - No results in  last year.  _   _ _     TROPHS   Date/Time Value Ref Range Status   08/22/2024 10:50 AM 13 0 - 13 ng/L Final   08/22/2024 09:35 AM 11 0 - 13 ng/L Final     BNP   Date/Time Value Ref Range Status   08/22/2024 09:35  0 - 99 pg/mL Final     HGBA1C   Date/Time Value Ref Range Status   10/26/2018 08:58 AM 5.5 % Final     Comment:          Diagnosis of Diabetes-Adults   Non-Diabetic: < or = 5.6%   Increased risk for developing diabetes: 5.7-6.4%   Diagnostic of diabetes: > or = 6.5%  .       Monitoring of Diabetes                Age (y)     Therapeutic Goal (%)   Adults:          >18           <7.0   Pediatrics:    13-18           <7.5                   7-12           <8.0                   0- 6            7.5-8.5   American Diabetes Association. Diabetes Care 33(S1), Jan 2010.       LDLCALC   Date/Time Value Ref Range Status   02/05/2024 09:59 AM 81 <=99 mg/dL Final     Comment:                                 Near   Borderline      AGE      Desirable  Optimal    High     High     Very High     0-19 Y     0 - 109     ---    110-129   >/= 130     ----    20-24 Y     0 - 119     ---    120-159   >/= 160     ----      >24 Y     0 -  99   100-129  130-159   160-189     >/=190       VLDL   Date/Time Value Ref Range Status   02/05/2024 09:59 AM 43 0 - 40 mg/dL Final   11/10/2022 07:28 AM 15 0 - 40 mg/dL Final   02/15/2022 03:45 PM 22 0 - 40 mg/dL Final   08/13/2021 07:11 AM 23 0 - 40 mg/dL Final      Last I/O:  I/O last 3 completed shifts:  In: 1680 (27.3 mL/kg) [P.O.:1680]  Out: 3900 (63.5 mL/kg) [Urine:3900 (1.8 mL/kg/hr)]  Weight: 61.4 kg     Past Cardiology Tests (Last 3 Years):  EKG:  ECG 12 lead (Clinic Performed) 07/22/2024      ECG 12 Lead 06/25/2024      Electrocardiogram - Day of Discharge 06/14/2024      Electrocardiogram - Post Ablation 06/14/2024      ECG 12 lead (Clinic Performed) 03/25/2024    Echo:  Transthoracic Echo (TTE) Complete 08/22/2024      Transthoracic echo (TTE) complete 01/03/2024    Ejection  Fractions:  EF   Date/Time Value Ref Range Status   08/22/2024 02:29 PM 38 %    01/03/2024 01:33 PM 45       Cath:  Cardiac Catheterization Procedure 04/11/2024    Stress Test:  Nuclear Stress Test 03/21/2022    Cardiac Imaging:  No results found for this or any previous visit from the past 1095 days.      Inpatient Medications:  Scheduled medications   Medication Dose Route Frequency    amiodarone  400 mg oral BID    Followed by    [START ON 9/5/2024] amiodarone  200 mg oral Daily    carvedilol  6.25 mg oral BID    empagliflozin  10 mg oral Daily    enoxaparin  40 mg subcutaneous q24h    ezetimibe  10 mg oral Daily    furosemide  20 mg intravenous q12h    lisinopril  2.5 mg oral Daily    magnesium oxide  400 mg oral Daily    magnesium sulfate  2 g intravenous Once    perflutren lipid microspheres  0.5-10 mL of dilution intravenous Once in imaging    perflutren protein A microsphere  0.5 mL intravenous Once in imaging    polyethylene glycol  17 g oral Daily    spironolactone  12.5 mg oral Daily    sulfur hexafluoride microsphr  2 mL intravenous Once in imaging     PRN medications   Medication    gabapentin    ondansetron ODT    Or    ondansetron     Continuous Medications   Medication Dose Last Rate       Physical Exam:  Constitutional: Cooperative, in no acute distress, alert, appears stated age.  Skin: Skin color, texture, turgor normal. No rashes or lesions.  Head: Normocephalic. No masses, lesions, tenderness or abnormalities  Eyes: Extraocular movements are grossly intact.  Mouth and throat: Mucous membranes moist  Neck: Neck supple, no carotid bruits, no JVD  Respiratory: Rales at bases bilaterally, no use of accessory muscles  Chest wall: Sternal scar, ICD, normal excursion with respiration  Cardiovascular: Regular rhythm with + murmur  Gastrointestinal: Abdomen soft, nontender. Bowel sounds normal.  Musculoskeletal: Strength equal in upper extremities  Extremities: No pitting edema  Neurologic: Sensation  grossly intact, alert and oriented ×3     Assessment/Plan   1.  VT  Patient had an episode of VT noted by EMS for which she received IV amiodarone and magnesium.  The patient has a history of ventricular tachycardia and is underwent 2 prior ablations VT ablation in 2019 and again in June 2024.  Patient also has a BiV ICD in place and is chronically on amiodarone therapy.  Would restart home beta-blocker and amiodarone therapy.  Will have patient evaluated by EP cardiology.  8/23/2024 : EP not available in [place] today  D/W Dr. Macias her own EP  Will reload Amio     2.  HFrEF  Patient presenting with increased shortness of breath found to have elevated BNP of 876 as well as chest x-ray showing perihilar vascular congestion and small pleural effusion.  Patient has been started on IV furosemide for diuresis.  Would continue home dose of spironolactone.  Will add SGLT2 inhibitor.  Continue beta-blocker therapy.  Monitor urine output, renal function, and serum electrolytes while here.  Echocardiogram done in January 2024 showed mildly reduced left ventricular systolic function with an ejection fraction of 45%, grade 3 diastolic dysfunction, low normal right ventricular systolic function, moderate mitral valve regurgitation, and mild to moderate tricuspid valve regurgitation.  8/23/2024 : LVEF worse  Change Metoprolol to Coreg      3.  CAD  The patient has a history of coronary artery disease with prior bypass done in October 2018.  Cardiac catheterization done in April 2024 showed moderate nonobstructive CAD with a patent LIMA to LAD.  This appears stable she denies any chest discomfort.  Troponin was 11-13.  Continue with medical therapy.     4.  Mitral regurgitation  Echocardiogram done in January 2024 showed mildly reduced left ventricular systolic function with an ejection fraction of 45%, grade 3 diastolic dysfunction, low normal right ventricular systolic function, moderate mitral valve regurgitation, and mild to  moderate tricuspid valve regurgitation.      5.  Hypertension  Blood pressure appears well-controlled.  Would continue home antihypertensive medical therapy.     6.  Dyslipidemia  Patient is on Zetia as an outpatient.  Peripheral IV 20 G Right Antecubital (Active)   Site Assessment Clean;Intact;Dry 08/23/24 0400   Dressing Type Transparent 08/23/24 0400   Line Status Saline locked 08/23/24 0400   Dressing Status Dry;Clean;Occlusive 08/23/24 0400   Number of days: 1       Code Status:  Full Code    I spent 30 minutes in the professional and overall care of this patient.        Liban Campa MD

## 2024-08-23 NOTE — PROGRESS NOTES
Social work consult placed for community resources, met with pt and/or family to assess needs and provide support, introduced self and my role as  with care transition team. Pt identified having financial concerns, stated her rent has gone up and she will not be able to afford it, is already connected with [facility] and [facility]. Pt explained DH helped her apply for SNAP benefits, will be getting those starting in October, currently on [employer] waitlist. SW provided pt with the [facility] Community resource guide which she was appreciative, did not identify any other areas of SW support, requested SW contact Becky Renae on her behalf. MARY JANE sent LOUIS Renae a secure chat informing her of pt's request to talk about her financial concerns. No other needs identified at this time, SW signing off,  pt and care team aware of SW availability while inpt.     Tonio Kirk, MSW, LSW ([id number])   Care Transitions

## 2024-08-23 NOTE — PROGRESS NOTES
08/23/24 1058   Discharge Planning   Assistance Needed Independent   Type of Residence Private residence   Home or Post Acute Services None   Expected Discharge Disposition Home   Financial Resource Strain   How hard is it for you to pay for the very basics like food, housing, medical care, and heating? Somewhat   Housing Stability   In the last 12 months, was there a time when you were not able to pay the mortgage or rent on time? N   At any time in the past 12 months, were you homeless or living in a shelter (including now)? N   Transportation Needs   In the past 12 months, has lack of transportation kept you from medical appointments or from getting medications? no   In the past 12 months, has lack of transportation kept you from meetings, work, or from getting things needed for daily living? No     PCP is Yovany Ford MD. Patient is from home alone with her dog and support from her friends. Patient is independent with ambulation, self care, driving, shopping, and meals.  Patient lives in a apartment. She is trying to work with [employer] to obtain affordable housing. She also is in the process of obtaining SNAP benefits. Patient expressed that she has been stressed over her housing and rehoming her dog. Patient is agreeable to SW consult for additional resources. Patient prefers to return home with no needs when medically ready. TCC to follow.

## 2024-08-23 NOTE — PROGRESS NOTES
Shani Watson is a 68 y.o. female on day 0 of admission presenting with Ventricular tachycardia (Multi).      Subjective   Shani Watson is a 68 y.o. female with PMHx s/f CAD prior Cabg, recurrent VTACH s/p ablation ICD pacemaker, Degenerative disk disease HTN, depression presenting with light headedness.  Patient has been feeling somewhat fatigued and weak over past few weeks.  This morning patient was lightheaded short of breath contacted EMS was found to be in ventricular tachycardia patient was given dose of IV amiodarone subsequently converted and route.  In the emergency department on presentation her vital signs showed temperature 97.9 heart rate 75 respiratory rate 16 blood pressure 125/83 SpO2 99% on room air.  Chemistry panel showed sodium 133 potassium 4.0 chloride 102 CO2 25 BUN 12 creatinine 1.12 glucose 174 bilirubin 1.8 other liver enzymes within normal limits troponin 11 repeat troponin 13  TSH 2.92. Chest x-ray showing limited inflation pulmonary edema small right pleural effusion. Echo: EF 35-40%, abnormal wall motion, mod MR/TR    8/23/2024: No acute events overnight. Vitals stable, HR 60, /80. K 3.4- replace. Mg 1.76- supplement to keep >2.0         Review of Systems   Constitutional:  Negative for appetite change, chills, diaphoresis, fatigue and fever.   HENT:  Negative for congestion, ear pain, facial swelling, hearing loss, nosebleeds, sore throat, tinnitus and trouble swallowing.    Eyes:  Negative for pain.   Respiratory:  Positive for shortness of breath. Negative for cough, chest tightness and wheezing.    Cardiovascular:  Negative for chest pain, palpitations and leg swelling.   Gastrointestinal:  Negative for abdominal pain, blood in stool, constipation, diarrhea, nausea and vomiting.   Genitourinary:  Negative for dysuria, flank pain, frequency, hematuria and urgency.   Musculoskeletal:  Negative for back pain and joint swelling.   Skin:  Negative for rash and wound.    Neurological:  Negative for dizziness, syncope, weakness, light-headedness, numbness and headaches.   Hematological:  Does not bruise/bleed easily.   Psychiatric/Behavioral:  Negative for behavioral problems, hallucinations and suicidal ideas.           Objective     Last Recorded Vitals  /73   Pulse 66   Temp 36.4 °C (97.5 °F)   Resp 20   Wt 61.4 kg (135 lb 7.6 oz)   SpO2 96%     Image Results  Transthoracic Echo (TTE) Complete    Result Date: 8/22/2024              [place] [address]      Phone [phone] Fax [phone] TRANSTHORACIC ECHOCARDIOGRAM REPORT Patient Name:      FAYE CHERY      Reading Physician:   [id number] Nico Pickens DO Study Date:        8/22/2024            Ordering Provider:   [id number] NICO PICKENS MRN/PID:           [id number]             Fellow: Accession#:        [id number]         Nurse: Date of Birth/Age: 1955 / 68      Sonographer:         Yesica Hunter RDCS                    years Gender:            F                    Additional Staff: Height:            154.94 cm            Admit Date:          8/22/2024 Weight:            60.78 kg             Admission Status:    Inpatient - Routine BSA / BMI:         1.59 m2 / 25.32      Department Location: [facility]                    kg/m2 Blood Pressure: 142 /97 mmHg Study Type:    TRANSTHORACIC ECHO (TTE) COMPLETE Diagnosis/ICD: Ventricular tachycardia, other-I47.29; Atherosclerosis of                coronary artery bypass graft(s), unspecified, with other forms of                angina pectoris-I25.708; Ischemic cardiomyopathy-I25.5 Indication:    V-tach, ischemic cardiomyopathy CPT Codes:     Echo Complete w Full Doppler-92310; Myocardial Strain                Imaging-[id number]  Study Detail: The following Echo studies were performed: 2D, M-Mode, Doppler,               color flow and Strain. Patient has a pacemaker.  PHYSICIAN INTERPRETATION: Left Ventricle: Left ventricular  ejection fraction is moderately decreased, by visual estimate at 35-40%. Wall motion is abnormal. The left ventricular cavity size is normal. The left ventricular septal wall thickness is mildly increased. Left Ventricular Global Longitudinal Strain - 5.7 %. Spectral Doppler shows a pseudonormal pattern of left ventricular diastolic filling. LV Wall Scoring: The basal and mid anterior wall, basal and mid anterolateral wall, and basal and mid inferolateral wall are akinetic. The apical lateral segment and apical anterior segment are hypokinetic. All remaining scored segments are normal. Left Atrium: The left atrium is moderately dilated. Right Ventricle: The right ventricle is mildly enlarged. There is mildly reduced right ventricular systolic function. A device is visualized in the right ventricle. Right Atrium: The right atrium is mildly dilated. There is a device visualized in the right atrium. Aortic Valve: The aortic valve is trileaflet. The aortic valve dimensionless index is 0.61. There is trace to mild aortic valve regurgitation. The peak instantaneous gradient of the aortic valve is 8.1 mmHg. The mean gradient of the aortic valve is 5.0 mmHg. Mitral Valve: The mitral valve is mildly thickened. There is moderate mitral valve regurgitation. The mitral regurgitant orifice area is 23 mm2. The mitral regurgitant volume is 43.11 ml. Tricuspid Valve: The tricuspid valve is structurally normal. There is moderate tricuspid regurgitation. The Doppler estimated RVSP is moderately elevated at 64.2 mmHg. Pulmonic Valve: The pulmonic valve is structurally normal. There is mild pulmonic valve regurgitation. Pericardium: There is no pericardial effusion noted. Aorta: The aortic root is normal. Systemic Veins: The inferior vena cava appears to be of normal size. There is IVC inspiratory collapse greater than 50%.  CONCLUSIONS:  1. Multiple segmental abnormalities exist. See findings.  2. Left ventricular ejection fraction is  moderately decreased, by visual estimate at 35-40%.  3. Abnormal wall motion.  4. Spectral Doppler shows a pseudonormal pattern of left ventricular diastolic filling.  5. There is mildly reduced right ventricular systolic function.  6. Mildly enlarged right ventricle.  7. The left atrium is moderately dilated.  8. Moderate mitral valve regurgitation.  9. Moderate tricuspid regurgitation. 10. Moderately elevated right ventricular systolic pressure. QUANTITATIVE DATA SUMMARY: 2D MEASUREMENTS:                           Normal Ranges: IVSd:          1.20 cm    (0.6-1.1cm) LVPWd:         1.10 cm    (0.6-1.1cm) LVIDd:         4.70 cm    (3.9-5.9cm) LVIDs:         4.00 cm LV Mass Index: 125.3 g/m2 LV % FS        14.9 % LA VOLUME:                               Normal Ranges: LA Vol A4C:        83.7 ml    (22+/-6mL/m2) LA Vol A2C:        76.4 ml LA Vol BP:         82.6 ml LA Vol Index A4C:  52.5ml/m2 LA Vol Index A2C:  48.0 ml/m2 LA Vol Index BP:   51.9 ml/m2 LA Area A4C:       24.1 cm2 LA Area A2C:       23.8 cm2 LA Major Axis A4C: 5.9 cm LA Major Axis A2C: 6.3 cm LA Volume Index:   47.9 ml/m2 RA VOLUME BY A/L METHOD:                       Normal Ranges: RA Area A4C: 21.6 cm2 M-MODE MEASUREMENTS:                  Normal Ranges: Ao Root: 2.50 cm (2.0-3.7cm) AoV Exc: 1.40 cm (1.5-2.5cm) LAs:     3.90 cm (2.7-4.0cm) AORTA MEASUREMENTS:                      Normal Ranges: AoV Exc:     1.40 cm (1.5-2.5cm) Ao Sinus, d: 2.29 cm (2.1-3.5cm) Ao STJ, d:   2.04 cm (1.7-3.4cm) Asc Ao, d:   3.00 cm (2.1-3.4cm) LV SYSTOLIC FUNCTION BY 2D PLANIMETRY (MOD):                                        Normal Ranges: EF-A4C View:                      40 % (>=55%) EF-A2C View:                      37 % EF-Biplane:                       39 % EF-Visual:                        38 % LV EF Reported:                   38 % Global Longitudinal Strain (GLS): 6 % LV DIASTOLIC FUNCTION:                           Normal Ranges: MV Peak E:    1.44 m/s     (0.7-1.2 m/s) MV Peak A:    0.25 m/s    (0.42-0.7 m/s) E/A Ratio:    5.71        (1.0-2.2) MV e'         0.054 m/s   (>8.0) MV lateral e' 0.06 m/s MV medial e'  0.05 m/s MV A Dur:     120.00 msec E/e' Ratio:   26.84       (<8.0) MITRAL VALVE:                 Normal Ranges: MV DT: 177 msec (150-240msec) MITRAL INSUFFICIENCY:                           Normal Ranges: PISA Radius:  0.7 cm MR VTI:       191.50 cm MR Vmax:      526.50 cm/s MR Alias Emerson: 38.5 cm/s MR Volume:    43.11 ml MR Flow Rt:   118.53 ml/s MR EROA:      23 mm2 AORTIC VALVE:                                   Normal Ranges: AoV Vmax:                1.42 m/s (<=1.7m/s) AoV Peak P.1 mmHg (<20mmHg) AoV Mean P.0 mmHg (1.7-11.5mmHg) LVOT Max Emerson:            1.01 m/s (<=1.1m/s) AoV VTI:                 32.70 cm (18-25cm) LVOT VTI:                20.00 cm LVOT Diameter:           1.80 cm  (1.8-2.4cm) AoV Area, VTI:           1.56 cm2 (2.5-5.5cm2) AoV Area,Vmax:           1.81 cm2 (2.5-4.5cm2) AoV Dimensionless Index: 0.61  RIGHT VENTRICLE: RV Basal 4.20 cm RV Mid   3.10 cm RV Major 7.0 cm TAPSE:   16.5 mm RV s'    0.12 m/s TRICUSPID VALVE/RVSP:                             Normal Ranges: Peak TR Velocity: 3.91 m/s RV Syst Pressure: 64.2 mmHg (< 30mmHg) IVC Diam:         1.72 cm  [id number] Nico Pickens DO Electronically signed on 2024 at 3:07:54 PM  Wall Scoring  ** Final **     XR chest 1 view    Result Date: 2024  Interpreted By:  Ashley Alexis, STUDY: XR CHEST 1 VIEW;  2024 9:48 am   INDICATION: Signs/Symptoms:SOB.   COMPARISON: 2020   ACCESSION NUMBER(S): [id number]   ORDERING CLINICIAN: VINNY PICKETT   FINDINGS: Multi polar left subclavian pacer/AICD wires remain in place. Associated port obscures the lateral left mid chest as previously. Artifact from additional presumed overlying monitoring/support devices. Limited pulmonary inflation. Perihilar vascular congestion. Slight blunting of the right costophrenic  angle. Cardiac silhouette is mildly enlarged status post sternotomy.       Limited pulmonary inflation. Enlarged cardiac silhouette with perihilar vascular congestion and probable small right pleural effusion.   MACRO: None.   Signed by: Ashley Alexis 8/22/2024 10:15 AM Dictation workstation:   [id number]       Lab Results  Results for orders placed or performed during the hospital encounter of 08/22/24 (from the past 24 hour(s))   Troponin I, High Sensitivity   Result Value Ref Range    Troponin I, High Sensitivity 13 0 - 13 ng/L   Transthoracic Echo (TTE) Complete   Result Value Ref Range    LVOT diam 1.80 cm    LV Biplane EF 39 %    MV E/A ratio 5.71     Tricuspid annular plane systolic excursion 1.7 cm    AV mn grad 5.0 mmHg    LA vol index A/L 51.9 ml/m2    AV pk aldo 1.42 m/s    LV EF 38 %    RV free wall pk S' 11.70 cm/s    LV GLS 5.7 %    RVSP 64.2 mmHg    LVIDd 4.70 cm    Aortic Valve Area by Continuity of VTI 1.56 cm2    Aortic Valve Area by Continuity of Peak Velocity 1.81 cm2    AV pk grad 8.1 mmHg    LV A4C EF 39.5    CBC   Result Value Ref Range    WBC 7.8 4.4 - 11.3 x10*3/uL    nRBC 0.0 0.0 - 0.0 /100 WBCs    RBC 4.23 4.00 - 5.20 x10*6/uL    Hemoglobin 12.2 12.0 - 16.0 g/dL    Hematocrit 36.5 36.0 - 46.0 %    MCV 86 80 - 100 fL    MCH 28.8 26.0 - 34.0 pg    MCHC 33.4 32.0 - 36.0 g/dL    RDW 13.7 11.5 - 14.5 %    Platelets 210 150 - 450 x10*3/uL   Basic metabolic panel   Result Value Ref Range    Glucose 90 74 - 99 mg/dL    Sodium 137 136 - 145 mmol/L    Potassium 3.4 (L) 3.5 - 5.3 mmol/L    Chloride 105 98 - 107 mmol/L    Bicarbonate 26 21 - 32 mmol/L    Anion Gap 9 (L) 10 - 20 mmol/L    Urea Nitrogen 19 6 - 23 mg/dL    Creatinine 1.17 (H) 0.50 - 1.05 mg/dL    eGFR 51 (L) >60 mL/min/1.73m*2    Calcium 8.3 (L) 8.6 - 10.3 mg/dL   Magnesium   Result Value Ref Range    Magnesium 1.76 1.60 - 2.40 mg/dL        Medications  Scheduled medications:  amiodarone, 400 mg, oral, BID   Followed by  [START ON  9/5/2024] amiodarone, 200 mg, oral, Daily  carvedilol, 6.25 mg, oral, BID  empagliflozin, 10 mg, oral, Daily  enoxaparin, 40 mg, subcutaneous, q24h  ezetimibe, 10 mg, oral, Daily  furosemide, 20 mg, intravenous, q12h  lisinopril, 2.5 mg, oral, Daily  magnesium oxide, 400 mg, oral, Daily  perflutren lipid microspheres, 0.5-10 mL of dilution, intravenous, Once in imaging  perflutren protein A microsphere, 0.5 mL, intravenous, Once in imaging  polyethylene glycol, 17 g, oral, Daily  spironolactone, 12.5 mg, oral, Daily  sulfur hexafluoride microsphr, 2 mL, intravenous, Once in imaging      Continuous medications:     PRN medications:  PRN medications: gabapentin, ondansetron ODT **OR** ondansetron     Physical Exam  Constitutional:       General: She is not in acute distress.     Appearance: Normal appearance.   HENT:      Head: Normocephalic and atraumatic.      Right Ear: External ear normal.      Left Ear: External ear normal.      Nose: Nose normal.      Mouth/Throat:      Mouth: Mucous membranes are moist.      Pharynx: Oropharynx is clear.   Eyes:      Extraocular Movements: Extraocular movements intact.      Conjunctiva/sclera: Conjunctivae normal.      Pupils: Pupils are equal, round, and reactive to light.   Cardiovascular:      Rate and Rhythm: Normal rate and regular rhythm.      Pulses: Normal pulses.      Heart sounds: Normal heart sounds.   Pulmonary:      Effort: Pulmonary effort is normal. No respiratory distress.      Breath sounds: Normal breath sounds. No wheezing, rhonchi or rales.   Abdominal:      General: Bowel sounds are normal.      Palpations: Abdomen is soft.      Tenderness: There is no abdominal tenderness. There is no right CVA tenderness, left CVA tenderness, guarding or rebound.   Musculoskeletal:         General: No swelling. Normal range of motion.      Cervical back: Normal range of motion and neck supple.   Skin:     General: Skin is warm and dry.      Capillary Refill: Capillary  refill takes less than 2 seconds.      Findings: No lesion or rash.      Comments: Healing incision to left forearm   Neurological:      General: No focal deficit present.      Mental Status: She is alert and oriented to person, place, and time. Mental status is at baseline.   Psychiatric:         Mood and Affect: Mood normal.         Behavior: Behavior normal.                  Assessment/Plan   This patient currently has cardiac telemetry ordered; if you would like to modify or discontinue the telemetry order, click here to go to the orders activity to modify/discontinue the order.  Recurrent ventricular tachycardia, history of V. tach ablation, history of ICD  Metoprolol changed to coreg  Consult cardiology- cards aid D/W Dr. Macias her own EP as we do not have EP available today, reloading amio   Supplement to keep K>4.0 and mg>2.0      Acute on chronic systolic CHF likely secondary to ventricular tachycardia  IV furosemide and home meds  Metoprolol changed to coreg  Echo: EF 35-40%, abnormal wall motion, mod MR/TR     Coronary artery disease history CABG  Patient without any complaint of chest pain  Troponin was 11-13.   We will continue routine cardiac medications    Mitral regurgitation  Echo and cards consult as above    HTN  Continue home medications  Metoprolol changed to coreg as above  Monitor BP and adjust as necessary     DLD  Zetia    Degenerative disc disease  Continue as needed gabapentin     Hyperglycemia  Check hemoglobin A1c    Code Status: Full Code     DVT ppx: Lovenox       Please see orders for more complete plan    Brenda George PA-C

## 2024-08-24 LAB
ANION GAP SERPL CALC-SCNC: 12 MMOL/L (ref 10–20)
ANION GAP SERPL CALC-SCNC: 13 MMOL/L (ref 10–20)
BUN SERPL-MCNC: 20 MG/DL (ref 6–23)
BUN SERPL-MCNC: 21 MG/DL (ref 6–23)
CALCIUM SERPL-MCNC: 9.4 MG/DL (ref 8.6–10.3)
CALCIUM SERPL-MCNC: 9.6 MG/DL (ref 8.6–10.3)
CHLORIDE SERPL-SCNC: 95 MMOL/L (ref 98–107)
CHLORIDE SERPL-SCNC: 95 MMOL/L (ref 98–107)
CO2 SERPL-SCNC: 25 MMOL/L (ref 21–32)
CO2 SERPL-SCNC: 30 MMOL/L (ref 21–32)
CREAT SERPL-MCNC: 1.33 MG/DL (ref 0.5–1.05)
CREAT SERPL-MCNC: 1.5 MG/DL (ref 0.5–1.05)
EGFRCR SERPLBLD CKD-EPI 2021: 38 ML/MIN/1.73M*2
EGFRCR SERPLBLD CKD-EPI 2021: 43 ML/MIN/1.73M*2
ERYTHROCYTE [DISTWIDTH] IN BLOOD BY AUTOMATED COUNT: 13.7 % (ref 11.5–14.5)
GLUCOSE BLD MANUAL STRIP-MCNC: 104 MG/DL (ref 74–99)
GLUCOSE SERPL-MCNC: 106 MG/DL (ref 74–99)
GLUCOSE SERPL-MCNC: 119 MG/DL (ref 74–99)
HCT VFR BLD AUTO: 40.9 % (ref 36–46)
HGB BLD-MCNC: 13.7 G/DL (ref 12–16)
MAGNESIUM SERPL-MCNC: 2.37 MG/DL (ref 1.6–2.4)
MCH RBC QN AUTO: 28.6 PG (ref 26–34)
MCHC RBC AUTO-ENTMCNC: 33.5 G/DL (ref 32–36)
MCV RBC AUTO: 85 FL (ref 80–100)
NRBC BLD-RTO: 0 /100 WBCS (ref 0–0)
PLATELET # BLD AUTO: 222 X10*3/UL (ref 150–450)
POTASSIUM SERPL-SCNC: 4.3 MMOL/L (ref 3.5–5.3)
POTASSIUM SERPL-SCNC: 4.5 MMOL/L (ref 3.5–5.3)
RBC # BLD AUTO: 4.79 X10*6/UL (ref 4–5.2)
SODIUM SERPL-SCNC: 129 MMOL/L (ref 136–145)
SODIUM SERPL-SCNC: 132 MMOL/L (ref 136–145)
WBC # BLD AUTO: 8.3 X10*3/UL (ref 4.4–11.3)

## 2024-08-24 PROCEDURE — 82947 ASSAY GLUCOSE BLOOD QUANT: CPT

## 2024-08-24 PROCEDURE — 2500000002 HC RX 250 W HCPCS SELF ADMINISTERED DRUGS (ALT 637 FOR MEDICARE OP, ALT 636 FOR OP/ED): Performed by: PHYSICIAN ASSISTANT

## 2024-08-24 PROCEDURE — 36415 COLL VENOUS BLD VENIPUNCTURE: CPT | Performed by: STUDENT IN AN ORGANIZED HEALTH CARE EDUCATION/TRAINING PROGRAM

## 2024-08-24 PROCEDURE — 36415 COLL VENOUS BLD VENIPUNCTURE: CPT | Performed by: PHYSICIAN ASSISTANT

## 2024-08-24 PROCEDURE — 2500000004 HC RX 250 GENERAL PHARMACY W/ HCPCS (ALT 636 FOR OP/ED): Performed by: STUDENT IN AN ORGANIZED HEALTH CARE EDUCATION/TRAINING PROGRAM

## 2024-08-24 PROCEDURE — 80048 BASIC METABOLIC PNL TOTAL CA: CPT | Performed by: STUDENT IN AN ORGANIZED HEALTH CARE EDUCATION/TRAINING PROGRAM

## 2024-08-24 PROCEDURE — 99233 SBSQ HOSP IP/OBS HIGH 50: CPT | Performed by: INTERNAL MEDICINE

## 2024-08-24 PROCEDURE — 2500000002 HC RX 250 W HCPCS SELF ADMINISTERED DRUGS (ALT 637 FOR MEDICARE OP, ALT 636 FOR OP/ED)

## 2024-08-24 PROCEDURE — 80048 BASIC METABOLIC PNL TOTAL CA: CPT | Performed by: PHYSICIAN ASSISTANT

## 2024-08-24 PROCEDURE — 2500000001 HC RX 250 WO HCPCS SELF ADMINISTERED DRUGS (ALT 637 FOR MEDICARE OP): Performed by: PHYSICIAN ASSISTANT

## 2024-08-24 PROCEDURE — 2500000004 HC RX 250 GENERAL PHARMACY W/ HCPCS (ALT 636 FOR OP/ED): Performed by: PHYSICIAN ASSISTANT

## 2024-08-24 PROCEDURE — 2500000001 HC RX 250 WO HCPCS SELF ADMINISTERED DRUGS (ALT 637 FOR MEDICARE OP): Performed by: STUDENT IN AN ORGANIZED HEALTH CARE EDUCATION/TRAINING PROGRAM

## 2024-08-24 PROCEDURE — 85027 COMPLETE CBC AUTOMATED: CPT | Performed by: PHYSICIAN ASSISTANT

## 2024-08-24 PROCEDURE — 2500000004 HC RX 250 GENERAL PHARMACY W/ HCPCS (ALT 636 FOR OP/ED)

## 2024-08-24 PROCEDURE — 2020000001 HC ICU ROOM DAILY

## 2024-08-24 PROCEDURE — 99291 CRITICAL CARE FIRST HOUR: CPT | Performed by: STUDENT IN AN ORGANIZED HEALTH CARE EDUCATION/TRAINING PROGRAM

## 2024-08-24 PROCEDURE — 83735 ASSAY OF MAGNESIUM: CPT | Performed by: PHYSICIAN ASSISTANT

## 2024-08-24 RX ORDER — CARVEDILOL 12.5 MG/1
12.5 TABLET ORAL 2 TIMES DAILY
Status: DISCONTINUED | OUTPATIENT
Start: 2024-08-24 | End: 2024-08-27 | Stop reason: HOSPADM

## 2024-08-24 RX ORDER — MEXILETINE HYDROCHLORIDE 150 MG/1
150 CAPSULE ORAL EVERY 8 HOURS SCHEDULED
Status: DISCONTINUED | OUTPATIENT
Start: 2024-08-24 | End: 2024-08-27 | Stop reason: HOSPADM

## 2024-08-24 ASSESSMENT — COGNITIVE AND FUNCTIONAL STATUS - GENERAL
WALKING IN HOSPITAL ROOM: A LITTLE
HELP NEEDED FOR BATHING: A LITTLE
HELP NEEDED FOR BATHING: A LITTLE
CLIMB 3 TO 5 STEPS WITH RAILING: A LITTLE
MOBILITY SCORE: 21
DAILY ACTIVITIY SCORE: 22
DAILY ACTIVITIY SCORE: 24
STANDING UP FROM CHAIR USING ARMS: A LITTLE
MOBILITY SCORE: 21
WALKING IN HOSPITAL ROOM: A LITTLE
DRESSING REGULAR LOWER BODY CLOTHING: A LITTLE
DRESSING REGULAR LOWER BODY CLOTHING: A LITTLE
STANDING UP FROM CHAIR USING ARMS: A LITTLE
MOBILITY SCORE: 24
CLIMB 3 TO 5 STEPS WITH RAILING: A LITTLE
DAILY ACTIVITIY SCORE: 22

## 2024-08-24 ASSESSMENT — PAIN SCALES - GENERAL
PAINLEVEL_OUTOF10: 0 - NO PAIN

## 2024-08-24 ASSESSMENT — PAIN - FUNCTIONAL ASSESSMENT
PAIN_FUNCTIONAL_ASSESSMENT: 0-10

## 2024-08-24 NOTE — PROGRESS NOTES
Shani Watson is a 68 y.o. female on day 1 of admission presenting with Ventricular tachycardia (Multi).      Subjective   Shani Watson is a 68 y.o. female with PMHx s/f CAD prior Cabg, recurrent VTACH s/p ablation ICD pacemaker, Degenerative disk disease HTN, depression presenting with light headedness.  Patient has been feeling somewhat fatigued and weak over past few weeks.  This morning patient was lightheaded short of breath contacted EMS was found to be in ventricular tachycardia patient was given dose of IV amiodarone subsequently converted and route.  In the emergency department on presentation her vital signs showed temperature 97.9 heart rate 75 respiratory rate 16 blood pressure 125/83 SpO2 99% on room air.  Chemistry panel showed sodium 133 potassium 4.0 chloride 102 CO2 25 BUN 12 creatinine 1.12 glucose 174 bilirubin 1.8 other liver enzymes within normal limits troponin 11 repeat troponin 13  TSH 2.92. Chest x-ray showing limited inflation pulmonary edema small right pleural effusion. Echo: EF 35-40%, abnormal wall motion, mod MR/TR     8/23/2024: No acute events overnight. Vitals stable, HR 60, /80. K 3.4- replace. Mg 1.76- supplement to keep >2.0    8/24/2024: no acute events. Patient remained amiodarone drip. HR controlled. She had ICD placement. Need EP follow up       Objective     Last Recorded Vitals  BP 89/57   Pulse 60   Temp 35.9 °C (96.7 °F)   Resp 17   Wt 59.6 kg (131 lb 6.3 oz)   SpO2 (!) 89%   Intake/Output last 3 Shifts:    Intake/Output Summary (Last 24 hours) at 8/24/2024 1354  Last data filed at 8/24/2024 1037  Gross per 24 hour   Intake 274.32 ml   Output --   Net 274.32 ml       Admission Weight  Weight: 61.1 kg (134 lb 12.8 oz) (08/22/24 0926)    Daily Weight  08/24/24 : 59.6 kg (131 lb 6.3 oz)    Image Results  Transthoracic Echo (TTE) [place] [id number]       Phone [phone] Fax  [phone]    TRANSTHORACIC ECHOCARDIOGRAM REPORT    Patient Name:      FAYE CHERY      Reading Physician:   [id number] Nico Pickens DO  Study Date:        8/22/2024            Ordering Provider:   [id number] NICO PICKENS  MRN/PID:           [id number]             Fellow:  Accession#:        [id number]         Nurse:  Date of Birth/Age: 1955 / 68      Sonographer:         Yesica Hunter RDCS                     years  Gender:            F                    Additional Staff:  Height:            154.94 cm            Admit Date:          8/22/2024  Weight:            60.78 kg             Admission Status:    Inpatient - Routine  BSA / BMI:         1.59 m2 / 25.32      Department Location: [facility]                     kg/m2  Blood Pressure: 142 /97 mmHg    Study Type:    TRANSTHORACIC ECHO (TTE) COMPLETE  Diagnosis/ICD: Ventricular tachycardia, other-I47.29; Atherosclerosis of                 coronary artery bypass graft(s), unspecified, with other forms of                 angina pectoris-I25.708; Ischemic cardiomyopathy-I25.5  Indication:    V-tach, ischemic cardiomyopathy  CPT Codes:     Echo Complete w Full Doppler-[id number]; Myocardial Strain                 Imaging-[id number]   Study Detail: The following Echo studies were performed: 2D, M-Mode, Doppler,                color flow and Strain. Patient has a pacemaker.       PHYSICIAN INTERPRETATION:  Left Ventricle: Left ventricular ejection fraction is moderately decreased, by visual estimate at 35-40%. Wall motion is abnormal. The left ventricular cavity size is normal. The left ventricular septal wall thickness is mildly increased. Left Ventricular Global Longitudinal Strain - 5.7 %. Spectral Doppler shows a pseudonormal pattern of left ventricular diastolic filling.  LV Wall Scoring:  The basal and mid anterior wall, basal and mid anterolateral wall, and basal and  mid inferolateral wall are akinetic. The apical lateral segment and apical  anterior segment are  hypokinetic. All remaining scored segments are normal.    Left Atrium: The left atrium is moderately dilated.  Right Ventricle: The right ventricle is mildly enlarged. There is mildly reduced right ventricular systolic function. A device is visualized in the right ventricle.  Right Atrium: The right atrium is mildly dilated. There is a device visualized in the right atrium.  Aortic Valve: The aortic valve is trileaflet. The aortic valve dimensionless index is 0.61. There is trace to mild aortic valve regurgitation. The peak instantaneous gradient of the aortic valve is 8.1 mmHg. The mean gradient of the aortic valve is 5.0 mmHg.  Mitral Valve: The mitral valve is mildly thickened. There is moderate mitral valve regurgitation. The mitral regurgitant orifice area is 23 mm2. The mitral regurgitant volume is 43.11 ml.  Tricuspid Valve: The tricuspid valve is structurally normal. There is moderate tricuspid regurgitation. The Doppler estimated RVSP is moderately elevated at 64.2 mmHg.  Pulmonic Valve: The pulmonic valve is structurally normal. There is mild pulmonic valve regurgitation.  Pericardium: There is no pericardial effusion noted.  Aorta: The aortic root is normal.  Systemic Veins: The inferior vena cava appears to be of normal size. There is IVC inspiratory collapse greater than 50%.       CONCLUSIONS:   1. Multiple segmental abnormalities exist. See findings.   2. Left ventricular ejection fraction is moderately decreased, by visual estimate at 35-40%.   3. Abnormal wall motion.   4. Spectral Doppler shows a pseudonormal pattern of left ventricular diastolic filling.   5. There is mildly reduced right ventricular systolic function.   6. Mildly enlarged right ventricle.   7. The left atrium is moderately dilated.   8. Moderate mitral valve regurgitation.   9. Moderate tricuspid regurgitation.  10. Moderately elevated right ventricular systolic pressure.    QUANTITATIVE DATA SUMMARY:  2D MEASUREMENTS:                             Normal Ranges:  IVSd:          1.20 cm    (0.6-1.1cm)  LVPWd:         1.10 cm    (0.6-1.1cm)  LVIDd:         4.70 cm    (3.9-5.9cm)  LVIDs:         4.00 cm  LV Mass Index: 125.3 g/m2  LV % FS        14.9 %    LA VOLUME:                                Normal Ranges:  LA Vol A4C:        83.7 ml    (22+/-6mL/m2)  LA Vol A2C:        76.4 ml  LA Vol BP:         82.6 ml  LA Vol Index A4C:  52.5ml/m2  LA Vol Index A2C:  48.0 ml/m2  LA Vol Index BP:   51.9 ml/m2  LA Area A4C:       24.1 cm2  LA Area A2C:       23.8 cm2  LA Major Axis A4C: 5.9 cm  LA Major Axis A2C: 6.3 cm  LA Volume Index:   47.9 ml/m2    RA VOLUME BY A/L METHOD:                        Normal Ranges:  RA Area A4C: 21.6 cm2    M-MODE MEASUREMENTS:                   Normal Ranges:  Ao Root: 2.50 cm (2.0-3.7cm)  AoV Exc: 1.40 cm (1.5-2.5cm)  LAs:     3.90 cm (2.7-4.0cm)    AORTA MEASUREMENTS:                       Normal Ranges:  AoV Exc:     1.40 cm (1.5-2.5cm)  Ao Sinus, d: 2.29 cm (2.1-3.5cm)  Ao STJ, d:   2.04 cm (1.7-3.4cm)  Asc Ao, d:   3.00 cm (2.1-3.4cm)    LV SYSTOLIC FUNCTION BY 2D PLANIMETRY (MOD):                                         Normal Ranges:  EF-A4C View:                      40 % (>=55%)  EF-A2C View:                      37 %  EF-Biplane:                       39 %  EF-Visual:                        38 %  LV EF Reported:                   38 %  Global Longitudinal Strain (GLS): 6 %    LV DIASTOLIC FUNCTION:                            Normal Ranges:  MV Peak E:    1.44 m/s    (0.7-1.2 m/s)  MV Peak A:    0.25 m/s    (0.42-0.7 m/s)  E/A Ratio:    5.71        (1.0-2.2)  MV e'         0.054 m/s   (>8.0)  MV lateral e' 0.06 m/s  MV medial e'  0.05 m/s  MV A Dur:     120.00 msec  E/e' Ratio:   26.84       (<8.0)    MITRAL VALVE:                  Normal Ranges:  MV DT: 177 msec (150-240msec)    MITRAL INSUFFICIENCY:                            Normal Ranges:  PISA Radius:  0.7 cm  MR VTI:       191.50 cm  MR Vmax:      526.50  cm/s  MR Alias Emerson: 38.5 cm/s  MR Volume:    43.11 ml  MR Flow Rt:   118.53 ml/s  MR EROA:      23 mm2    AORTIC VALVE:                                    Normal Ranges:  AoV Vmax:                1.42 m/s (<=1.7m/s)  AoV Peak P.1 mmHg (<20mmHg)  AoV Mean P.0 mmHg (1.7-11.5mmHg)  LVOT Max Emerson:            1.01 m/s (<=1.1m/s)  AoV VTI:                 32.70 cm (18-25cm)  LVOT VTI:                20.00 cm  LVOT Diameter:           1.80 cm  (1.8-2.4cm)  AoV Area, VTI:           1.56 cm2 (2.5-5.5cm2)  AoV Area,Vmax:           1.81 cm2 (2.5-4.5cm2)  AoV Dimensionless Index: 0.61       RIGHT VENTRICLE:  RV Basal 4.20 cm  RV Mid   3.10 cm  RV Major 7.0 cm  TAPSE:   16.5 mm  RV s'    0.12 m/s    TRICUSPID VALVE/RVSP:                              Normal Ranges:  Peak TR Velocity: 3.91 m/s  RV Syst Pressure: 64.2 mmHg (< 30mmHg)  IVC Diam:         1.72 cm       [id number] Nico Pickens DO  Electronically signed on 2024 at 3:07:54 PM       Wall Scoring       ** Final **  XR chest 1 view  Narrative: Interpreted By:  Ashley Alexis,   STUDY:  XR CHEST 1 VIEW;  2024 9:48 am      INDICATION:  Signs/Symptoms:SOB.      COMPARISON:  2020      ACCESSION NUMBER(S):  [id number]      ORDERING CLINICIAN:  VINNY PICKETT      FINDINGS:  Multi polar left subclavian pacer/AICD wires remain in place.  Associated port obscures the lateral left mid chest as previously.  Artifact from additional presumed overlying monitoring/support  devices. Limited pulmonary inflation. Perihilar vascular congestion.  Slight blunting of the right costophrenic angle. Cardiac silhouette  is mildly enlarged status post sternotomy.      Impression: Limited pulmonary inflation. Enlarged cardiac silhouette with  perihilar vascular congestion and probable small right pleural  effusion.      MACRO:  None.      Signed by: Ashley Alexis 2024 10:15 AM  Dictation workstation:   [id number]      Physical Exam  Constitutional:        General: She is not in acute distress.     Appearance: Normal appearance.   HENT:      Head: Normocephalic.      Mouth/Throat:      Mouth: Mucous membranes are moist.      Pharynx: Oropharynx is clear.   Eyes:      Pupils: Pupils are equal, round, and reactive to light.   Cardiovascular:      Rate and Rhythm: Normal rate and regular rhythm.      Heart sounds: Normal heart sounds. No murmur heard.     No gallop.   Pulmonary:      Effort: Pulmonary effort is normal.      Breath sounds: Normal breath sounds.   Abdominal:      General: Bowel sounds are normal. There is no distension.      Palpations: Abdomen is soft.      Tenderness: There is no abdominal tenderness.   Musculoskeletal:         General: No swelling. Normal range of motion.      Cervical back: Neck supple. No rigidity.   Skin:     General: Skin is warm and dry.   Neurological:      General: No focal deficit present.      Mental Status: She is alert.      Cranial Nerves: No cranial nerve deficit.      Sensory: No sensory deficit.   Psychiatric:         Mood and Affect: Mood normal.         Behavior: Behavior normal.         Relevant Results             Results for orders placed or performed during the hospital encounter of 08/22/24 (from the past 96 hour(s))   CBC and Auto Differential   Result Value Ref Range    WBC 5.4 4.4 - 11.3 x10*3/uL    nRBC 0.0 0.0 - 0.0 /100 WBCs    RBC 4.14 4.00 - 5.20 x10*6/uL    Hemoglobin 12.0 12.0 - 16.0 g/dL    Hematocrit 36.2 36.0 - 46.0 %    MCV 87 80 - 100 fL    MCH 29.0 26.0 - 34.0 pg    MCHC 33.1 32.0 - 36.0 g/dL    RDW 13.7 11.5 - 14.5 %    Platelets 186 150 - 450 x10*3/uL    Neutrophils % 79.8 40.0 - 80.0 %    Immature Granulocytes %, Automated 0.4 0.0 - 0.9 %    Lymphocytes % 10.1 13.0 - 44.0 %    Monocytes % 5.3 2.0 - 10.0 %    Eosinophils % 3.7 0.0 - 6.0 %    Basophils % 0.7 0.0 - 2.0 %    Neutrophils Absolute 4.33 1.20 - 7.70 x10*3/uL    Immature Granulocytes Absolute, Automated 0.02 0.00 - 0.70 x10*3/uL     Lymphocytes Absolute 0.55 (L) 1.20 - 4.80 x10*3/uL    Monocytes Absolute 0.29 0.10 - 1.00 x10*3/uL    Eosinophils Absolute 0.20 0.00 - 0.70 x10*3/uL    Basophils Absolute 0.04 0.00 - 0.10 x10*3/uL   Comprehensive metabolic panel   Result Value Ref Range    Glucose 174 (H) 74 - 99 mg/dL    Sodium 133 (L) 136 - 145 mmol/L    Potassium 4.0 3.5 - 5.3 mmol/L    Chloride 102 98 - 107 mmol/L    Bicarbonate 25 21 - 32 mmol/L    Anion Gap 10 10 - 20 mmol/L    Urea Nitrogen 12 6 - 23 mg/dL    Creatinine 1.12 (H) 0.50 - 1.05 mg/dL    eGFR 53 (L) >60 mL/min/1.73m*2    Calcium 8.6 8.6 - 10.3 mg/dL    Albumin 4.2 3.4 - 5.0 g/dL    Alkaline Phosphatase 92 33 - 136 U/L    Total Protein 6.2 (L) 6.4 - 8.2 g/dL    AST 25 9 - 39 U/L    Bilirubin, Total 1.8 (H) 0.0 - 1.2 mg/dL    ALT 14 7 - 45 U/L   Magnesium   Result Value Ref Range    Magnesium 1.85 1.60 - 2.40 mg/dL   B-Type Natriuretic Peptide   Result Value Ref Range     (H) 0 - 99 pg/mL   Troponin I, High Sensitivity, Initial   Result Value Ref Range    Troponin I, High Sensitivity 11 0 - 13 ng/L   Hemoglobin A1C   Result Value Ref Range    Hemoglobin A1C 5.7 (H) see below %    Estimated Average Glucose 117 Not Established mg/dL   Troponin I, High Sensitivity   Result Value Ref Range    Troponin I, High Sensitivity 13 0 - 13 ng/L   Transthoracic Echo (TTE) Complete   Result Value Ref Range    LVOT diam 1.80 cm    LV Biplane EF 39 %    MV E/A ratio 5.71     Tricuspid annular plane systolic excursion 1.7 cm    AV mn grad 5.0 mmHg    LA vol index A/L 51.9 ml/m2    AV pk aldo 1.42 m/s    LV EF 38 %    RV free wall pk S' 11.70 cm/s    LV GLS 5.7 %    RVSP 64.2 mmHg    LVIDd 4.70 cm    Aortic Valve Area by Continuity of VTI 1.56 cm2    Aortic Valve Area by Continuity of Peak Velocity 1.81 cm2    AV pk grad 8.1 mmHg    LV A4C EF 39.5    CBC   Result Value Ref Range    WBC 7.8 4.4 - 11.3 x10*3/uL    nRBC 0.0 0.0 - 0.0 /100 WBCs    RBC 4.23 4.00 - 5.20 x10*6/uL    Hemoglobin 12.2  12.0 - 16.0 g/dL    Hematocrit 36.5 36.0 - 46.0 %    MCV 86 80 - 100 fL    MCH 28.8 26.0 - 34.0 pg    MCHC 33.4 32.0 - 36.0 g/dL    RDW 13.7 11.5 - 14.5 %    Platelets 210 150 - 450 x10*3/uL   Basic metabolic panel   Result Value Ref Range    Glucose 90 74 - 99 mg/dL    Sodium 137 136 - 145 mmol/L    Potassium 3.4 (L) 3.5 - 5.3 mmol/L    Chloride 105 98 - 107 mmol/L    Bicarbonate 26 21 - 32 mmol/L    Anion Gap 9 (L) 10 - 20 mmol/L    Urea Nitrogen 19 6 - 23 mg/dL    Creatinine 1.17 (H) 0.50 - 1.05 mg/dL    eGFR 51 (L) >60 mL/min/1.73m*2    Calcium 8.3 (L) 8.6 - 10.3 mg/dL   Magnesium   Result Value Ref Range    Magnesium 1.76 1.60 - 2.40 mg/dL   CBC   Result Value Ref Range    WBC 8.3 4.4 - 11.3 x10*3/uL    nRBC 0.0 0.0 - 0.0 /100 WBCs    RBC 4.79 4.00 - 5.20 x10*6/uL    Hemoglobin 13.7 12.0 - 16.0 g/dL    Hematocrit 40.9 36.0 - 46.0 %    MCV 85 80 - 100 fL    MCH 28.6 26.0 - 34.0 pg    MCHC 33.5 32.0 - 36.0 g/dL    RDW 13.7 11.5 - 14.5 %    Platelets 222 150 - 450 x10*3/uL   Basic Metabolic Panel   Result Value Ref Range    Glucose 106 (H) 74 - 99 mg/dL    Sodium 132 (L) 136 - 145 mmol/L    Potassium 4.5 3.5 - 5.3 mmol/L    Chloride 95 (L) 98 - 107 mmol/L    Bicarbonate 30 21 - 32 mmol/L    Anion Gap 12 10 - 20 mmol/L    Urea Nitrogen 21 6 - 23 mg/dL    Creatinine 1.50 (H) 0.50 - 1.05 mg/dL    eGFR 38 (L) >60 mL/min/1.73m*2    Calcium 9.6 8.6 - 10.3 mg/dL   Magnesium   Result Value Ref Range    Magnesium 2.37 1.60 - 2.40 mg/dL   POCT GLUCOSE   Result Value Ref Range    POCT Glucose 104 (H) 74 - 99 mg/dL   Basic metabolic panel   Result Value Ref Range    Glucose 119 (H) 74 - 99 mg/dL    Sodium 129 (L) 136 - 145 mmol/L    Potassium 4.3 3.5 - 5.3 mmol/L    Chloride 95 (L) 98 - 107 mmol/L    Bicarbonate 25 21 - 32 mmol/L    Anion Gap 13 10 - 20 mmol/L    Urea Nitrogen 20 6 - 23 mg/dL    Creatinine 1.33 (H) 0.50 - 1.05 mg/dL    eGFR 43 (L) >60 mL/min/1.73m*2    Calcium 9.4 8.6 - 10.3 mg/dL     Scheduled  medications  carvedilol, 12.5 mg, oral, BID  empagliflozin, 10 mg, oral, Daily  enoxaparin, 40 mg, subcutaneous, q24h  ezetimibe, 10 mg, oral, Daily  furosemide, 20 mg, intravenous, q12h  lisinopril, 2.5 mg, oral, Daily  magnesium oxide, 400 mg, oral, Daily  perflutren lipid microspheres, 0.5-10 mL of dilution, intravenous, Once in imaging  perflutren protein A microsphere, 0.5 mL, intravenous, Once in imaging  polyethylene glycol, 17 g, oral, Daily  sertraline, 150 mg, oral, Daily  spironolactone, 12.5 mg, oral, Daily  sulfur hexafluoride microsphr, 2 mL, intravenous, Once in imaging      Continuous medications  amiodarone, 0.5-1 mg/min, Last Rate: 0.5 mg/min (08/24/24 1259)      PRN medications  PRN medications: acetaminophen **OR** acetaminophen **OR** acetaminophen, gabapentin, ondansetron ODT **OR** ondansetron    Assessment/Plan   This patient currently has cardiac telemetry ordered; if you would like to modify or discontinue the telemetry order, click here to go to the orders activity to modify/discontinue the order.              Assessment & Plan  Ventricular tachycardia (Multi)    CAD in native artery    Cardiac resynchronization therapy defibrillator (CRT-D) in place    Chronic systolic heart failure (Multi)    Blood glucose abnormal      1. Ventricular tachycardia (Multi)      on Coreg and amiodarone drip, monitor electrolytes      2. 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 S/P CABG x 1      risk modification      4. Ischemic cardiomyopathy  Transthoracic Echo (TTE) Complete    Transthoracic Echo (TTE) Complete      5. Chronic systolic heart failure (Multi)      compensated, continue lasix      6. Cardiac resynchronization therapy defibrillator (CRT-D) in place        7. Benign essential hypertension      lisinopril and Coreg                       Rena Mcallister MD

## 2024-08-24 NOTE — SIGNIFICANT EVENT
[facility] - Shani Watson is a 68 y.o. female with PMHx s/f CAD prior Cabg, recurrent VTACH s/p ablation ICD pacemaker, Degenerative disk disease HTN, depression presenting with light headedness.  Patient has been feeling somewhat fatigued and weak over past few weeks.  This morning patient was lightheaded short of breath contacted EMS was found to be in ventricular tachycardia patient was given dose of IV amiodarone subsequently converted and route.  In the emergency department on presentation her vital signs showed temperature 97.9 heart rate 75 respiratory rate 16 blood pressure 125/83 SpO2 99% on room air.  Chemistry panel showed sodium 133 potassium 4.0 chloride 102 CO2 25 BUN 12 creatinine 1.12 glucose 174 bilirubin 1.8 other liver enzymes within normal limits troponin 11 repeat troponin 13 BN peptide 876 TSH 2.92 CBC with white blood cell count 5.4 hemoglobin 12.0 hematocrit 36.2 platelets 186.  Chest x-ray showing limited inflation pulmonary edema small right pleural effusion.  In the emergency department patient got an additional 400 mg of magnesium sulfate.  The ED provider spoke to cardiology on-call who advised keeping the patient for admission.    Significant event - Rapid response called around 0615 for asymptomatic tachycardia. Pt on examination has no complaints such as chest pain or palpitations. EKG done showing sustained wide-QRS monomorphic V-tach at ~150 bpm. Chart reviewed and it appears this is what she was admitted for along with HFrEF with volume overload. Cardiology note reviewed and it appears is having her amiodarone reloaded currently.  Will transfer pt to ICU and start her on an amiodarone drip with hopes to convert her back to sinus rhythm quickly. Vitals signs otherwise stable with pt in no acute distress currently and no symptoms. Will check stat labs including magnesium, but pt did have labs of K 4.5 and Mag 2.37 yesterday.

## 2024-08-24 NOTE — PROGRESS NOTES
Subjective Data:  Patient denies any complaints.    Overnight Events:    Had recurrence of VT and transferred to ICU   Started on IV amiodarone gtt  Patient was asymptomatic during this time     Objective Data:  Last Recorded Vitals:  Vitals:    08/24/24 0650 08/24/24 0652 08/24/24 0700 08/24/24 0747   BP: 87/75  100/77    BP Location:       Patient Position:       Pulse: (!) 142 62 60    Resp: 14 10 17    Temp:    36.4 °C (97.5 °F)   TempSrc:       SpO2:  98% 99%    Weight:       Height:           Last Labs:  CBC - 8/24/2024:  4:39 AM  8.3 13.7 222    40.9      CMP - 8/24/2024:  4:39 AM  9.6 6.2 25 --- 1.8   _ 4.2 14 92      PTT - No results in last year.  _   _ _     TROPHS   Date/Time Value Ref Range Status   08/22/2024 10:50 AM 13 0 - 13 ng/L Final   08/22/2024 09:35 AM 11 0 - 13 ng/L Final     BNP   Date/Time Value Ref Range Status   08/22/2024 09:35  0 - 99 pg/mL Final     HGBA1C   Date/Time Value Ref Range Status   08/22/2024 09:35 AM 5.7 see below % Final   10/26/2018 08:58 AM 5.5 % Final     Comment:          Diagnosis of Diabetes-Adults   Non-Diabetic: < or = 5.6%   Increased risk for developing diabetes: 5.7-6.4%   Diagnostic of diabetes: > or = 6.5%  .       Monitoring of Diabetes                Age (y)     Therapeutic Goal (%)   Adults:          >18           <7.0   Pediatrics:    13-18           <7.5                   7-12           <8.0                   0- 6            7.5-8.5   American Diabetes Association. Diabetes Care 33(S1), Jan 2010.       LDLCALC   Date/Time Value Ref Range Status   02/05/2024 09:59 AM 81 <=99 mg/dL Final     Comment:                                 Near   Borderline      AGE      Desirable  Optimal    High     High     Very High     0-19 Y     0 - 109     ---    110-129   >/= 130     ----    20-24 Y     0 - 119     ---    120-159   >/= 160     ----      >24 Y     0 -  99   100-129  130-159   160-189     >/=190       VLDL   Date/Time Value Ref Range Status   02/05/2024  09:59 AM 43 0 - 40 mg/dL Final   11/10/2022 07:28 AM 15 0 - 40 mg/dL Final   02/15/2022 03:45 PM 22 0 - 40 mg/dL Final   08/13/2021 07:11 AM 23 0 - 40 mg/dL Final      Last I/O:  I/O last 3 completed shifts:  In: 1970 (33.1 mL/kg) [P.O.:1920; I.V.:50 (0.8 mL/kg)]  Out: 3000 (50.3 mL/kg) [Urine:3000 (1.4 mL/kg/hr)]  Weight: 59.6 kg     Past Cardiology Tests (Last 3 Years):  EKG:  ECG 12 lead (Clinic Performed) 07/22/2024      ECG 12 Lead 06/25/2024      Electrocardiogram - Day of Discharge 06/14/2024      Electrocardiogram - Post Ablation 06/14/2024      ECG 12 lead (Clinic Performed) 03/25/2024    Echo:  Transthoracic Echo (TTE) Complete 08/22/2024      Transthoracic echo (TTE) complete 01/03/2024    Ejection Fractions:  EF   Date/Time Value Ref Range Status   08/22/2024 02:29 PM 38 %    01/03/2024 01:33 PM 45       Cath:  Cardiac Catheterization Procedure 04/11/2024    Stress Test:  Nuclear Stress Test 03/21/2022    Cardiac Imaging:  No results found for this or any previous visit from the past 1095 days.      Inpatient Medications:  Scheduled medications   Medication Dose Route Frequency    carvedilol  12.5 mg oral BID    empagliflozin  10 mg oral Daily    enoxaparin  40 mg subcutaneous q24h    ezetimibe  10 mg oral Daily    furosemide  20 mg intravenous q12h    lisinopril  2.5 mg oral Daily    magnesium oxide  400 mg oral Daily    perflutren lipid microspheres  0.5-10 mL of dilution intravenous Once in imaging    perflutren protein A microsphere  0.5 mL intravenous Once in imaging    polyethylene glycol  17 g oral Daily    sertraline  150 mg oral Daily    spironolactone  12.5 mg oral Daily    sulfur hexafluoride microsphr  2 mL intravenous Once in imaging     PRN medications   Medication    acetaminophen    Or    acetaminophen    Or    acetaminophen    gabapentin    ondansetron ODT    Or    ondansetron     Continuous Medications   Medication Dose Last Rate    amiodarone  0.5-1 mg/min 1 mg/min (08/24/24 0653)        Physical Exam:  Constitutional: Cooperative, in no acute distress, alert, appears stated age.  Skin: Skin color, texture, turgor normal. No rashes or lesions.  Head: Normocephalic. No masses, lesions, tenderness or abnormalities  Eyes: Extraocular movements are grossly intact.  Mouth and throat: Mucous membranes moist  Neck: Neck supple, no carotid bruits, no JVD  Respiratory: CTA bilateral   Chest wall: Sternal scar, ICD, normal excursion with respiration  Cardiovascular: Regular rhythm with + murmur 2/6 holosystolic at apex  Gastrointestinal: Abdomen soft, nontender. Bowel sounds normal.  Musculoskeletal: Strength equal in upper extremities  Extremities: No pitting edema, no cyanosis   Neurologic: Sensation grossly intact, alert and oriented ×3          Assessment/Plan   1.  VT  Patient had an episode of VT noted by EMS for which she received IV amiodarone and magnesium.  The patient has a history of ventricular tachycardia and is underwent 2 prior ablations VT ablation in 2019 and again in June 2024.  Patient also has a BiV ICD in place and is chronically on amiodarone therapy.  Would continue home beta-blocker and uptitrate dose of Coreg to 12.5 mg twice daily  Patient had another episode of VT overnight and was transferred to ICU.  Patient was switched to amiodarone IV.  Will continue IV amiodarone at this time.  .  08/24  No in-house EP available.  Case was discussed with patient's EP provider.  We will also start on mexiletine 150 mg 3 times daily.  No urgent indication for transfer for ablation as inpatient given asymptomatic patient.  I will also request device interrogation.    2.  HFrEF  Patient presenting with increased shortness of breath found to have elevated BNP of 876 as well as chest x-ray showing perihilar vascular congestion and small pleural effusion.  Patient has been started on IV furosemide for diuresis.  Would continue home dose of spironolactone.  Will add SGLT2 inhibitor.  Continue  beta-blocker therapy.  Monitor urine output, renal function, and serum electrolytes while here.  Echocardiogram done in January 2024 showed mildly reduced left ventricular systolic function with an ejection fraction of 45%, grade 3 diastolic dysfunction, low normal right ventricular systolic function, moderate mitral valve regurgitation, and mild to moderate tricuspid valve regurgitation.  8/24/2024 : increase coreg dose       3.  CAD  The patient has a history of coronary artery disease with prior bypass done in October 2018.  Cardiac catheterization done in April 2024 showed moderate nonobstructive CAD with a patent LIMA to LAD.  This appears stable she denies any chest discomfort.  Troponin was 11-13.  Continue with medical therapy.     4.  Mitral regurgitation  Echocardiogram done in January 2024 showed mildly reduced left ventricular systolic function with an ejection fraction of 45%, grade 3 diastolic dysfunction, low normal right ventricular systolic function, moderate mitral valve regurgitation, and mild to moderate tricuspid valve regurgitation.      5.  Hypertension  Blood pressure appears well-controlled.  Would continue home antihypertensive medical therapy.  Hold BP meds for SBP<90 or DBP <60     6.  Dyslipidemia  Patient is on Zetia as an outpatient.  Peripheral IV 20 G Right Antecubital (Active)   Site Assessment Clean;Dry;Intact 08/24/24 0638   Dressing Type Transparent 08/24/24 0638   Line Status Blood return noted;Flushed 08/24/24 0638   Dressing Status Clean;Dry;Occlusive 08/24/24 0638   Number of days: 2       Code Status:  Full Code    I spent 32 minutes in the professional and overall care of this patient.        Oleg Veloz MD

## 2024-08-24 NOTE — CARE PLAN
The patient's goals for the shift include      The clinical goals for the shift include pt. will be free of arrthymias throughout shift

## 2024-08-25 VITALS
SYSTOLIC BLOOD PRESSURE: 94 MMHG | RESPIRATION RATE: 17 BRPM | OXYGEN SATURATION: 98 % | BODY MASS INDEX: 24.81 KG/M2 | HEART RATE: 60 BPM | WEIGHT: 131.39 LBS | DIASTOLIC BLOOD PRESSURE: 62 MMHG | HEIGHT: 61 IN | TEMPERATURE: 97.9 F

## 2024-08-25 LAB
ANION GAP SERPL CALC-SCNC: 12 MMOL/L (ref 10–20)
BUN SERPL-MCNC: 20 MG/DL (ref 6–23)
CALCIUM SERPL-MCNC: 9.5 MG/DL (ref 8.6–10.3)
CHLORIDE SERPL-SCNC: 90 MMOL/L (ref 98–107)
CO2 SERPL-SCNC: 29 MMOL/L (ref 21–32)
CREAT SERPL-MCNC: 1.42 MG/DL (ref 0.5–1.05)
EGFRCR SERPLBLD CKD-EPI 2021: 40 ML/MIN/1.73M*2
ERYTHROCYTE [DISTWIDTH] IN BLOOD BY AUTOMATED COUNT: 13.5 % (ref 11.5–14.5)
GLUCOSE SERPL-MCNC: 121 MG/DL (ref 74–99)
HCT VFR BLD AUTO: 38.4 % (ref 36–46)
HGB BLD-MCNC: 13.3 G/DL (ref 12–16)
MAGNESIUM SERPL-MCNC: 2.2 MG/DL (ref 1.6–2.4)
MCH RBC QN AUTO: 28.9 PG (ref 26–34)
MCHC RBC AUTO-ENTMCNC: 34.6 G/DL (ref 32–36)
MCV RBC AUTO: 83 FL (ref 80–100)
NRBC BLD-RTO: 0 /100 WBCS (ref 0–0)
PLATELET # BLD AUTO: 248 X10*3/UL (ref 150–450)
POTASSIUM SERPL-SCNC: 4.4 MMOL/L (ref 3.5–5.3)
RBC # BLD AUTO: 4.61 X10*6/UL (ref 4–5.2)
SODIUM SERPL-SCNC: 127 MMOL/L (ref 136–145)
WBC # BLD AUTO: 9.4 X10*3/UL (ref 4.4–11.3)

## 2024-08-25 PROCEDURE — 36415 COLL VENOUS BLD VENIPUNCTURE: CPT | Performed by: PHYSICIAN ASSISTANT

## 2024-08-25 PROCEDURE — 2500000004 HC RX 250 GENERAL PHARMACY W/ HCPCS (ALT 636 FOR OP/ED)

## 2024-08-25 PROCEDURE — 2500000004 HC RX 250 GENERAL PHARMACY W/ HCPCS (ALT 636 FOR OP/ED): Performed by: PHYSICIAN ASSISTANT

## 2024-08-25 PROCEDURE — 2500000001 HC RX 250 WO HCPCS SELF ADMINISTERED DRUGS (ALT 637 FOR MEDICARE OP): Performed by: STUDENT IN AN ORGANIZED HEALTH CARE EDUCATION/TRAINING PROGRAM

## 2024-08-25 PROCEDURE — 99233 SBSQ HOSP IP/OBS HIGH 50: CPT | Performed by: INTERNAL MEDICINE

## 2024-08-25 PROCEDURE — 82374 ASSAY BLOOD CARBON DIOXIDE: CPT | Performed by: PHYSICIAN ASSISTANT

## 2024-08-25 PROCEDURE — 2020000001 HC ICU ROOM DAILY

## 2024-08-25 PROCEDURE — 2500000004 HC RX 250 GENERAL PHARMACY W/ HCPCS (ALT 636 FOR OP/ED): Performed by: STUDENT IN AN ORGANIZED HEALTH CARE EDUCATION/TRAINING PROGRAM

## 2024-08-25 PROCEDURE — 99291 CRITICAL CARE FIRST HOUR: CPT | Performed by: STUDENT IN AN ORGANIZED HEALTH CARE EDUCATION/TRAINING PROGRAM

## 2024-08-25 PROCEDURE — 2500000002 HC RX 250 W HCPCS SELF ADMINISTERED DRUGS (ALT 637 FOR MEDICARE OP, ALT 636 FOR OP/ED)

## 2024-08-25 PROCEDURE — 2500000001 HC RX 250 WO HCPCS SELF ADMINISTERED DRUGS (ALT 637 FOR MEDICARE OP): Performed by: PHYSICIAN ASSISTANT

## 2024-08-25 PROCEDURE — 83735 ASSAY OF MAGNESIUM: CPT | Performed by: PHYSICIAN ASSISTANT

## 2024-08-25 PROCEDURE — 85027 COMPLETE CBC AUTOMATED: CPT | Performed by: PHYSICIAN ASSISTANT

## 2024-08-25 PROCEDURE — 2500000002 HC RX 250 W HCPCS SELF ADMINISTERED DRUGS (ALT 637 FOR MEDICARE OP, ALT 636 FOR OP/ED): Performed by: PHYSICIAN ASSISTANT

## 2024-08-25 RX ORDER — FUROSEMIDE 10 MG/ML
INJECTION INTRAMUSCULAR; INTRAVENOUS
Status: COMPLETED
Start: 2024-08-25 | End: 2024-08-25

## 2024-08-25 RX ORDER — AMIODARONE HYDROCHLORIDE 200 MG/1
200 TABLET ORAL DAILY
Status: DISCONTINUED | OUTPATIENT
Start: 2024-09-07 | End: 2024-08-27 | Stop reason: HOSPADM

## 2024-08-25 RX ORDER — FUROSEMIDE 20 MG/1
20 TABLET ORAL
Status: DISCONTINUED | OUTPATIENT
Start: 2024-08-25 | End: 2024-08-27 | Stop reason: HOSPADM

## 2024-08-25 RX ORDER — AMIODARONE HYDROCHLORIDE 400 MG/1
400 TABLET ORAL 2 TIMES DAILY
Status: DISCONTINUED | OUTPATIENT
Start: 2024-08-25 | End: 2024-08-27 | Stop reason: HOSPADM

## 2024-08-25 RX ORDER — CALCIUM CARBONATE 200(500)MG
500 TABLET,CHEWABLE ORAL 4 TIMES DAILY PRN
Status: DISCONTINUED | OUTPATIENT
Start: 2024-08-25 | End: 2024-08-27 | Stop reason: HOSPADM

## 2024-08-25 ASSESSMENT — COGNITIVE AND FUNCTIONAL STATUS - GENERAL
MOBILITY SCORE: 24
DAILY ACTIVITIY SCORE: 24
MOBILITY SCORE: 24

## 2024-08-25 ASSESSMENT — PAIN - FUNCTIONAL ASSESSMENT
PAIN_FUNCTIONAL_ASSESSMENT: 0-10

## 2024-08-25 ASSESSMENT — PAIN SCALES - GENERAL
PAINLEVEL_OUTOF10: 0 - NO PAIN

## 2024-08-25 NOTE — PROGRESS NOTES
Shani Watson is a 68 y.o. female on day 2 of admission presenting with Ventricular tachycardia (Multi).      Subjective   Shani Watson is a 68 y.o. female with PMHx s/f CAD prior Cabg, recurrent VTACH s/p ablation ICD pacemaker, Degenerative disk disease HTN, depression presenting with light headedness.  Patient has been feeling somewhat fatigued and weak over past few weeks.  This morning patient was lightheaded short of breath contacted EMS was found to be in ventricular tachycardia patient was given dose of IV amiodarone subsequently converted and route.  In the emergency department on presentation her vital signs showed temperature 97.9 heart rate 75 respiratory rate 16 blood pressure 125/83 SpO2 99% on room air.  Chemistry panel showed sodium 133 potassium 4.0 chloride 102 CO2 25 BUN 12 creatinine 1.12 glucose 174 bilirubin 1.8 other liver enzymes within normal limits troponin 11 repeat troponin 13  TSH 2.92. Chest x-ray showing limited inflation pulmonary edema small right pleural effusion. Echo: EF 35-40%, abnormal wall motion, mod MR/TR     8/23/2024: No acute events overnight. Vitals stable, HR 60, /80. K 3.4- replace. Mg 1.76- supplement to keep >2.0     8/24/2024: no acute events. Patient remained amiodarone drip. HR controlled. She had ICD placement. Need EP follow up    8/25/2024: no acute events overnight. Cardiology ordered mexiletine yesterday. Need EP follow up tomorrow. Possible switch to oral amiodarone today.       Objective     Last Recorded Vitals  /77   Pulse 62   Temp 36 °C (96.8 °F) (Temporal)   Resp 13   Wt 59.6 kg (131 lb 6.3 oz)   SpO2 98%   Intake/Output last 3 Shifts:    Intake/Output Summary (Last 24 hours) at 8/25/2024 1040  Last data filed at 8/24/2024 2318  Gross per 24 hour   Intake 250.79 ml   Output --   Net 250.79 ml       Admission Weight  Weight: 61.1 kg (134 lb 12.8 oz) (08/22/24 0926)    Daily Weight  08/24/24 : 59.6 kg (131 lb 6.3 oz)    Image  Results  Transthoracic Echo (TTE) Complete                [address] [address]       Phone [phone] Fax [phone]    TRANSTHORACIC ECHOCARDIOGRAM REPORT    Patient Name:      FAYE E VIK      Reading Physician:   [id number] Danielle Pickens DO  Study Date:        8/22/2024            Ordering Provider:   [id number] DANIELLE BARAJAS KESHA  MRN/PID:           [id number]             Fellow:  Accession#:        [id number]         Nurse:  Date of Birth/Age: 1955 / 68      Sonographer:         Yesica Hunter RDCS                     years  Gender:            F                    Additional Staff:  Height:            154.94 cm            Admit Date:          8/22/2024  Weight:            60.78 kg             Admission Status:    Inpatient - Routine  BSA / BMI:         1.59 m2 / 25.32      Department Location: [facility]                     kg/m2  Blood Pressure: 142 /97 mmHg    Study Type:    TRANSTHORACIC ECHO (TTE) COMPLETE  Diagnosis/ICD: Ventricular tachycardia, other-I47.29; Atherosclerosis of                 coronary artery bypass graft(s), unspecified, with other forms of                 angina pectoris-I25.708; Ischemic cardiomyopathy-I25.5  Indication:    V-tach, ischemic cardiomyopathy  CPT Codes:     Echo Complete w Full Doppler-90256; Myocardial Strain                 Imaging-[id number]   Study Detail: The following Echo studies were performed: 2D, M-Mode, Doppler,                color flow and Strain. Patient has a pacemaker.       PHYSICIAN INTERPRETATION:  Left Ventricle: Left ventricular ejection fraction is moderately decreased, by visual estimate at 35-40%. Wall motion is abnormal. The left ventricular cavity size is normal. The left ventricular septal wall thickness is mildly increased. Left Ventricular Global Longitudinal Strain - 5.7 %. Spectral Doppler shows a pseudonormal pattern of left ventricular diastolic filling.  LV Wall Scoring:  The basal and mid anterior wall,  basal and mid anterolateral wall, and basal and  mid inferolateral wall are akinetic. The apical lateral segment and apical  anterior segment are hypokinetic. All remaining scored segments are normal.    Left Atrium: The left atrium is moderately dilated.  Right Ventricle: The right ventricle is mildly enlarged. There is mildly reduced right ventricular systolic function. A device is visualized in the right ventricle.  Right Atrium: The right atrium is mildly dilated. There is a device visualized in the right atrium.  Aortic Valve: The aortic valve is trileaflet. The aortic valve dimensionless index is 0.61. There is trace to mild aortic valve regurgitation. The peak instantaneous gradient of the aortic valve is 8.1 mmHg. The mean gradient of the aortic valve is 5.0 mmHg.  Mitral Valve: The mitral valve is mildly thickened. There is moderate mitral valve regurgitation. The mitral regurgitant orifice area is 23 mm2. The mitral regurgitant volume is 43.11 ml.  Tricuspid Valve: The tricuspid valve is structurally normal. There is moderate tricuspid regurgitation. The Doppler estimated RVSP is moderately elevated at 64.2 mmHg.  Pulmonic Valve: The pulmonic valve is structurally normal. There is mild pulmonic valve regurgitation.  Pericardium: There is no pericardial effusion noted.  Aorta: The aortic root is normal.  Systemic Veins: The inferior vena cava appears to be of normal size. There is IVC inspiratory collapse greater than 50%.       CONCLUSIONS:   1. Multiple segmental abnormalities exist. See findings.   2. Left ventricular ejection fraction is moderately decreased, by visual estimate at 35-40%.   3. Abnormal wall motion.   4. Spectral Doppler shows a pseudonormal pattern of left ventricular diastolic filling.   5. There is mildly reduced right ventricular systolic function.   6. Mildly enlarged right ventricle.   7. The left atrium is moderately dilated.   8. Moderate mitral valve regurgitation.   9. Moderate  tricuspid regurgitation.  10. Moderately elevated right ventricular systolic pressure.    QUANTITATIVE DATA SUMMARY:  2D MEASUREMENTS:                            Normal Ranges:  IVSd:          1.20 cm    (0.6-1.1cm)  LVPWd:         1.10 cm    (0.6-1.1cm)  LVIDd:         4.70 cm    (3.9-5.9cm)  LVIDs:         4.00 cm  LV Mass Index: 125.3 g/m2  LV % FS        14.9 %    LA VOLUME:                                Normal Ranges:  LA Vol A4C:        83.7 ml    (22+/-6mL/m2)  LA Vol A2C:        76.4 ml  LA Vol BP:         82.6 ml  LA Vol Index A4C:  52.5ml/m2  LA Vol Index A2C:  48.0 ml/m2  LA Vol Index BP:   51.9 ml/m2  LA Area A4C:       24.1 cm2  LA Area A2C:       23.8 cm2  LA Major Axis A4C: 5.9 cm  LA Major Axis A2C: 6.3 cm  LA Volume Index:   47.9 ml/m2    RA VOLUME BY A/L METHOD:                        Normal Ranges:  RA Area A4C: 21.6 cm2    M-MODE MEASUREMENTS:                   Normal Ranges:  Ao Root: 2.50 cm (2.0-3.7cm)  AoV Exc: 1.40 cm (1.5-2.5cm)  LAs:     3.90 cm (2.7-4.0cm)    AORTA MEASUREMENTS:                       Normal Ranges:  AoV Exc:     1.40 cm (1.5-2.5cm)  Ao Sinus, d: 2.29 cm (2.1-3.5cm)  Ao STJ, d:   2.04 cm (1.7-3.4cm)  Asc Ao, d:   3.00 cm (2.1-3.4cm)    LV SYSTOLIC FUNCTION BY 2D PLANIMETRY (MOD):                                         Normal Ranges:  EF-A4C View:                      40 % (>=55%)  EF-A2C View:                      37 %  EF-Biplane:                       39 %  EF-Visual:                        38 %  LV EF Reported:                   38 %  Global Longitudinal Strain (GLS): 6 %    LV DIASTOLIC FUNCTION:                            Normal Ranges:  MV Peak E:    1.44 m/s    (0.7-1.2 m/s)  MV Peak A:    0.25 m/s    (0.42-0.7 m/s)  E/A Ratio:    5.71        (1.0-2.2)  MV e'         0.054 m/s   (>8.0)  MV lateral e' 0.06 m/s  MV medial e'  0.05 m/s  MV A Dur:     120.00 msec  E/e' Ratio:   26.84       (<8.0)    MITRAL VALVE:                  Normal Ranges:  MV DT: 177 msec  (150-240msec)    MITRAL INSUFFICIENCY:                            Normal Ranges:  PISA Radius:  0.7 cm  MR VTI:       191.50 cm  MR Vmax:      526.50 cm/s  MR Alias Emerson: 38.5 cm/s  MR Volume:    43.11 ml  MR Flow Rt:   118.53 ml/s  MR EROA:      23 mm2    AORTIC VALVE:                                    Normal Ranges:  AoV Vmax:                1.42 m/s (<=1.7m/s)  AoV Peak P.1 mmHg (<20mmHg)  AoV Mean P.0 mmHg (1.7-11.5mmHg)  LVOT Max Emerson:            1.01 m/s (<=1.1m/s)  AoV VTI:                 32.70 cm (18-25cm)  LVOT VTI:                20.00 cm  LVOT Diameter:           1.80 cm  (1.8-2.4cm)  AoV Area, VTI:           1.56 cm2 (2.5-5.5cm2)  AoV Area,Vmax:           1.81 cm2 (2.5-4.5cm2)  AoV Dimensionless Index: 0.61       RIGHT VENTRICLE:  RV Basal 4.20 cm  RV Mid   3.10 cm  RV Major 7.0 cm  TAPSE:   16.5 mm  RV s'    0.12 m/s    TRICUSPID VALVE/RVSP:                              Normal Ranges:  Peak TR Velocity: 3.91 m/s  RV Syst Pressure: 64.2 mmHg (< 30mmHg)  IVC Diam:         1.72 cm       [id number] Nico Pickens DO  Electronically signed on 2024 at 3:07:54 PM       Wall Scoring       ** Final **  XR chest 1 view  Narrative: Interpreted By:  Ashley Alexis,   STUDY:  XR CHEST 1 VIEW;  2024 9:48 am      INDICATION:  Signs/Symptoms:SOB.      COMPARISON:  2020      ACCESSION NUMBER(S):  [id number]      ORDERING CLINICIAN:  VINNY PICKETT      FINDINGS:  Multi polar left subclavian pacer/AICD wires remain in place.  Associated port obscures the lateral left mid chest as previously.  Artifact from additional presumed overlying monitoring/support  devices. Limited pulmonary inflation. Perihilar vascular congestion.  Slight blunting of the right costophrenic angle. Cardiac silhouette  is mildly enlarged status post sternotomy.      Impression: Limited pulmonary inflation. Enlarged cardiac silhouette with  perihilar vascular congestion and probable small right  pleural  effusion.      MACRO:  None.      Signed by: Ashley Allenalesia 8/22/2024 10:15 AM  Dictation workstation:   [id number]      Physical Exam  Constitutional:       General: She is not in acute distress.     Appearance: Normal appearance.   HENT:      Head: Normocephalic.      Mouth/Throat:      Mouth: Mucous membranes are moist.      Pharynx: Oropharynx is clear.   Eyes:      Pupils: Pupils are equal, round, and reactive to light.   Cardiovascular:      Rate and Rhythm: Normal rate and regular rhythm.      Heart sounds: Normal heart sounds. No murmur heard.     No gallop.   Pulmonary:      Effort: Pulmonary effort is normal.      Breath sounds: Normal breath sounds.   Abdominal:      General: Bowel sounds are normal. There is no distension.      Palpations: Abdomen is soft.      Tenderness: There is no abdominal tenderness.   Musculoskeletal:         General: No swelling. Normal range of motion.      Cervical back: Neck supple. No rigidity.   Skin:     General: Skin is warm and dry.   Neurological:      General: No focal deficit present.      Mental Status: She is alert.      Cranial Nerves: No cranial nerve deficit.      Sensory: No sensory deficit.   Psychiatric:         Mood and Affect: Mood normal.         Behavior: Behavior normal.         Relevant Results             Results for orders placed or performed during the hospital encounter of 08/22/24 (from the past 96 hour(s))   CBC and Auto Differential   Result Value Ref Range    WBC 5.4 4.4 - 11.3 x10*3/uL    nRBC 0.0 0.0 - 0.0 /100 WBCs    RBC 4.14 4.00 - 5.20 x10*6/uL    Hemoglobin 12.0 12.0 - 16.0 g/dL    Hematocrit 36.2 36.0 - 46.0 %    MCV 87 80 - 100 fL    MCH 29.0 26.0 - 34.0 pg    MCHC 33.1 32.0 - 36.0 g/dL    RDW 13.7 11.5 - 14.5 %    Platelets 186 150 - 450 x10*3/uL    Neutrophils % 79.8 40.0 - 80.0 %    Immature Granulocytes %, Automated 0.4 0.0 - 0.9 %    Lymphocytes % 10.1 13.0 - 44.0 %    Monocytes % 5.3 2.0 - 10.0 %    Eosinophils % 3.7 0.0 -  6.0 %    Basophils % 0.7 0.0 - 2.0 %    Neutrophils Absolute 4.33 1.20 - 7.70 x10*3/uL    Immature Granulocytes Absolute, Automated 0.02 0.00 - 0.70 x10*3/uL    Lymphocytes Absolute 0.55 (L) 1.20 - 4.80 x10*3/uL    Monocytes Absolute 0.29 0.10 - 1.00 x10*3/uL    Eosinophils Absolute 0.20 0.00 - 0.70 x10*3/uL    Basophils Absolute 0.04 0.00 - 0.10 x10*3/uL   Comprehensive metabolic panel   Result Value Ref Range    Glucose 174 (H) 74 - 99 mg/dL    Sodium 133 (L) 136 - 145 mmol/L    Potassium 4.0 3.5 - 5.3 mmol/L    Chloride 102 98 - 107 mmol/L    Bicarbonate 25 21 - 32 mmol/L    Anion Gap 10 10 - 20 mmol/L    Urea Nitrogen 12 6 - 23 mg/dL    Creatinine 1.12 (H) 0.50 - 1.05 mg/dL    eGFR 53 (L) >60 mL/min/1.73m*2    Calcium 8.6 8.6 - 10.3 mg/dL    Albumin 4.2 3.4 - 5.0 g/dL    Alkaline Phosphatase 92 33 - 136 U/L    Total Protein 6.2 (L) 6.4 - 8.2 g/dL    AST 25 9 - 39 U/L    Bilirubin, Total 1.8 (H) 0.0 - 1.2 mg/dL    ALT 14 7 - 45 U/L   Magnesium   Result Value Ref Range    Magnesium 1.85 1.60 - 2.40 mg/dL   B-Type Natriuretic Peptide   Result Value Ref Range     (H) 0 - 99 pg/mL   Troponin I, High Sensitivity, Initial   Result Value Ref Range    Troponin I, High Sensitivity 11 0 - 13 ng/L   Hemoglobin A1C   Result Value Ref Range    Hemoglobin A1C 5.7 (H) see below %    Estimated Average Glucose 117 Not Established mg/dL   Troponin I, High Sensitivity   Result Value Ref Range    Troponin I, High Sensitivity 13 0 - 13 ng/L   Transthoracic Echo (TTE) Complete   Result Value Ref Range    LVOT diam 1.80 cm    LV Biplane EF 39 %    MV E/A ratio 5.71     Tricuspid annular plane systolic excursion 1.7 cm    AV mn grad 5.0 mmHg    LA vol index A/L 51.9 ml/m2    AV pk aldo 1.42 m/s    LV EF 38 %    RV free wall pk S' 11.70 cm/s    LV GLS 5.7 %    RVSP 64.2 mmHg    LVIDd 4.70 cm    Aortic Valve Area by Continuity of VTI 1.56 cm2    Aortic Valve Area by Continuity of Peak Velocity 1.81 cm2    AV pk grad 8.1 mmHg    LV  A4C EF 39.5    CBC   Result Value Ref Range    WBC 7.8 4.4 - 11.3 x10*3/uL    nRBC 0.0 0.0 - 0.0 /100 WBCs    RBC 4.23 4.00 - 5.20 x10*6/uL    Hemoglobin 12.2 12.0 - 16.0 g/dL    Hematocrit 36.5 36.0 - 46.0 %    MCV 86 80 - 100 fL    MCH 28.8 26.0 - 34.0 pg    MCHC 33.4 32.0 - 36.0 g/dL    RDW 13.7 11.5 - 14.5 %    Platelets 210 150 - 450 x10*3/uL   Basic metabolic panel   Result Value Ref Range    Glucose 90 74 - 99 mg/dL    Sodium 137 136 - 145 mmol/L    Potassium 3.4 (L) 3.5 - 5.3 mmol/L    Chloride 105 98 - 107 mmol/L    Bicarbonate 26 21 - 32 mmol/L    Anion Gap 9 (L) 10 - 20 mmol/L    Urea Nitrogen 19 6 - 23 mg/dL    Creatinine 1.17 (H) 0.50 - 1.05 mg/dL    eGFR 51 (L) >60 mL/min/1.73m*2    Calcium 8.3 (L) 8.6 - 10.3 mg/dL   Magnesium   Result Value Ref Range    Magnesium 1.76 1.60 - 2.40 mg/dL   CBC   Result Value Ref Range    WBC 8.3 4.4 - 11.3 x10*3/uL    nRBC 0.0 0.0 - 0.0 /100 WBCs    RBC 4.79 4.00 - 5.20 x10*6/uL    Hemoglobin 13.7 12.0 - 16.0 g/dL    Hematocrit 40.9 36.0 - 46.0 %    MCV 85 80 - 100 fL    MCH 28.6 26.0 - 34.0 pg    MCHC 33.5 32.0 - 36.0 g/dL    RDW 13.7 11.5 - 14.5 %    Platelets 222 150 - 450 x10*3/uL   Basic Metabolic Panel   Result Value Ref Range    Glucose 106 (H) 74 - 99 mg/dL    Sodium 132 (L) 136 - 145 mmol/L    Potassium 4.5 3.5 - 5.3 mmol/L    Chloride 95 (L) 98 - 107 mmol/L    Bicarbonate 30 21 - 32 mmol/L    Anion Gap 12 10 - 20 mmol/L    Urea Nitrogen 21 6 - 23 mg/dL    Creatinine 1.50 (H) 0.50 - 1.05 mg/dL    eGFR 38 (L) >60 mL/min/1.73m*2    Calcium 9.6 8.6 - 10.3 mg/dL   Magnesium   Result Value Ref Range    Magnesium 2.37 1.60 - 2.40 mg/dL   POCT GLUCOSE   Result Value Ref Range    POCT Glucose 104 (H) 74 - 99 mg/dL   Basic metabolic panel   Result Value Ref Range    Glucose 119 (H) 74 - 99 mg/dL    Sodium 129 (L) 136 - 145 mmol/L    Potassium 4.3 3.5 - 5.3 mmol/L    Chloride 95 (L) 98 - 107 mmol/L    Bicarbonate 25 21 - 32 mmol/L    Anion Gap 13 10 - 20 mmol/L    Urea  Nitrogen 20 6 - 23 mg/dL    Creatinine 1.33 (H) 0.50 - 1.05 mg/dL    eGFR 43 (L) >60 mL/min/1.73m*2    Calcium 9.4 8.6 - 10.3 mg/dL   CBC   Result Value Ref Range    WBC 9.4 4.4 - 11.3 x10*3/uL    nRBC 0.0 0.0 - 0.0 /100 WBCs    RBC 4.61 4.00 - 5.20 x10*6/uL    Hemoglobin 13.3 12.0 - 16.0 g/dL    Hematocrit 38.4 36.0 - 46.0 %    MCV 83 80 - 100 fL    MCH 28.9 26.0 - 34.0 pg    MCHC 34.6 32.0 - 36.0 g/dL    RDW 13.5 11.5 - 14.5 %    Platelets 248 150 - 450 x10*3/uL   Basic Metabolic Panel   Result Value Ref Range    Glucose 121 (H) 74 - 99 mg/dL    Sodium 127 (L) 136 - 145 mmol/L    Potassium 4.4 3.5 - 5.3 mmol/L    Chloride 90 (L) 98 - 107 mmol/L    Bicarbonate 29 21 - 32 mmol/L    Anion Gap 12 10 - 20 mmol/L    Urea Nitrogen 20 6 - 23 mg/dL    Creatinine 1.42 (H) 0.50 - 1.05 mg/dL    eGFR 40 (L) >60 mL/min/1.73m*2    Calcium 9.5 8.6 - 10.3 mg/dL   Magnesium   Result Value Ref Range    Magnesium 2.20 1.60 - 2.40 mg/dL     Scheduled medications  carvedilol, 12.5 mg, oral, BID  empagliflozin, 10 mg, oral, Daily  enoxaparin, 40 mg, subcutaneous, q24h  ezetimibe, 10 mg, oral, Daily  furosemide, 20 mg, intravenous, q12h  lisinopril, 2.5 mg, oral, Daily  magnesium oxide, 400 mg, oral, Daily  mexiletine, 150 mg, oral, q8h ANGELIKA  perflutren lipid microspheres, 0.5-10 mL of dilution, intravenous, Once in imaging  perflutren protein A microsphere, 0.5 mL, intravenous, Once in imaging  polyethylene glycol, 17 g, oral, Daily  sertraline, 150 mg, oral, Daily  spironolactone, 12.5 mg, oral, Daily  sulfur hexafluoride microsphr, 2 mL, intravenous, Once in imaging      Continuous medications  amiodarone, 0.5-1 mg/min, Last Rate: 0.5 mg/min (08/25/24 0546)      PRN medications  PRN medications: acetaminophen **OR** acetaminophen **OR** acetaminophen, calcium carbonate, gabapentin, ondansetron ODT **OR** ondansetron    Assessment/Plan   This patient currently has cardiac telemetry ordered; if you would like to modify or discontinue the  telemetry order, click here to go to the orders activity to modify/discontinue the order.              Assessment & Plan  Ventricular tachycardia (Multi)    CAD in native artery    Cardiac resynchronization therapy defibrillator (CRT-D) in place    Chronic systolic heart failure (Multi)    Blood glucose abnormal      1. Ventricular tachycardia (Multi)      Add mexiletine yesterday, continue Coreg and amiodarone drip, monitor electrolytes. may switch to oral amiodarone. need EP follow up.      2. 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 S/P CABG x 1      risk modification      4. Ischemic cardiomyopathy  Transthoracic Echo (TTE) Complete    Transthoracic Echo (TTE) Complete      5. Chronic systolic heart failure (Multi)      compensated, continue lasix      6. Cardiac resynchronization therapy defibrillator (CRT-D) in place        7. Benign essential hypertension      lisinopril and Coreg      8. V-tach (Multi)  Cardiac Device Check - Inpatient    Cardiac Device Check - Inpatient                      Rena Mcallister MD

## 2024-08-25 NOTE — PROGRESS NOTES
Subjective Data:  Denies any new complains today. Feels well.     Overnight Events:    No arrhythmia noted overnight.  Device interrogation was reviewed.  VT was not captured on her device interrogation.  VT detection zone was turned off on the device and this was slow VT so we could not capture this on device.  Nevertheless no further arrhythmias noted on telemetry monitor.  Case discussed with EP team.       Objective Data:  Last Recorded Vitals:  Vitals:    08/25/24 0900 08/25/24 1000 08/25/24 1100 08/25/24 1200   BP: 116/90 98/64 98/66 97/63   BP Location:       Pulse: 63 68 61 60   Resp: 24 22 19 12   Temp:       TempSrc:       SpO2:    98%   Weight:       Height:           Last Labs:  CBC - 8/25/2024:  4:03 AM  9.4 13.3 248    38.4      CMP - 8/25/2024:  4:03 AM  9.5 6.2 25 --- 1.8   _ 4.2 14 92      PTT - No results in last year.  _   _ _     TROPHS   Date/Time Value Ref Range Status   08/22/2024 10:50 AM 13 0 - 13 ng/L Final   08/22/2024 09:35 AM 11 0 - 13 ng/L Final     BNP   Date/Time Value Ref Range Status   08/22/2024 09:35  0 - 99 pg/mL Final     HGBA1C   Date/Time Value Ref Range Status   08/22/2024 09:35 AM 5.7 see below % Final   10/26/2018 08:58 AM 5.5 % Final     Comment:          Diagnosis of Diabetes-Adults   Non-Diabetic: < or = 5.6%   Increased risk for developing diabetes: 5.7-6.4%   Diagnostic of diabetes: > or = 6.5%  .       Monitoring of Diabetes                Age (y)     Therapeutic Goal (%)   Adults:          >18           <7.0   Pediatrics:    13-18           <7.5                   7-12           <8.0                   0- 6            7.5-8.5   American Diabetes Association. Diabetes Care 33(S1), Jan 2010.       LDLCALC   Date/Time Value Ref Range Status   02/05/2024 09:59 AM 81 <=99 mg/dL Final     Comment:                                 Near   Borderline      AGE      Desirable  Optimal    High     High     Very High     0-19 Y     0 - 109     ---    110-129   >/= 130      ----    20-24 Y     0 - 119     ---    120-159   >/= 160     ----      >24 Y     0 -  99   100-129  130-159   160-189     >/=190       VLDL   Date/Time Value Ref Range Status   02/05/2024 09:59 AM 43 0 - 40 mg/dL Final   11/10/2022 07:28 AM 15 0 - 40 mg/dL Final   02/15/2022 03:45 PM 22 0 - 40 mg/dL Final   08/13/2021 07:11 AM 23 0 - 40 mg/dL Final      Last I/O:  I/O last 3 completed shifts:  In: 525.1 (8.8 mL/kg) [I.V.:525.1 (8.8 mL/kg)]  Out: - (0 mL/kg)   Weight: 59.6 kg     Past Cardiology Tests (Last 3 Years):  EKG:  ECG 12 lead (Clinic Performed) 07/22/2024      ECG 12 Lead 06/25/2024      Electrocardiogram - Day of Discharge 06/14/2024      Electrocardiogram - Post Ablation 06/14/2024      ECG 12 lead (Clinic Performed) 03/25/2024    Echo:  Transthoracic Echo (TTE) Complete 08/22/2024      Transthoracic echo (TTE) complete 01/03/2024    Ejection Fractions:  EF   Date/Time Value Ref Range Status   08/22/2024 02:29 PM 38 %    01/03/2024 01:33 PM 45       Cath:  Cardiac Catheterization Procedure 04/11/2024    Stress Test:  Nuclear Stress Test 03/21/2022    Cardiac Imaging:  No results found for this or any previous visit from the past 1095 days.      Inpatient Medications:  Scheduled medications   Medication Dose Route Frequency    amiodarone  400 mg oral BID    Followed by    [START ON 9/7/2024] amiodarone  200 mg oral Daily    carvedilol  12.5 mg oral BID    empagliflozin  10 mg oral Daily    enoxaparin  40 mg subcutaneous q24h    ezetimibe  10 mg oral Daily    furosemide  20 mg oral BID    lisinopril  2.5 mg oral Daily    magnesium oxide  400 mg oral Daily    mexiletine  150 mg oral q8h [facility]    perflutren lipid microspheres  0.5-10 mL of dilution intravenous Once in imaging    perflutren protein A microsphere  0.5 mL intravenous Once in imaging    polyethylene glycol  17 g oral Daily    sertraline  150 mg oral Daily    spironolactone  12.5 mg oral Daily    sulfur hexafluoride microsphr  2 mL intravenous Once  in imaging     PRN medications   Medication    acetaminophen    Or    acetaminophen    Or    acetaminophen    calcium carbonate    gabapentin    ondansetron ODT    Or    ondansetron     Continuous Medications   Medication Dose Last Rate       Physical Exam:  Constitutional: Cooperative, in no acute distress, alert, appears stated age.  Skin: Skin color, texture, turgor normal. No rashes or lesions.  Head: Normocephalic. No masses, lesions, tenderness or abnormalities  Eyes: Extraocular movements are grossly intact.  Mouth and throat: Mucous membranes moist  Neck: Neck supple, no carotid bruits, no JVD  Respiratory: CTA bilateral   Chest wall: Sternal scar, ICD, normal excursion with respiration  Cardiovascular: Regular rhythm with + murmur 2/6 holosystolic at apex  Gastrointestinal: Abdomen soft, nontender. Bowel sounds normal.  Musculoskeletal: Strength equal in upper extremities  Extremities: No pitting edema, no cyanosis   Neurologic: Sensation grossly intact, alert and oriented ×3           Assessment/Plan   1.  VT  Patient had an episode of VT noted by EMS for which she received IV amiodarone and magnesium.  The patient has a history of ventricular tachycardia and is underwent 2 prior ablations VT ablation in 2019 and again in June 2024.  Patient also has a BiV ICD in place and is chronically on amiodarone therapy.  Would continue home beta-blocker and uptitrate dose of Coreg to 12.5 mg twice daily  Patient had another episode of VT overnight and was transferred to ICU.  Patient was switched to amiodarone IV.  Will continue IV amiodarone at this time.  .  08/24  No in-house EP available.  Case was discussed with patient's EP provider.  We will also start on mexiletine 150 mg 3 times daily.  No urgent indication for transfer for ablation as inpatient given asymptomatic patient.  I will also request device interrogation.    08/25/2024- No further episode of VT on monitor. Patient on mexiletine as per EP recs.  Will switch to oral amiodarone. Device interrogation was reviewed.  VT was not captured on her device interrogation.  VT detection zone was turned off on the device and this was slow VT so we could not capture this on device. No arrhythmia in Vfibb zone were noted. Discussed with EP.      2.  HFrEF  Patient presenting with increased shortness of breath found to have elevated BNP of 876 as well as chest x-ray showing perihilar vascular congestion and small pleural effusion.  Patient has been started on IV furosemide for diuresis.  Would continue home dose of spironolactone.  Will add SGLT2 inhibitor.  Continue beta-blocker therapy.  Monitor urine output, renal function, and serum electrolytes while here.  Echocardiogram done in January 2024 showed mildly reduced left ventricular systolic function with an ejection fraction of 45%, grade 3 diastolic dysfunction, low normal right ventricular systolic function, moderate mitral valve regurgitation, and mild to moderate tricuspid valve regurgitation.  8/24/2024 : increase coreg dose  08/25/2024- Will switch to oral lasix. Continue other medical therapy, appears compensated one exam.         3.  CAD  The patient has a history of coronary artery disease with prior bypass done in October 2018.  Cardiac catheterization done in April 2024 showed moderate nonobstructive CAD with a patent LIMA to LAD.  This appears stable she denies any chest discomfort.  Troponin was 11-13.  Continue with medical therapy.     4.  Mitral regurgitation  Echocardiogram done in January 2024 showed mildly reduced left ventricular systolic function with an ejection fraction of 45%, grade 3 diastolic dysfunction, low normal right ventricular systolic function, moderate mitral valve regurgitation, and mild to moderate tricuspid valve regurgitation.      5.  Hypertension  Blood pressure appears well-controlled.  Would continue home antihypertensive medical therapy.  Hold BP meds for SBP<90 or DBP <60      6.  Dyslipidemia  Patient is on Zetia as an outpatient.    Code Status:  Full Code    I spent 31 minutes in the professional and overall care of this patient.        Oleg Veloz MD

## 2024-08-26 LAB
ANION GAP SERPL CALC-SCNC: 13 MMOL/L (ref 10–20)
BUN SERPL-MCNC: 18 MG/DL (ref 6–23)
CALCIUM SERPL-MCNC: 9.2 MG/DL (ref 8.6–10.3)
CHLORIDE SERPL-SCNC: 89 MMOL/L (ref 98–107)
CO2 SERPL-SCNC: 27 MMOL/L (ref 21–32)
CREAT SERPL-MCNC: 1.43 MG/DL (ref 0.5–1.05)
CREAT UR-MCNC: 19.7 MG/DL (ref 20–320)
EGFRCR SERPLBLD CKD-EPI 2021: 40 ML/MIN/1.73M*2
ERYTHROCYTE [DISTWIDTH] IN BLOOD BY AUTOMATED COUNT: 13.2 % (ref 11.5–14.5)
GLUCOSE SERPL-MCNC: 100 MG/DL (ref 74–99)
HCT VFR BLD AUTO: 39.3 % (ref 36–46)
HGB BLD-MCNC: 13.7 G/DL (ref 12–16)
MAGNESIUM SERPL-MCNC: 2.04 MG/DL (ref 1.6–2.4)
MCH RBC QN AUTO: 29 PG (ref 26–34)
MCHC RBC AUTO-ENTMCNC: 34.9 G/DL (ref 32–36)
MCV RBC AUTO: 83 FL (ref 80–100)
NRBC BLD-RTO: 0 /100 WBCS (ref 0–0)
PLATELET # BLD AUTO: 204 X10*3/UL (ref 150–450)
POTASSIUM SERPL-SCNC: 3.6 MMOL/L (ref 3.5–5.3)
RBC # BLD AUTO: 4.72 X10*6/UL (ref 4–5.2)
SODIUM SERPL-SCNC: 125 MMOL/L (ref 136–145)
SODIUM UR-SCNC: 21 MMOL/L
SODIUM/CREAT UR-RTO: 107 MMOL/G CREAT
WBC # BLD AUTO: 7.5 X10*3/UL (ref 4.4–11.3)

## 2024-08-26 PROCEDURE — 80048 BASIC METABOLIC PNL TOTAL CA: CPT | Performed by: PHYSICIAN ASSISTANT

## 2024-08-26 PROCEDURE — 2500000001 HC RX 250 WO HCPCS SELF ADMINISTERED DRUGS (ALT 637 FOR MEDICARE OP): Performed by: STUDENT IN AN ORGANIZED HEALTH CARE EDUCATION/TRAINING PROGRAM

## 2024-08-26 PROCEDURE — 2500000002 HC RX 250 W HCPCS SELF ADMINISTERED DRUGS (ALT 637 FOR MEDICARE OP, ALT 636 FOR OP/ED)

## 2024-08-26 PROCEDURE — 2500000004 HC RX 250 GENERAL PHARMACY W/ HCPCS (ALT 636 FOR OP/ED): Performed by: PHYSICIAN ASSISTANT

## 2024-08-26 PROCEDURE — 99233 SBSQ HOSP IP/OBS HIGH 50: CPT | Performed by: FAMILY MEDICINE

## 2024-08-26 PROCEDURE — 82570 ASSAY OF URINE CREATININE: CPT | Performed by: FAMILY MEDICINE

## 2024-08-26 PROCEDURE — 83935 ASSAY OF URINE OSMOLALITY: CPT | Mod: PORLAB | Performed by: FAMILY MEDICINE

## 2024-08-26 PROCEDURE — 36415 COLL VENOUS BLD VENIPUNCTURE: CPT | Performed by: PHYSICIAN ASSISTANT

## 2024-08-26 PROCEDURE — 2500000001 HC RX 250 WO HCPCS SELF ADMINISTERED DRUGS (ALT 637 FOR MEDICARE OP): Performed by: PHYSICIAN ASSISTANT

## 2024-08-26 PROCEDURE — 2020000001 HC ICU ROOM DAILY

## 2024-08-26 PROCEDURE — 99232 SBSQ HOSP IP/OBS MODERATE 35: CPT | Performed by: INTERNAL MEDICINE

## 2024-08-26 PROCEDURE — 2500000002 HC RX 250 W HCPCS SELF ADMINISTERED DRUGS (ALT 637 FOR MEDICARE OP, ALT 636 FOR OP/ED): Performed by: PHYSICIAN ASSISTANT

## 2024-08-26 PROCEDURE — 85027 COMPLETE CBC AUTOMATED: CPT | Performed by: PHYSICIAN ASSISTANT

## 2024-08-26 PROCEDURE — 83735 ASSAY OF MAGNESIUM: CPT | Performed by: PHYSICIAN ASSISTANT

## 2024-08-26 ASSESSMENT — COGNITIVE AND FUNCTIONAL STATUS - GENERAL
DAILY ACTIVITIY SCORE: 24
MOBILITY SCORE: 24
MOBILITY SCORE: 24
DAILY ACTIVITIY SCORE: 24

## 2024-08-26 ASSESSMENT — PAIN - FUNCTIONAL ASSESSMENT: PAIN_FUNCTIONAL_ASSESSMENT: 0-10

## 2024-08-26 ASSESSMENT — PAIN SCALES - GENERAL
PAINLEVEL_OUTOF10: 0 - NO PAIN

## 2024-08-26 NOTE — PROGRESS NOTES
Shani Watson is a 68 y.o. female on day 3 of admission presenting with Ventricular tachycardia (Multi).      Subjective   Shani Watson is a 68 y.o. female with PMHx s/f CAD prior Cabg, recurrent VTACH s/p ablation ICD pacemaker, Degenerative disk disease HTN, depression presenting with light headedness.  Patient has been feeling somewhat fatigued and weak over past few weeks.  This morning patient was lightheaded short of breath contacted EMS was found to be in ventricular tachycardia patient was given dose of IV amiodarone subsequently converted and route.  In the emergency department on presentation her vital signs showed temperature 97.9 heart rate 75 respiratory rate 16 blood pressure 125/83 SpO2 99% on room air.  Chemistry panel showed sodium 133 potassium 4.0 chloride 102 CO2 25 BUN 12 creatinine 1.12 glucose 174 bilirubin 1.8 other liver enzymes within normal limits troponin 11 repeat troponin 13  TSH 2.92. Chest x-ray showing limited inflation pulmonary edema small right pleural effusion. Echo: EF 35-40%, abnormal wall motion, mod MR/TR     8/23/2024: No acute events overnight. Vitals stable, HR 60, /80. K 3.4- replace. Mg 1.76- supplement to keep >2.0     8/24/2024: no acute events. Patient remained amiodarone drip. HR controlled. She had ICD placement. Need EP follow up    8/25/2024: no acute events overnight. Cardiology ordered mexiletine yesterday. Need EP follow up tomorrow. Possible switch to oral amiodarone today.    8/26:                  Today patient seen in the ICU sitting in the bedside chair.  She is awake alert and interactive appropriately.  She does describe having some GI adverse symptoms when she takes mexiletine but the last dose was tolerated as she apparently did not take it on an empty stomach.  Continues paced rhythm.  Likely if remains stable will be suitable for discharge tomorrow.  Otherwise denies interval new symptoms or complaints including chest pain  palpitations pleuritic type pain unusual shortness of breath cough sputum abdominal pain emesis diarrhea fever or chills.       Objective     Last Recorded Vitals  /69   Pulse 64   Temp 36 °C (96.8 °F)   Resp 14   Wt 59.6 kg (131 lb 6.3 oz)   SpO2 99%   Intake/Output last 3 Shifts:  No intake or output data in the 24 hours ending 08/26/24 1705      Admission Weight  Weight: 61.1 kg (134 lb 12.8 oz) (08/22/24 0926)    Daily Weight  08/24/24 : 59.6 kg (131 lb 6.3 oz)    Image Results  Transthoracic Echo (TTE) Complete                [place] [id number]       Phone [phone] Fax [phone]    TRANSTHORACIC ECHOCARDIOGRAM REPORT    Patient Name:      FAYE Ramirez Physician:   [id number] Nico Pickens DO  Study Date:        8/22/2024            Ordering Provider:   [id number] NICO PICKENS  MRN/PID:           [id number]             Fellow:  Accession#:        [id number]         Nurse:  Date of Birth/Age: 1955 / 68      Sonographer:         Yesica Hunter RDCS                     years  Gender:            F                    Additional Staff:  Height:            154.94 cm            Admit Date:          8/22/2024  Weight:            60.78 kg             Admission Status:    Inpatient - Routine  BSA / BMI:         1.59 m2 / 25.32      Department Location: [facility]                     kg/m2  Blood Pressure: 142 /97 mmHg    Study Type:    TRANSTHORACIC ECHO (TTE) COMPLETE  Diagnosis/ICD: Ventricular tachycardia, other-I47.29; Atherosclerosis of                 coronary artery bypass graft(s), unspecified, with other forms of                 angina pectoris-I25.708; Ischemic cardiomyopathy-I25.5  Indication:    V-tach, ischemic cardiomyopathy  CPT Codes:     Echo Complete w Full Doppler-39741; Myocardial Strain                 Imaging-[id number]   Study Detail: The following Echo studies were performed: 2D, M-Mode, Doppler,                color flow and  Strain. Patient has a pacemaker.       PHYSICIAN INTERPRETATION:  Left Ventricle: Left ventricular ejection fraction is moderately decreased, by visual estimate at 35-40%. Wall motion is abnormal. The left ventricular cavity size is normal. The left ventricular septal wall thickness is mildly increased. Left Ventricular Global Longitudinal Strain - 5.7 %. Spectral Doppler shows a pseudonormal pattern of left ventricular diastolic filling.  LV Wall Scoring:  The basal and mid anterior wall, basal and mid anterolateral wall, and basal and  mid inferolateral wall are akinetic. The apical lateral segment and apical  anterior segment are hypokinetic. All remaining scored segments are normal.    Left Atrium: The left atrium is moderately dilated.  Right Ventricle: The right ventricle is mildly enlarged. There is mildly reduced right ventricular systolic function. A device is visualized in the right ventricle.  Right Atrium: The right atrium is mildly dilated. There is a device visualized in the right atrium.  Aortic Valve: The aortic valve is trileaflet. The aortic valve dimensionless index is 0.61. There is trace to mild aortic valve regurgitation. The peak instantaneous gradient of the aortic valve is 8.1 mmHg. The mean gradient of the aortic valve is 5.0 mmHg.  Mitral Valve: The mitral valve is mildly thickened. There is moderate mitral valve regurgitation. The mitral regurgitant orifice area is 23 mm2. The mitral regurgitant volume is 43.11 ml.  Tricuspid Valve: The tricuspid valve is structurally normal. There is moderate tricuspid regurgitation. The Doppler estimated RVSP is moderately elevated at 64.2 mmHg.  Pulmonic Valve: The pulmonic valve is structurally normal. There is mild pulmonic valve regurgitation.  Pericardium: There is no pericardial effusion noted.  Aorta: The aortic root is normal.  Systemic Veins: The inferior vena cava appears to be of normal size. There is IVC inspiratory collapse greater than  50%.       CONCLUSIONS:   1. Multiple segmental abnormalities exist. See findings.   2. Left ventricular ejection fraction is moderately decreased, by visual estimate at 35-40%.   3. Abnormal wall motion.   4. Spectral Doppler shows a pseudonormal pattern of left ventricular diastolic filling.   5. There is mildly reduced right ventricular systolic function.   6. Mildly enlarged right ventricle.   7. The left atrium is moderately dilated.   8. Moderate mitral valve regurgitation.   9. Moderate tricuspid regurgitation.  10. Moderately elevated right ventricular systolic pressure.    QUANTITATIVE DATA SUMMARY:  2D MEASUREMENTS:                            Normal Ranges:  IVSd:          1.20 cm    (0.6-1.1cm)  LVPWd:         1.10 cm    (0.6-1.1cm)  LVIDd:         4.70 cm    (3.9-5.9cm)  LVIDs:         4.00 cm  LV Mass Index: 125.3 g/m2  LV % FS        14.9 %    LA VOLUME:                                Normal Ranges:  LA Vol A4C:        83.7 ml    (22+/-6mL/m2)  LA Vol A2C:        76.4 ml  LA Vol BP:         82.6 ml  LA Vol Index A4C:  52.5ml/m2  LA Vol Index A2C:  48.0 ml/m2  LA Vol Index BP:   51.9 ml/m2  LA Area A4C:       24.1 cm2  LA Area A2C:       23.8 cm2  LA Major Axis A4C: 5.9 cm  LA Major Axis A2C: 6.3 cm  LA Volume Index:   47.9 ml/m2    RA VOLUME BY A/L METHOD:                        Normal Ranges:  RA Area A4C: 21.6 cm2    M-MODE MEASUREMENTS:                   Normal Ranges:  Ao Root: 2.50 cm (2.0-3.7cm)  AoV Exc: 1.40 cm (1.5-2.5cm)  LAs:     3.90 cm (2.7-4.0cm)    AORTA MEASUREMENTS:                       Normal Ranges:  AoV Exc:     1.40 cm (1.5-2.5cm)  Ao Sinus, d: 2.29 cm (2.1-3.5cm)  Ao STJ, d:   2.04 cm (1.7-3.4cm)  Asc Ao, d:   3.00 cm (2.1-3.4cm)    LV SYSTOLIC FUNCTION BY 2D PLANIMETRY (MOD):                                         Normal Ranges:  EF-A4C View:                      40 % (>=55%)  EF-A2C View:                      37 %  EF-Biplane:                       39 %  EF-Visual:                         38 %  LV EF Reported:                   38 %  Global Longitudinal Strain (GLS): 6 %    LV DIASTOLIC FUNCTION:                            Normal Ranges:  MV Peak E:    1.44 m/s    (0.7-1.2 m/s)  MV Peak A:    0.25 m/s    (0.42-0.7 m/s)  E/A Ratio:    5.71        (1.0-2.2)  MV e'         0.054 m/s   (>8.0)  MV lateral e' 0.06 m/s  MV medial e'  0.05 m/s  MV A Dur:     120.00 msec  E/e' Ratio:   26.84       (<8.0)    MITRAL VALVE:                  Normal Ranges:  MV DT: 177 msec (150-240msec)    MITRAL INSUFFICIENCY:                            Normal Ranges:  PISA Radius:  0.7 cm  MR VTI:       191.50 cm  MR Vmax:      526.50 cm/s  MR Alias Emerson: 38.5 cm/s  MR Volume:    43.11 ml  MR Flow Rt:   118.53 ml/s  MR EROA:      23 mm2    AORTIC VALVE:                                    Normal Ranges:  AoV Vmax:                1.42 m/s (<=1.7m/s)  AoV Peak P.1 mmHg (<20mmHg)  AoV Mean P.0 mmHg (1.7-11.5mmHg)  LVOT Max Emerson:            1.01 m/s (<=1.1m/s)  AoV VTI:                 32.70 cm (18-25cm)  LVOT VTI:                20.00 cm  LVOT Diameter:           1.80 cm  (1.8-2.4cm)  AoV Area, VTI:           1.56 cm2 (2.5-5.5cm2)  AoV Area,Vmax:           1.81 cm2 (2.5-4.5cm2)  AoV Dimensionless Index: 0.61       RIGHT VENTRICLE:  RV Basal 4.20 cm  RV Mid   3.10 cm  RV Major 7.0 cm  TAPSE:   16.5 mm  RV s'    0.12 m/s    TRICUSPID VALVE/RVSP:                              Normal Ranges:  Peak TR Velocity: 3.91 m/s  RV Syst Pressure: 64.2 mmHg (< 30mmHg)  IVC Diam:         1.72 cm       [id number] Nico Pickens DO  Electronically signed on 2024 at 3:07:54 PM       Wall Scoring       ** Final **  XR chest 1 view  Narrative: Interpreted By:  Ashley Alexis,   STUDY:  XR CHEST 1 VIEW;  2024 9:48 am      INDICATION:  Signs/Symptoms:SOB.      COMPARISON:  2020      ACCESSION NUMBER(S):  [id number]      ORDERING CLINICIAN:  VINNY PICKETT      FINDINGS:  Multi polar left subclavian  pacer/AICD wires remain in place.  Associated port obscures the lateral left mid chest as previously.  Artifact from additional presumed overlying monitoring/support  devices. Limited pulmonary inflation. Perihilar vascular congestion.  Slight blunting of the right costophrenic angle. Cardiac silhouette  is mildly enlarged status post sternotomy.      Impression: Limited pulmonary inflation. Enlarged cardiac silhouette with  perihilar vascular congestion and probable small right pleural  effusion.      MACRO:  None.      Signed by: Ashley Alexis 8/22/2024 10:15 AM  Dictation workstation:   [id number]      Physical Exam  Constitutional:       General: She is not in acute distress.     Appearance: Normal appearance.   HENT:      Head: Normocephalic.      Mouth/Throat:      Mouth: Mucous membranes are moist.      Pharynx: Oropharynx is clear.   Eyes:      Pupils: Pupils are equal, round, and reactive to light.   Cardiovascular:      Rate and Rhythm: Normal rate and regular rhythm.      Heart sounds: Normal heart sounds. No murmur heard.     No gallop.   Pulmonary:      Effort: Pulmonary effort is normal.      Breath sounds: Normal breath sounds.   Abdominal:      General: Bowel sounds are normal. There is no distension.      Palpations: Abdomen is soft.      Tenderness: There is no abdominal tenderness.   Musculoskeletal:         General: No swelling. Normal range of motion.      Cervical back: Neck supple. No rigidity.   Skin:     General: Skin is warm and dry.   Neurological:      General: No focal deficit present.      Mental Status: She is alert.      Cranial Nerves: No cranial nerve deficit.      Sensory: No sensory deficit.   Psychiatric:         Mood and Affect: Mood normal.         Behavior: Behavior normal.         Relevant Results             Results for orders placed or performed during the hospital encounter of 08/22/24 (from the past 96 hour(s))   CBC   Result Value Ref Range    WBC 7.8 4.4 - 11.3  x10*3/uL    nRBC 0.0 0.0 - 0.0 /100 WBCs    RBC 4.23 4.00 - 5.20 x10*6/uL    Hemoglobin 12.2 12.0 - 16.0 g/dL    Hematocrit 36.5 36.0 - 46.0 %    MCV 86 80 - 100 fL    MCH 28.8 26.0 - 34.0 pg    MCHC 33.4 32.0 - 36.0 g/dL    RDW 13.7 11.5 - 14.5 %    Platelets 210 150 - 450 x10*3/uL   Basic metabolic panel   Result Value Ref Range    Glucose 90 74 - 99 mg/dL    Sodium 137 136 - 145 mmol/L    Potassium 3.4 (L) 3.5 - 5.3 mmol/L    Chloride 105 98 - 107 mmol/L    Bicarbonate 26 21 - 32 mmol/L    Anion Gap 9 (L) 10 - 20 mmol/L    Urea Nitrogen 19 6 - 23 mg/dL    Creatinine 1.17 (H) 0.50 - 1.05 mg/dL    eGFR 51 (L) >60 mL/min/1.73m*2    Calcium 8.3 (L) 8.6 - 10.3 mg/dL   Magnesium   Result Value Ref Range    Magnesium 1.76 1.60 - 2.40 mg/dL   CBC   Result Value Ref Range    WBC 8.3 4.4 - 11.3 x10*3/uL    nRBC 0.0 0.0 - 0.0 /100 WBCs    RBC 4.79 4.00 - 5.20 x10*6/uL    Hemoglobin 13.7 12.0 - 16.0 g/dL    Hematocrit 40.9 36.0 - 46.0 %    MCV 85 80 - 100 fL    MCH 28.6 26.0 - 34.0 pg    MCHC 33.5 32.0 - 36.0 g/dL    RDW 13.7 11.5 - 14.5 %    Platelets 222 150 - 450 x10*3/uL   Basic Metabolic Panel   Result Value Ref Range    Glucose 106 (H) 74 - 99 mg/dL    Sodium 132 (L) 136 - 145 mmol/L    Potassium 4.5 3.5 - 5.3 mmol/L    Chloride 95 (L) 98 - 107 mmol/L    Bicarbonate 30 21 - 32 mmol/L    Anion Gap 12 10 - 20 mmol/L    Urea Nitrogen 21 6 - 23 mg/dL    Creatinine 1.50 (H) 0.50 - 1.05 mg/dL    eGFR 38 (L) >60 mL/min/1.73m*2    Calcium 9.6 8.6 - 10.3 mg/dL   Magnesium   Result Value Ref Range    Magnesium 2.37 1.60 - 2.40 mg/dL   POCT GLUCOSE   Result Value Ref Range    POCT Glucose 104 (H) 74 - 99 mg/dL   Basic metabolic panel   Result Value Ref Range    Glucose 119 (H) 74 - 99 mg/dL    Sodium 129 (L) 136 - 145 mmol/L    Potassium 4.3 3.5 - 5.3 mmol/L    Chloride 95 (L) 98 - 107 mmol/L    Bicarbonate 25 21 - 32 mmol/L    Anion Gap 13 10 - 20 mmol/L    Urea Nitrogen 20 6 - 23 mg/dL    Creatinine 1.33 (H) 0.50 - 1.05 mg/dL     eGFR 43 (L) >60 mL/min/1.73m*2    Calcium 9.4 8.6 - 10.3 mg/dL   CBC   Result Value Ref Range    WBC 9.4 4.4 - 11.3 x10*3/uL    nRBC 0.0 0.0 - 0.0 /100 WBCs    RBC 4.61 4.00 - 5.20 x10*6/uL    Hemoglobin 13.3 12.0 - 16.0 g/dL    Hematocrit 38.4 36.0 - 46.0 %    MCV 83 80 - 100 fL    MCH 28.9 26.0 - 34.0 pg    MCHC 34.6 32.0 - 36.0 g/dL    RDW 13.5 11.5 - 14.5 %    Platelets 248 150 - 450 x10*3/uL   Basic Metabolic Panel   Result Value Ref Range    Glucose 121 (H) 74 - 99 mg/dL    Sodium 127 (L) 136 - 145 mmol/L    Potassium 4.4 3.5 - 5.3 mmol/L    Chloride 90 (L) 98 - 107 mmol/L    Bicarbonate 29 21 - 32 mmol/L    Anion Gap 12 10 - 20 mmol/L    Urea Nitrogen 20 6 - 23 mg/dL    Creatinine 1.42 (H) 0.50 - 1.05 mg/dL    eGFR 40 (L) >60 mL/min/1.73m*2    Calcium 9.5 8.6 - 10.3 mg/dL   Magnesium   Result Value Ref Range    Magnesium 2.20 1.60 - 2.40 mg/dL   CBC   Result Value Ref Range    WBC 7.5 4.4 - 11.3 x10*3/uL    nRBC 0.0 0.0 - 0.0 /100 WBCs    RBC 4.72 4.00 - 5.20 x10*6/uL    Hemoglobin 13.7 12.0 - 16.0 g/dL    Hematocrit 39.3 36.0 - 46.0 %    MCV 83 80 - 100 fL    MCH 29.0 26.0 - 34.0 pg    MCHC 34.9 32.0 - 36.0 g/dL    RDW 13.2 11.5 - 14.5 %    Platelets 204 150 - 450 x10*3/uL   Basic Metabolic Panel   Result Value Ref Range    Glucose 100 (H) 74 - 99 mg/dL    Sodium 125 (L) 136 - 145 mmol/L    Potassium 3.6 3.5 - 5.3 mmol/L    Chloride 89 (L) 98 - 107 mmol/L    Bicarbonate 27 21 - 32 mmol/L    Anion Gap 13 10 - 20 mmol/L    Urea Nitrogen 18 6 - 23 mg/dL    Creatinine 1.43 (H) 0.50 - 1.05 mg/dL    eGFR 40 (L) >60 mL/min/1.73m*2    Calcium 9.2 8.6 - 10.3 mg/dL   Magnesium   Result Value Ref Range    Magnesium 2.04 1.60 - 2.40 mg/dL     Scheduled medications  amiodarone, 400 mg, oral, BID   Followed by  [START ON 9/7/2024] amiodarone, 200 mg, oral, Daily  carvedilol, 12.5 mg, oral, BID  empagliflozin, 10 mg, oral, Daily  enoxaparin, 40 mg, subcutaneous, q24h  ezetimibe, 10 mg, oral, Daily  furosemide, 20 mg,  oral, BID  lisinopril, 2.5 mg, oral, Daily  magnesium oxide, 400 mg, oral, Daily  mexiletine, 150 mg, oral, q8h ANGELIKA  perflutren lipid microspheres, 0.5-10 mL of dilution, intravenous, Once in imaging  perflutren protein A microsphere, 0.5 mL, intravenous, Once in imaging  polyethylene glycol, 17 g, oral, Daily  sertraline, 150 mg, oral, Daily  spironolactone, 12.5 mg, oral, Daily  sulfur hexafluoride microsphr, 2 mL, intravenous, Once in imaging      Continuous medications       PRN medications  PRN medications: acetaminophen **OR** acetaminophen **OR** acetaminophen, calcium carbonate, gabapentin, ondansetron ODT **OR** ondansetron    Assessment/Plan   This patient currently has cardiac telemetry ordered; if you would like to modify or discontinue the telemetry order, click here to go to the orders activity to modify/discontinue the order.              Assessment & Plan  Ventricular tachycardia (Multi)    CAD in native artery    Cardiac resynchronization therapy defibrillator (CRT-D) in place    Chronic systolic heart failure (Multi)    Blood glucose abnormal      1. Ventricular tachycardia (Multi)      Add mexiletine yesterday, continue Coreg and amiodarone drip, monitor electrolytes. may switch to oral amiodarone. need EP follow up.  8/26: Continues on amiodarone and mexiletine.  No further V. tach noted.  Having some difficulty tolerating mexiletine.      2. 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 S/P CABG x 1      risk modification      4. Ischemic cardiomyopathy  Transthoracic Echo (TTE) Complete    Transthoracic Echo (TTE) Complete      5. Chronic systolic heart failure (Multi)      compensated, continue lasix  8/26: Cardiology feels CHF compensated.      6. Cardiac resynchronization therapy defibrillator (CRT-D) in place        7. Benign  essential hypertension      lisinopril and Coreg      8. V-tach (Multi)  Cardiac Device Check - Inpatient    Cardiac Device Check - Inpatient     9.  Hyponatremia     8/26: Continues moderate hyponatremia.  Uncertain etiology.  Review of EMR shows going back to 2020 she has had this intermittently.  Continue to monitor.                   Ernesto Beasley MD

## 2024-08-26 NOTE — PROGRESS NOTES
Progress note    8/26:  Patient reports no allergy to milk - removed from Allergy list.  Pt with a milk intolerance, and avoids increased amount of dairy.      Will notify dietary,  and follow per goals of care.

## 2024-08-26 NOTE — PROGRESS NOTES
Completed rounds with CATHERINE Pond 24 hours per Cardiology input and plan. Patient's discharge plan remains home when medically ready, no needs. TCC to continue to follow.

## 2024-08-26 NOTE — CARE PLAN
Problem: Pain - Adult  Goal: Verbalizes/displays adequate comfort level or baseline comfort level  Outcome: Progressing     Problem: Safety - Adult  Goal: Free from fall injury  Outcome: Progressing     Problem: Discharge Planning  Goal: Discharge to home or other facility with appropriate resources  Outcome: Progressing     Problem: Chronic Conditions and Co-morbidities  Goal: Patient's chronic conditions and co-morbidity symptoms are monitored and maintained or improved  Outcome: Progressing     Problem: Heart Failure  Goal: Report improvement of dyspnea/breathlessness this shift  Outcome: Progressing     Problem: Arrythmia/Dysrhythmia  Goal: Serial ECG will return to baseline  Outcome: Progressing

## 2024-08-26 NOTE — PROGRESS NOTES
Subjective Data:  Denies any new complains today. Feels well.     Overnight Events:    No arrhythmia noted overnight.  Device interrogation was reviewed.  VT was not captured on her device interrogation.  VT detection zone was turned off on the device and this was slow VT so we could not capture this on device.  Nevertheless no further arrhythmias noted on telemetry monitor.  Case discussed with EP team.  8/26/2024 : No more NSVT. C/O nausea     Objective Data:  Last Recorded Vitals:  Vitals:    08/26/24 0300 08/26/24 0400 08/26/24 0500 08/26/24 0600   BP: 98/66 105/68 106/67 104/74   Pulse: 60 60 60 60   Resp: 15 (!) 27 16 12   Temp: 37.1 °C (98.8 °F)      TempSrc: Temporal      SpO2: 97% 90% 94% 97%   Weight:       Height:           Last Labs:  CBC - 8/26/2024:  5:50 AM  7.5 13.7 204    39.3      CMP - 8/26/2024:  5:50 AM  9.2 6.2 25 --- 1.8   _ 4.2 14 92      PTT - No results in last year.  _   _ _     TROPHS   Date/Time Value Ref Range Status   08/22/2024 10:50 AM 13 0 - 13 ng/L Final   08/22/2024 09:35 AM 11 0 - 13 ng/L Final     BNP   Date/Time Value Ref Range Status   08/22/2024 09:35  0 - 99 pg/mL Final     HGBA1C   Date/Time Value Ref Range Status   08/22/2024 09:35 AM 5.7 see below % Final   10/26/2018 08:58 AM 5.5 % Final     Comment:          Diagnosis of Diabetes-Adults   Non-Diabetic: < or = 5.6%   Increased risk for developing diabetes: 5.7-6.4%   Diagnostic of diabetes: > or = 6.5%  .       Monitoring of Diabetes                Age (y)     Therapeutic Goal (%)   Adults:          >18           <7.0   Pediatrics:    13-18           <7.5                   7-12           <8.0                   0- 6            7.5-8.5   American Diabetes Association. Diabetes Care 33(S1), Jan 2010.       LDLCALC   Date/Time Value Ref Range Status   02/05/2024 09:59 AM 81 <=99 mg/dL Final     Comment:                                 Near   Borderline      AGE      Desirable  Optimal    High     High     Very High      0-19 Y     0 - 109     ---    110-129   >/= 130     ----    20-24 Y     0 - 119     ---    120-159   >/= 160     ----      >24 Y     0 -  99   100-129  130-159   160-189     >/=190       VLDL   Date/Time Value Ref Range Status   02/05/2024 09:59 AM 43 0 - 40 mg/dL Final   11/10/2022 07:28 AM 15 0 - 40 mg/dL Final   02/15/2022 03:45 PM 22 0 - 40 mg/dL Final   08/13/2021 07:11 AM 23 0 - 40 mg/dL Final      Last I/O:  I/O last 3 completed shifts:  In: 839 (14.1 mL/kg) [P.O.:480; I.V.:359 (6 mL/kg)]  Out: - (0 mL/kg)   Weight: 59.6 kg     Past Cardiology Tests (Last 3 Years):  EKG:  ECG 12 lead (Clinic Performed) 07/22/2024      ECG 12 Lead 06/25/2024      Electrocardiogram - Day of Discharge 06/14/2024      Electrocardiogram - Post Ablation 06/14/2024      ECG 12 lead (Clinic Performed) 03/25/2024    Echo:  Transthoracic Echo (TTE) Complete 08/22/2024      Transthoracic echo (TTE) complete 01/03/2024    Ejection Fractions:  EF   Date/Time Value Ref Range Status   08/22/2024 02:29 PM 38 %    01/03/2024 01:33 PM 45       Cath:  Cardiac Catheterization Procedure 04/11/2024    Stress Test:  Nuclear Stress Test 03/21/2022    Cardiac Imaging:  No results found for this or any previous visit from the past 1095 days.      Inpatient Medications:  Scheduled medications   Medication Dose Route Frequency    amiodarone  400 mg oral BID    Followed by    [START ON 9/7/2024] amiodarone  200 mg oral Daily    carvedilol  12.5 mg oral BID    empagliflozin  10 mg oral Daily    enoxaparin  40 mg subcutaneous q24h    ezetimibe  10 mg oral Daily    furosemide  20 mg oral BID    lisinopril  2.5 mg oral Daily    magnesium oxide  400 mg oral Daily    mexiletine  150 mg oral q8h ANGELIKA    perflutren lipid microspheres  0.5-10 mL of dilution intravenous Once in imaging    perflutren protein A microsphere  0.5 mL intravenous Once in imaging    polyethylene glycol  17 g oral Daily    sertraline  150 mg oral Daily    spironolactone  12.5 mg oral Daily     sulfur hexafluoride microsphr  2 mL intravenous Once in imaging     PRN medications   Medication    acetaminophen    Or    acetaminophen    Or    acetaminophen    calcium carbonate    gabapentin    ondansetron ODT    Or    ondansetron     Continuous Medications   Medication Dose Last Rate       Physical Exam:  Constitutional: Cooperative, in no acute distress, alert, appears stated age.  Skin: Skin color, texture, turgor normal. No rashes or lesions.  Head: Normocephalic. No masses, lesions, tenderness or abnormalities  Eyes: Extraocular movements are grossly intact.  Mouth and throat: Mucous membranes moist  Neck: Neck supple, no carotid bruits, no JVD  Respiratory: CTA bilateral   Chest wall: Sternal scar, ICD, normal excursion with respiration  Cardiovascular: Regular rhythm with + murmur 2/6 holosystolic at apex  Gastrointestinal: Abdomen soft, nontender. Bowel sounds normal.  Musculoskeletal: Strength equal in upper extremities  Extremities: No pitting edema, no cyanosis   Neurologic: Sensation grossly intact, alert and oriented ×3           Assessment/Plan   1.  VT  Patient had an episode of VT noted by EMS for which she received IV amiodarone and magnesium.  The patient has a history of ventricular tachycardia and is underwent 2 prior ablations VT ablation in 2019 and again in June 2024.  Patient also has a BiV ICD in place and is chronically on amiodarone therapy.  Would continue home beta-blocker and uptitrate dose of Coreg to 12.5 mg twice daily  Patient had another episode of VT overnight and was transferred to ICU.  Patient was switched to amiodarone IV.  Will continue IV amiodarone at this time.  .  08/24  No in-house EP available.  Case was discussed with patient's EP provider.  We will also start on mexiletine 150 mg 3 times daily.  No urgent indication for transfer for ablation as inpatient given asymptomatic patient.  I will also request device interrogation.    08/25/2024- No further episode of  VT on monitor. Patient on mexiletine as per EP recs. Will switch to oral amiodarone. Device interrogation was reviewed.  VT was not captured on her device interrogation.  VT detection zone was turned off on the device and this was slow VT so we could not capture this on device. No arrhythmia in Vfibb zone were noted. Discussed with EP.   8- : No further VT - Continue amio and Mexilitine     2.  HFrEF  Patient presenting with increased shortness of breath found to have elevated BNP of 876 as well as chest x-ray showing perihilar vascular congestion and small pleural effusion.  Patient has been started on IV furosemide for diuresis.  Would continue home dose of spironolactone.  Will add SGLT2 inhibitor.  Continue beta-blocker therapy.  Monitor urine output, renal function, and serum electrolytes while here.  Echocardiogram done in January 2024 showed mildly reduced left ventricular systolic function with an ejection fraction of 45%, grade 3 diastolic dysfunction, low normal right ventricular systolic function, moderate mitral valve regurgitation, and mild to moderate tricuspid valve regurgitation.  8/24/2024 : increase coreg dose  08/25/2024- Will switch to oral lasix. Continue other medical therapy, appears compensated one exam.   8/26/2024 : Compensated        3.  CAD  The patient has a history of coronary artery disease with prior bypass done in October 2018.  Cardiac catheterization done in April 2024 showed moderate nonobstructive CAD with a patent LIMA to LAD.  This appears stable she denies any chest discomfort.  Troponin was 11-13.  Continue with medical therapy.     4.  Mitral regurgitation  Echocardiogram done in January 2024 showed mildly reduced left ventricular systolic function with an ejection fraction of 45%, grade 3 diastolic dysfunction, low normal right ventricular systolic function, moderate mitral valve regurgitation, and mild to moderate tricuspid valve regurgitation.      5.   Hypertension  Blood pressure appears well-controlled.  Would continue home antihypertensive medical therapy.  Hold BP meds for SBP<90 or DBP <60     6.  Dyslipidemia  Patient is on Zetia as an outpatient.    Code Status:  Full Code    I spent 31 minutes in the professional and overall care of this patient.        Liban Campa MD

## 2024-08-27 ENCOUNTER — PHARMACY VISIT (OUTPATIENT)
Dept: PHARMACY | Facility: CLINIC | Age: 69
End: 2024-08-27
Payer: MEDICARE

## 2024-08-27 VITALS
TEMPERATURE: 97.9 F | WEIGHT: 131.39 LBS | SYSTOLIC BLOOD PRESSURE: 96 MMHG | HEIGHT: 61 IN | DIASTOLIC BLOOD PRESSURE: 55 MMHG | HEART RATE: 60 BPM | BODY MASS INDEX: 24.81 KG/M2 | RESPIRATION RATE: 9 BRPM | OXYGEN SATURATION: 97 %

## 2024-08-27 PROBLEM — I47.20 VENTRICULAR TACHYCARDIA (MULTI): Status: RESOLVED | Noted: 2024-06-14 | Resolved: 2024-08-27

## 2024-08-27 PROBLEM — R73.09 BLOOD GLUCOSE ABNORMAL: Status: RESOLVED | Noted: 2024-08-22 | Resolved: 2024-08-27

## 2024-08-27 LAB
ANION GAP SERPL CALC-SCNC: 11 MMOL/L (ref 10–20)
BUN SERPL-MCNC: 19 MG/DL (ref 6–23)
CALCIUM SERPL-MCNC: 9.1 MG/DL (ref 8.6–10.3)
CHLORIDE SERPL-SCNC: 93 MMOL/L (ref 98–107)
CO2 SERPL-SCNC: 27 MMOL/L (ref 21–32)
CREAT SERPL-MCNC: 1.36 MG/DL (ref 0.5–1.05)
EGFRCR SERPLBLD CKD-EPI 2021: 42 ML/MIN/1.73M*2
ERYTHROCYTE [DISTWIDTH] IN BLOOD BY AUTOMATED COUNT: 13.2 % (ref 11.5–14.5)
GLUCOSE SERPL-MCNC: 94 MG/DL (ref 74–99)
HCT VFR BLD AUTO: 39.4 % (ref 36–46)
HGB BLD-MCNC: 13.8 G/DL (ref 12–16)
MAGNESIUM SERPL-MCNC: 2.19 MG/DL (ref 1.6–2.4)
MCH RBC QN AUTO: 29 PG (ref 26–34)
MCHC RBC AUTO-ENTMCNC: 35 G/DL (ref 32–36)
MCV RBC AUTO: 83 FL (ref 80–100)
NRBC BLD-RTO: 0 /100 WBCS (ref 0–0)
OSMOLALITY SERPL: 275 MOSM/KG (ref 280–300)
OSMOLALITY UR: 143 MOSM/KG (ref 200–1200)
PLATELET # BLD AUTO: 206 X10*3/UL (ref 150–450)
POTASSIUM SERPL-SCNC: 3.7 MMOL/L (ref 3.5–5.3)
RBC # BLD AUTO: 4.76 X10*6/UL (ref 4–5.2)
SODIUM SERPL-SCNC: 127 MMOL/L (ref 136–145)
URATE SERPL-MCNC: 3.6 MG/DL (ref 2.3–6.7)
WBC # BLD AUTO: 6.2 X10*3/UL (ref 4.4–11.3)

## 2024-08-27 PROCEDURE — 85027 COMPLETE CBC AUTOMATED: CPT | Performed by: PHYSICIAN ASSISTANT

## 2024-08-27 PROCEDURE — 36415 COLL VENOUS BLD VENIPUNCTURE: CPT | Performed by: PHYSICIAN ASSISTANT

## 2024-08-27 PROCEDURE — 83735 ASSAY OF MAGNESIUM: CPT | Performed by: PHYSICIAN ASSISTANT

## 2024-08-27 PROCEDURE — 99232 SBSQ HOSP IP/OBS MODERATE 35: CPT | Performed by: NURSE PRACTITIONER

## 2024-08-27 PROCEDURE — 99239 HOSP IP/OBS DSCHRG MGMT >30: CPT | Performed by: FAMILY MEDICINE

## 2024-08-27 PROCEDURE — 84550 ASSAY OF BLOOD/URIC ACID: CPT | Performed by: FAMILY MEDICINE

## 2024-08-27 PROCEDURE — 80048 BASIC METABOLIC PNL TOTAL CA: CPT | Performed by: PHYSICIAN ASSISTANT

## 2024-08-27 PROCEDURE — 2500000001 HC RX 250 WO HCPCS SELF ADMINISTERED DRUGS (ALT 637 FOR MEDICARE OP): Performed by: STUDENT IN AN ORGANIZED HEALTH CARE EDUCATION/TRAINING PROGRAM

## 2024-08-27 PROCEDURE — 2500000001 HC RX 250 WO HCPCS SELF ADMINISTERED DRUGS (ALT 637 FOR MEDICARE OP): Performed by: PHYSICIAN ASSISTANT

## 2024-08-27 PROCEDURE — RXMED WILLOW AMBULATORY MEDICATION CHARGE

## 2024-08-27 PROCEDURE — 83930 ASSAY OF BLOOD OSMOLALITY: CPT | Mod: PORLAB | Performed by: FAMILY MEDICINE

## 2024-08-27 PROCEDURE — 2500000004 HC RX 250 GENERAL PHARMACY W/ HCPCS (ALT 636 FOR OP/ED): Performed by: PHYSICIAN ASSISTANT

## 2024-08-27 RX ORDER — AMIODARONE HYDROCHLORIDE 200 MG/1
TABLET ORAL
Qty: 70 TABLET | Refills: 0 | Status: SHIPPED | OUTPATIENT
Start: 2024-08-27 | End: 2024-09-04 | Stop reason: ALTCHOICE

## 2024-08-27 RX ORDER — LISINOPRIL 2.5 MG/1
2.5 TABLET ORAL DAILY
Qty: 30 TABLET | Refills: 0 | Status: SHIPPED | OUTPATIENT
Start: 2024-08-28 | End: 2024-09-27

## 2024-08-27 RX ORDER — LANOLIN ALCOHOL/MO/W.PET/CERES
400 CREAM (GRAM) TOPICAL DAILY
Qty: 30 TABLET | Refills: 0 | Status: SHIPPED | OUTPATIENT
Start: 2024-08-28 | End: 2024-09-27

## 2024-08-27 RX ORDER — MEXILETINE HYDROCHLORIDE 150 MG/1
150 CAPSULE ORAL EVERY 8 HOURS SCHEDULED
Qty: 100 CAPSULE | Refills: 0 | Status: SHIPPED | OUTPATIENT
Start: 2024-08-27 | End: 2024-09-26

## 2024-08-27 RX ORDER — CARVEDILOL 12.5 MG/1
12.5 TABLET ORAL 2 TIMES DAILY
Qty: 60 TABLET | Refills: 0 | Status: SHIPPED | OUTPATIENT
Start: 2024-08-27 | End: 2024-09-26

## 2024-08-27 RX ORDER — FUROSEMIDE 20 MG/1
20 TABLET ORAL
Qty: 60 TABLET | Refills: 0 | Status: SHIPPED | OUTPATIENT
Start: 2024-08-27 | End: 2024-09-26

## 2024-08-27 ASSESSMENT — COGNITIVE AND FUNCTIONAL STATUS - GENERAL
MOBILITY SCORE: 24
DAILY ACTIVITIY SCORE: 24

## 2024-08-27 ASSESSMENT — PAIN - FUNCTIONAL ASSESSMENT
PAIN_FUNCTIONAL_ASSESSMENT: 0-10

## 2024-08-27 ASSESSMENT — PAIN SCALES - GENERAL
PAINLEVEL_OUTOF10: 0 - NO PAIN
PAINLEVEL_OUTOF10: 0 - NO PAIN
PAINLEVEL_OUTOF10: 6
PAINLEVEL_OUTOF10: 0 - NO PAIN

## 2024-08-27 NOTE — PROGRESS NOTES
Subjective Data:  Denies any new complains today. Feels well.     Overnight Events:    No arrhythmia noted overnight.  Device interrogation was reviewed.  VT was not captured on her device interrogation.  VT detection zone was turned off on the device and this was slow VT so we could not capture this on device.  Nevertheless no further arrhythmias noted on telemetry monitor.  Case discussed with EP team.  8/26/2024 : No more NSVT. C/O nausea  8/27/24:  Reports issues with back pain overnight that improved after taking gabapentin.  No further VT/NSVT over the past 24 hours upon review of telemetry.  RN to walk patient with portable monitor to monitor rhythm as patient reports she was sweeping a rug off when VT occurred prior to admission. Hospitalist is following hyponatremia.  AV paced on telemetry.     Objective Data:  Last Recorded Vitals:  Vitals:    08/27/24 0600 08/27/24 0700 08/27/24 0705 08/27/24 0800   BP: 107/74 104/66  99/67   BP Location:       Patient Position:       Pulse: 60 60 60 60   Resp: 12 14 15 13   Temp:   36.6 °C (97.9 °F)    TempSrc:   Temporal    SpO2: 99% 92% 90% 94%   Weight:       Height:           Last Labs:  Results from last 7 days   Lab Units 08/27/24  0624 08/26/24  0550 08/25/24  0403   WBC AUTO x10*3/uL 6.2 7.5 9.4   HEMOGLOBIN g/dL 13.8 13.7 13.3   HEMATOCRIT % 39.4 39.3 38.4   PLATELETS AUTO x10*3/uL 206 204 248      Results from last 7 days   Lab Units 08/27/24  0624 08/26/24  0550 08/25/24  0403 08/23/24  0614 08/22/24  0935   SODIUM mmol/L 127* 125* 127*   < > 133*   POTASSIUM mmol/L 3.7 3.6 4.4   < > 4.0   CHLORIDE mmol/L 93* 89* 90*   < > 102   CO2 mmol/L 27 27 29   < > 25   BUN mg/dL 19 18 20   < > 12   CREATININE mg/dL 1.36* 1.43* 1.42*   < > 1.12*   CALCIUM mg/dL 9.1 9.2 9.5   < > 8.6   PROTEIN TOTAL g/dL  --   --   --   --  6.2*   BILIRUBIN TOTAL mg/dL  --   --   --   --  1.8*   ALK PHOS U/L  --   --   --   --  92   ALT U/L  --   --   --   --  14   AST U/L  --   --   --    --  25   GLUCOSE mg/dL 94 100* 121*   < > 174*    < > = values in this interval not displayed.      Lab Results   Component Value Date    TSH 2.92 08/06/2024      PTT - No results in last year.  _   _ _     TROPHS   Date/Time Value Ref Range Status   08/22/2024 10:50 AM 13 0 - 13 ng/L Final   08/22/2024 09:35 AM 11 0 - 13 ng/L Final     BNP   Date/Time Value Ref Range Status   08/22/2024 09:35  0 - 99 pg/mL Final     HGBA1C   Date/Time Value Ref Range Status   08/22/2024 09:35 AM 5.7 see below % Final   10/26/2018 08:58 AM 5.5 % Final     Comment:          Diagnosis of Diabetes-Adults   Non-Diabetic: < or = 5.6%   Increased risk for developing diabetes: 5.7-6.4%   Diagnostic of diabetes: > or = 6.5%  .       Monitoring of Diabetes                Age (y)     Therapeutic Goal (%)   Adults:          >18           <7.0   Pediatrics:    13-18           <7.5                   7-12           <8.0                   0- 6            7.5-8.5   American Diabetes Association. Diabetes Care 33(S1), Jan 2010.       LDLCALC   Date/Time Value Ref Range Status   02/05/2024 09:59 AM 81 <=99 mg/dL Final     Comment:                                 Near   Borderline      AGE      Desirable  Optimal    High     High     Very High     0-19 Y     0 - 109     ---    110-129   >/= 130     ----    20-24 Y     0 - 119     ---    120-159   >/= 160     ----      >24 Y     0 -  99   100-129  130-159   160-189     >/=190       VLDL   Date/Time Value Ref Range Status   02/05/2024 09:59 AM 43 0 - 40 mg/dL Final   11/10/2022 07:28 AM 15 0 - 40 mg/dL Final   02/15/2022 03:45 PM 22 0 - 40 mg/dL Final   08/13/2021 07:11 AM 23 0 - 40 mg/dL Final      Last I/O:  I/O last 3 completed shifts:  In: 960 (16.1 mL/kg) [P.O.:960]  Out: 200 (3.4 mL/kg) [Urine:200 (0.1 mL/kg/hr)]  Weight: 59.6 kg     Echo:  Transthoracic Echo (TTE) Complete 08/22/2024  1. Multiple segmental abnormalities exist. See findings.   2. Left ventricular ejection fraction is  moderately decreased, by visual estimate at 35-40%.   3. Abnormal wall motion.   4. Spectral Doppler shows a pseudonormal pattern of left ventricular diastolic filling.   5. There is mildly reduced right ventricular systolic function.   6. Mildly enlarged right ventricle.   7. The left atrium is moderately dilated.   8. Moderate mitral valve regurgitation.   9. Moderate tricuspid regurgitation.  10. Moderately elevated right ventricular systolic pressure.    Transthoracic echo (TTE) complete 01/03/2024    Ejection Fractions:  EF   Date/Time Value Ref Range Status   08/22/2024 02:29 PM 38 %    01/03/2024 01:33 PM 45       Cath:  Cardiac Catheterization Procedure 04/11/2024   1. Moderate non-obstructive coronary artery disease.   2. Patent LIMA to LAD.   3. Left Ventricular end-diastolic pressure = 12.   4. Normal LV filling pressures.    Inpatient Medications:  Scheduled medications   Medication Dose Route Frequency    amiodarone  400 mg oral BID    Followed by    [START ON 9/7/2024] amiodarone  200 mg oral Daily    carvedilol  12.5 mg oral BID    empagliflozin  10 mg oral Daily    enoxaparin  40 mg subcutaneous q24h    ezetimibe  10 mg oral Daily    furosemide  20 mg oral BID    lisinopril  2.5 mg oral Daily    magnesium oxide  400 mg oral Daily    mexiletine  150 mg oral q8h ANGELIKA    perflutren lipid microspheres  0.5-10 mL of dilution intravenous Once in imaging    perflutren protein A microsphere  0.5 mL intravenous Once in imaging    polyethylene glycol  17 g oral Daily    sertraline  150 mg oral Daily    spironolactone  12.5 mg oral Daily    sulfur hexafluoride microsphr  2 mL intravenous Once in imaging     PRN medications   Medication    acetaminophen    Or    acetaminophen    Or    acetaminophen    calcium carbonate    gabapentin    menthol    ondansetron ODT    Or    ondansetron     Continuous Medications   Medication Dose Last Rate       Physical Exam:  Constitutional: Cooperative, in no acute distress,  alert, appears stated age.  Skin: Skin color, texture, turgor normal. No rashes or lesions.  Head: Normocephalic. No masses, lesions, tenderness or abnormalities  Eyes: Extraocular movements are grossly intact.  Mouth and throat: Mucous membranes moist  Neck: Neck supple, no carotid bruits, no JVD  Respiratory: CTA bilateral   Chest wall: Sternal scar, ICD, normal excursion with respiration  Cardiovascular: Regular rhythm with + murmur 2/6 holosystolic at apex  Gastrointestinal: Abdomen soft, nontender. Bowel sounds normal.  Musculoskeletal: Strength equal in upper extremities  Extremities: No pitting edema, no cyanosis   Neurologic: Sensation grossly intact, alert and oriented ×3           Assessment/Plan   1.  VT  Patient had an episode of VT noted by EMS for which she received IV amiodarone and magnesium.  The patient has a history of ventricular tachycardia and is underwent 2 prior ablations VT ablation in 2019 and again in June 2024.  Patient also has a BiV ICD in place and is chronically on amiodarone therapy.  Would continue home beta-blocker and uptitrate dose of Coreg to 12.5 mg twice daily  Patient had another episode of VT overnight and was transferred to ICU.  Patient was switched to amiodarone IV.  Will continue IV amiodarone at this time.  .  08/24  No in-house EP available.  Case was discussed with patient's EP provider.  We will also start on mexiletine 150 mg 3 times daily.  No urgent indication for transfer for ablation as inpatient given asymptomatic patient.  I will also request device interrogation.    08/25/2024- No further episode of VT on monitor. Patient on mexiletine as per EP recs. Will switch to oral amiodarone. Device interrogation was reviewed.  VT was not captured on her device interrogation.  VT detection zone was turned off on the device and this was slow VT so we could not capture this on device. No arrhythmia in Vfibb zone were noted. Discussed with EP.   8- : No further VT  - Continue amio and Mexilitine  8/27/24:  No further VT over the past 24 hours upon review of telemetry.  Continue Amiodarone 400 mg BID x25 doses total and then 200 mg daily.  Continue carvedilol 12.5 mg BID and mexiletine 150 mg every 8 hours.  She will need to arrange f/u with Dr. Macias, her usual EP in 1-2 weeks - she verbalized understanding.  Discussed with Dr. Liban Campa and since she has not had any further VT over the past 24 hours, okay to from cardiac standpoint to d/c home.  Message sent to arrange f/u with Dr. Liban Campa.     2.  HFrEF  Patient presenting with increased shortness of breath found to have elevated BNP of 876 as well as chest x-ray showing perihilar vascular congestion and small pleural effusion.  Patient has been started on IV furosemide for diuresis.  Would continue home dose of spironolactone.  Will add SGLT2 inhibitor.  Continue beta-blocker therapy.  Monitor urine output, renal function, and serum electrolytes while here.  Echocardiogram done in January 2024 showed mildly reduced left ventricular systolic function with an ejection fraction of 45%, grade 3 diastolic dysfunction, low normal right ventricular systolic function, moderate mitral valve regurgitation, and mild to moderate tricuspid valve regurgitation.  8/24/2024 : increase coreg dose  08/25/2024- Will switch to oral lasix. Continue other medical therapy, appears compensated one exam.   8/26/2024 : Compensated  8/27/24: HFrEF  Not volume overloaded on exam  Continue current medications which include:  Beta blocker: Carvedilol 12.5 mg BID  ACE inhibitor/ARB/ARNI: Lisinopril 2.5 mg daily  MRA: spironolactone 12.5 mg daily  SGLT2i:  empagliflozin 10 mg daily  Diuretic: Furosemide 20 mg BID  Device: Medtronic CRT-D  TTE Aug 2024 with LVEF 35-40%       3.  CAD  The patient has a history of coronary artery disease with prior bypass done in October 2018.  Cardiac catheterization done in April 2024 showed moderate nonobstructive  CAD with a patent LIMA to LAD.  This appears stable she denies any chest discomfort.  Troponin was 11-13.  Continue with medical therapy.     4.  Mitral regurgitation  Echocardiogram done in January 2024 showed mildly reduced left ventricular systolic function with an ejection fraction of 45%, grade 3 diastolic dysfunction, low normal right ventricular systolic function, moderate mitral valve regurgitation, and mild to moderate tricuspid valve regurgitation.     8/27/24:  TTE Aug 2024 with moderate MR     5.  Hypertension  Blood pressure appears well-controlled.  Would continue home antihypertensive medical therapy.  Hold BP meds for SBP<90 or DBP <60     6.  Dyslipidemia  Patient is on Zetia as an outpatient.    Code Status:  Full Code      Demetra Duenas, APRN-CNP

## 2024-08-27 NOTE — NURSING NOTE
Heart Failure Nurse Navigator    The role of the HF nurse navigator is to (1) characterize risk profiles of patients with heart failure transitioning from [id number] after hospitalization, (2) recommend interventions to improve care and reduce risks of worse post-hospitalization outcomes. Potential recommendations may touch base on patient/family education, additional consults that may bring value, and appointment scheduling.    Assessment    I met with Shani Watson at the bedside, and my impressions include: Patient was alert and oriented, sitting up in bed and agreeable to meet with navigator at this time.     Patients Cardiologist(s): Dr. Campa, Interventional Cardiologist; Dr. Santiago, EP    Follow Up Appointments:   Dr. Macias 9/11/2024 10:40   Demetra Duenas, Cardiology NP 10/16/2024 13:00    Patients Primary Care Provider: Dr. Pedroza,  Provider    1. Medical Domain  What is the patient's most recent LVEF? 35-40%  HFrEF (LVEF <= 40%) Quadruple therapy recommended  HFmrEF (LVEF 41-49%) Quadruple therapy recommended  HFpEF (LVEF >= 50%) Minimum recommendations: SGLT2i and MRA  Is the patient on GDMT for their condition?   ARNI/ACEI/ARB: Yes  BB: Yes  MRA: Yes  SGLT2i: Yes  Could this patient have advanced heart failure (Stage D heart failure)?: N/A   If yes, the potential markers of advanced heart failure include:       REFERENCE: Potential markers of advanced HF   Inotrope (dobutamine or milrinone) used during this admission?   LVEF<=25%?   >=2 hospitalizations for ADHF in the last year?   Severe symptoms of HF (fatigue, dyspnea, confusion, edema) despite medical therapy?   Downtitration of GDMT as compared to home medications?   Discontinuation of GDMT because of hypotension or renal intolerance?   Recurrent arrhythmias (AF, VT with ICD shocks)?   Cardiac cachexia (i.e., unintentional weight loss due to HF)?   High-risk biomarker profile (e.g., hyponatremia [Na<135], very elevated BNP, worsening  "kidney function)   Escalating doses of diuretics or persistent edema despite escalation     2. Mind and Emotion  Does this patient have possible cognitive impairment?: No (The Mini-Cog score 0/5)  Ask the patient to memorize these 3 words: banana, sunrise, chair  Ask the patient to draw a clock with hands pointing at \"20 minutes after 8\"  Ask the patient to recall the 3 words  Score: Add number of words recalled + clock drawing (0 points for any errors, 2 points if correct)  Interpretation: A score of 0-2 suggests cognitive impairment is present, a score of 3-5 suggests cognitive impairment is absent  Does this patient have major depression?: Yes (PH-Q2 score 4/6)  Over the last 2 weeks: Little interest or pleasure in doing things? (Not at all +0, Several days +1, More than half the days +2, Nearly every day +3)  Over the last 2 weeks: Feeling down, depressed or hopeless? (Not at all +0, Several days +1, More than half the days +2, Nearly every day +3)  Score: Add points  Interpretation: A score of 3 or more suggests that major depression is likely.     3. Physical Function  Could this patient be frail?: Yes   Defined by presence of all of these: slowness, weakness, shrinking, inactivity, exhaustion  Is this patient at risk for falling?: No   Defined by having experienced a fall in the last 12 months.    4. Social Determinants of Health  Transportation deficits?: No   Lack of insurance?: No   Poor financial resources?: Yes   Living conditions (homelessness, unstable home)?: No   Poor family/social support?: No   Poor personal care?: No   Food insecurity?: Yes   Lack of HCPOA?: No    Summary of Assessment    The following linked problems were found:    Patient has financial concerns including potential inability to provide food for self and her dog (applying for SNAP), ability to pay car insurance and rent. Pt sts her SOB with ambulating, which has been getting worse since her first heart attack, is making it " difficult to go to the store and to walk her dog. Pt sts she does have friends and family that she can talk to but is not able to depend on them often for help.     Discussed CHF, signs and symptoms, and when to call cardiologist/heart failure clinic. Reinforced the importance of following a Low Sodium Diet and monitoring fluid intake, daily weight, blood pressure, lower leg edema, and shortness of breath. Daily BP/Weight/HR log provided. Heart Failure Zones handout given and reviewed. Reviewed importance of taking prescribed medications after discharge, pt provided with medication tracker log.  Living with Heart Failure Education Booklet provided and utilized for education.  Provided HF Navigator's Business card for additional resource post discharge.  Patient verbalized understanding of teaching and had no further questions.         Recommendations    Recommend evaluation by physical therapist, nutritionist, and/or Palliative Medicine consultant (Patient may be frail and/or at risk of falling).  Recommend evaluation by , geriatrician, or psychiatrist (Patient may have cognitive impairment and/or depression).           Sonya George, RN-BSN  Clinical Nurse Navigator- Stroke and CHF  [address] [address]  [phone]  Email: [email or web address]

## 2024-08-27 NOTE — DISCHARGE SUMMARY
Discharge Diagnosis  Ventricular tachycardia (Multi)    Issues Requiring Follow-Up  V. tach, hyponatremia    This discharge took greater than 35 minutes.    Test Results Pending At Discharge  Pending Labs       Order Current Status    Osmolality In process            Hospital Course   68 y.o. female with PMHx s/f CAD prior Cabg, recurrent VTACH s/p ablation ICD pacemaker, Degenerative disk disease HTN, depression presenting with light headedness.  Patient has been feeling somewhat fatigued and weak over past few weeks.  This morning patient was lightheaded short of breath contacted EMS was found to be in ventricular tachycardia patient was given dose of IV amiodarone subsequently converted and route.  In the emergency department on presentation her vital signs showed temperature 97.9 heart rate 75 respiratory rate 16 blood pressure 125/83 SpO2 99% on room air.  Chemistry panel showed sodium 133 potassium 4.0 chloride 102 CO2 25 BUN 12 creatinine 1.12 glucose 174 bilirubin 1.8 other liver enzymes within normal limits troponin 11 repeat troponin 13  TSH 2.92. Chest x-ray showing limited inflation pulmonary edema small right pleural effusion. Echo: EF 35-40%, abnormal wall motion, mod MR/TR     8/23/2024: No acute events overnight. Vitals stable, HR 60, /80. K 3.4- replace. Mg 1.76- supplement to keep >2.0     8/24/2024: no acute events. Patient remained amiodarone drip. HR controlled. She had ICD placement. Need EP follow up     8/25/2024: no acute events overnight. Cardiology ordered mexiletine yesterday. Need EP follow up tomorrow. Possible switch to oral amiodarone today.     8/26:                  Today patient seen in the ICU sitting in the bedside chair.  She is awake alert and interactive appropriately.  She does describe having some GI adverse symptoms when she takes mexiletine but the last dose was tolerated as she apparently did not take it on an empty stomach.  Continues paced rhythm.  Likely  if remains stable will be suitable for discharge tomorrow.  Otherwise denies interval new symptoms or complaints including chest pain palpitations pleuritic type pain unusual shortness of breath cough sputum abdominal pain emesis diarrhea fever or chills.    8/27:               Today patient seen once again in the bedside chair awake alert and interactive appropriately.  Voices no specific complaints and no acute events overnight.  Tolerating mexiletine better at this point.  Discussed her hyponatremia which appears to be longstanding.  She is aware of this issue.  Likely multifactorial but may have a component of SIADH.  Sodium trended up today.  Discussed with her the need to follow-up this issue with her PCP.  Cardiology feels suitable for discharge and she will continue amiodarone loading and then continue 200 daily.  Also Coreg 12.5 twice daily and mexiletine 150 3 times daily.  She will follow-up with Dr. Macias in electrophysiology next week.  Follow-up with Dr. Campa as well. Otherwise denies interval new symptoms or complaints including chest pain palpitations pleuritic type pain unusual shortness of breath cough sputum abdominal pain emesis diarrhea fever or chills.      1. Ventricular tachycardia (Multi)        Add mexiletine yesterday, continue Coreg and amiodarone drip, monitor electrolytes. may switch to oral amiodarone. need EP follow up.  8/26: Continues on amiodarone and mexiletine.  No further V. tach noted.  Having some difficulty tolerating mexiletine.  8/27: Doing better with mexiletine.  She will continue amiodarone loading and Coreg.  She will follow-up with her primary electrophysiologist.       2. 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 S/P CABG x 1        risk modification       4. Ischemic cardiomyopathy  Transthoracic  Echo (TTE) Complete     Transthoracic Echo (TTE) Complete       5. Chronic systolic heart failure (Multi)        compensated, continue lasix  8/26: Cardiology feels CHF compensated.       6. Cardiac resynchronization therapy defibrillator (CRT-D) in place          7. Benign essential hypertension        lisinopril and Coreg       8. V-tach (Multi)  Cardiac Device Check - Inpatient     Cardiac Device Check - Inpatient      9.  Hyponatremia     8/26: Continues moderate hyponatremia.  Uncertain etiology.  Review of EMR shows going back to 2020 she has had this intermittently.  Continue to monitor.     8/27: Sodium trending up slightly today.  She will follow-up with her PCP for further evaluation.         Pertinent Physical Exam At Time of Discharge  Physical Exam  Constitutional:       General: She is not in acute distress.     Appearance: Normal appearance.   HENT:      Head: Normocephalic.      Mouth/Throat:      Mouth: Mucous membranes are moist.      Pharynx: Oropharynx is clear.   Eyes:      Pupils: Pupils are equal, round, and reactive to light.   Cardiovascular:      Rate and Rhythm: Normal rate and regular rhythm.      Heart sounds: Normal heart sounds. No murmur heard.     No gallop.   Pulmonary:      Effort: Pulmonary effort is normal.      Breath sounds: Normal breath sounds.   Abdominal:      General: Bowel sounds are normal. There is no distension.      Palpations: Abdomen is soft.      Tenderness: There is no abdominal tenderness.   Musculoskeletal:         General: No swelling. Normal range of motion.      Cervical back: Neck supple. No rigidity.   Skin:     General: Skin is warm and dry.   Neurological:      General: No focal deficit present.      Mental Status: She is alert.      Cranial Nerves: No cranial nerve deficit.      Sensory: No sensory deficit.   Psychiatric:         Mood and Affect: Mood normal.         Behavior: Behavior normal.   Home Medications     Medication List      START taking  these medications     carvedilol 12.5 mg tablet; Commonly known as: Coreg; Take 1 tablet (12.5   mg) by mouth 2 times a day.   furosemide 20 mg tablet; Commonly known as: Lasix; Take 1 tablet (20 mg)   by mouth 2 times daily (morning and late afternoon).   Jardiance 10 mg; Generic drug: empagliflozin; Take 1 tablet (10 mg) by   mouth once daily.; Start taking on: August 28, 2024   lisinopril 2.5 mg tablet; Take 1 tablet (2.5 mg) by mouth once daily.;   Start taking on: August 28, 2024   magnesium oxide 400 mg (241.3 mg magnesium) tablet; Commonly known as:   Mag-Ox; Take 1 tablet (400 mg) by mouth once daily.; Start taking on:   August 28, 2024   mexiletine 150 mg capsule; Commonly known as: Mexitil; Take 1 capsule   (150 mg) by mouth every 8 hours.     CHANGE how you take these medications     amiodarone 200 mg tablet; Commonly known as: Pacerone; Take 2 tablets   (400 mg) by mouth 2 times a day for 10 days, THEN 1 tablet (200 mg) once   daily.; Start taking on: August 27, 2024; What changed: medication   strength, See the new instructions.     CONTINUE taking these medications     acetaminophen 500 mg tablet; Commonly known as: Tylenol   Adults Multivitamin 18 mg iron-400 mcg-25 mcg tablet; Generic drug:   multivitamin with minerals   ezetimibe 10 mg tablet; Commonly known as: Zetia; Take 1 tablet (10 mg)   by mouth once daily.   fluticasone 50 mcg/actuation nasal spray; Commonly known as: Flonase   gabapentin 300 mg capsule; Commonly known as: Neurontin; Take 1-2   capsules (300-600 mg) by mouth once daily as needed (heel pain).   * sertraline 50 mg tablet; Commonly known as: Zoloft; Take 1 tablet (50   mg) by mouth once daily.   * sertraline 100 mg tablet; Commonly known as: Zoloft; Take 1 tablet   (100 mg) by mouth once daily.   spironolactone 25 mg tablet; Commonly known as: Aldactone; Take 0.5   tablets (12.5 mg) by mouth once daily.  * This list has 2 medication(s) that are the same as other medications    prescribed for you. Read the directions carefully, and ask your doctor or   other care provider to review them with you.     STOP taking these medications     metoprolol succinate XL 25 mg 24 hr tablet; Commonly known as: Toprol-XL   tiZANidine 4 mg tablet; Commonly known as: Zanaflex       Outpatient Follow-Up  PCP, Dr. Macias, Dr. Lokesh Beasley MD

## 2024-08-27 NOTE — CARE PLAN
The patient's goals for the shift include to rest    The clinical goals for the shift include pt will remain free of arrhythmia by end of shift      Problem: Pain - Adult  Goal: Verbalizes/displays adequate comfort level or baseline comfort level  Outcome: Met     Problem: Safety - Adult  Goal: Free from fall injury  Outcome: Met     Problem: Discharge Planning  Goal: Discharge to home or other facility with appropriate resources  Outcome: Met     Problem: Chronic Conditions and Co-morbidities  Goal: Patient's chronic conditions and co-morbidity symptoms are monitored and maintained or improved  Outcome: Met     Problem: Heart Failure  Goal: Report improvement of dyspnea/breathlessness this shift  Outcome: Met     Problem: Arrythmia/Dysrhythmia  Goal: Serial ECG will return to baseline  Outcome: Met

## 2024-08-27 NOTE — PROGRESS NOTES
Music Therapy Note    Shani Watson was referred by RN    Therapy Session  Referral Type: New referral this admission  Visit Type: New visit  Session Start Time: 1357  Session End Time: 1431  Intervention Delivery: In-person  Conflict of Service: None  Number of family members present: 0  Number of staff members present: 0     Pre-assessment  Pain Score: 0 - No pain  Stress Level (0-10): 5  Anxiety Level (0-10): 5  Coping Level (0-10): 6  Mood/Affect: Anxious, Appropriate, Participative  Verbalized Emotional State: Anxiety (Overwhelmed)         Treatment/Interventions  Areas of Focus: Relaxation, Stress reduction, Locus of control  Music Therapy Interventions: Live music listening, Empathic listening/validating emotions  Interruption: Yes  Interrupted by:  (Phone call)  Interruption Outcome: Session ended  Patient Fell Asleep at End of Session: No    Post-assessment  Unable to Assess Reason: Session interrupted  Mood/Affect: Appropriate, Calm, Goal focused, Participative  Verbalized Emotional State: Relief, Gratitude  Continue Visiting: Yes  Total Session Time (min): 34 minutes    Narrative  Assessment Detail: Upon arrival of music therapist, pt was found in room awake, alert, displaying appropriate affect. Pt expressed that she has had limited sleep and has been feeling anxious due to having to be in the ICU. When asked of coping skills that they currently use pt reported that they enjoy  listening to music and singing. Pt reported that they enjoy listeing to folk music. Pt also discussed that her family are musicians and that music as been a huge part of their life.  Plan: Music therapist provided education with a focus on using music as a means of stress and anxiety management. Music therapist assessed pts non musical needs, followed by providing live music listening to enhance relaxation, a sense of control, and stress management.  Intervention: During intervention(s) pt participated by expressing thoughts and  "feelings and music therapist provided supportive listening and offering validation to pt. Pt engaged in taking deeper breaths and closing her eyes, letting the music of her preference surround her has she layed in bed.  Evaluation: Pt responded to music therapy session by displaying calm affect, via a reduction in facial/body tension, longer and deeper breaths, and smiling. Pt self reported that the session helped her to relax stating, \"It's working!\"  Follow-up: If applicable, will follow up with pt.  Patient Comments: \"It's working.\"    Education Documentation  No documentation found.          "

## 2024-08-28 ENCOUNTER — TELEPHONE (OUTPATIENT)
Dept: CARDIOLOGY | Facility: HOSPITAL | Age: 69
End: 2024-08-28
Payer: MEDICARE

## 2024-08-28 ENCOUNTER — PATIENT OUTREACH (OUTPATIENT)
Dept: PRIMARY CARE | Facility: CLINIC | Age: 69
End: 2024-08-28
Payer: MEDICARE

## 2024-08-28 ENCOUNTER — HOSPITAL ENCOUNTER (OUTPATIENT)
Dept: CARDIOLOGY | Facility: HOSPITAL | Age: 69
Discharge: HOME | End: 2024-08-28
Payer: MEDICARE

## 2024-08-28 DIAGNOSIS — I44.2 ATRIOVENTRICULAR BLOCK, COMPLETE (MULTI): ICD-10-CM

## 2024-08-28 DIAGNOSIS — Z95.810 CARDIAC RESYNCHRONIZATION THERAPY DEFIBRILLATOR (CRT-D) IN PLACE: ICD-10-CM

## 2024-08-28 DIAGNOSIS — I47.29 OTHER VENTRICULAR TACHYCARDIA (MULTI): ICD-10-CM

## 2024-08-28 PROCEDURE — 93296 REM INTERROG EVL PM/IDS: CPT

## 2024-08-28 NOTE — PROGRESS NOTES
Discharge Facility:[facility]  Discharge Diagnosis:Ventricular Tachycardia  Admission Date:08/22/24  Discharge Date: 08/27/24    PCP Appointment Date:09/09/24  Specialist Appointment Date:   Hospital Encounter and Summary Linked: Yes  See discharge assessment below for further details  Engagement  Call Start Time: 0147 (8/28/2024  1:47 PM)    Medications  Medications reviewed with patient/caregiver?: Yes (carvedolol 12.5 ,lasix 20mg ,mag ox, jardiance 10mg) (8/28/2024  1:47 PM)  Is the patient having any side effects they believe may be caused by any medication additions or changes?: No (8/28/2024  1:47 PM)  Does the patient have all medications ordered at discharge?: Yes (8/28/2024  1:47 PM)  Is the patient taking all medications as directed (includes completed medication regime)?: Yes (8/28/2024  1:47 PM)    Appointments  Does the patient have a primary care provider?: Yes (8/28/2024  1:47 PM)  Care Management Interventions: Verified appointment date/time/provider (8/28/2024  1:47 PM)    Self Management  Has home health visited the patient within 72 hours of discharge?: Not applicable (8/28/2024  1:47 PM)  Has all Durable Medical Equipment (DME) been delivered?: No (8/28/2024  1:47 PM)    Patient Teaching  Does the patient have access to their discharge instructions?: Yes (8/28/2024  1:47 PM)  Care Management Interventions: Reviewed instructions with patient (8/28/2024  1:47 PM)  What is the patient's perception of their health status since discharge?: Improving (8/28/2024  1:47 PM)    Wrap Up  Call End Time: 0200 (8/28/2024  1:47 PM)

## 2024-08-28 NOTE — TELEPHONE ENCOUNTER
8/28 4:58pm - Returned patient's VM regarding needing to schedule hospital follow up. Offered appt this Friday 8/30 with Yonathan Hawkins. Requested that patient call the office back to schedule.

## 2024-08-29 ASSESSMENT — PAIN SCALES - GENERAL: PAINLEVEL_OUTOF10: 0 - NO PAIN

## 2024-08-29 NOTE — PROGRESS NOTES
Music Therapy Note    Shani Watson was referred by RN     Therapy Session  Referral Type: New referral this admission  Visit Type: Follow-up visit  Session Start Time: 1415  Session End Time: 1427  Intervention Delivery: In-person  Conflict of Service: None  Number of family members present: 0  Number of staff members present: 0     Pre-assessment  Unable to Assess Reason: Outcomes not assessed  Pain Score: 0 - No pain  Stress Level (0-10): 3  Anxiety Level (0-10): 0  Coping Level (0-10): 9  Mood/Affect: Appropriate  Verbalized Emotional State: Acceptance, Hopefulness         Treatment/Interventions  Areas of Focus: Coping  Music Therapy Interventions: Termination, Empathic listening/validating emotions  Interruption: No  Patient Fell Asleep at End of Session: No    Post-assessment  Unable to Assess Reason: Outcomes not evaluated  0-10 (Numeric) Pain Score: 0 - No pain  Stress Level (0-10): 0  Anxiety Level (0-10): 0  Coping Level (0-10): 9  Mood/Affect: Appropriate, Participative  Verbalized Emotional State: Hopefulness  Continue Visiting: No  Total Session Time (min): 12 minutes    Narrative  Assessment Detail: Upon arrival of music therapist, pt was seen in room alert, awake, displaying appropirate affect. Pt expressed that she was feeling much better and was relieved that she was being discharged. Pt expressed that yesterdays session reminded her of how much music is healing and that she needs to engage in making music more and using her music listening for mindfulness as well.  Plan: Music therapist followed up with pt with a focus on providing support to pt whom was about to be discharged. Music therapist provided supportive listening and education via validation and recommendations in regard to using music as a means of coping.  Intervention: During session, pt participated in actively sighting musical goals for herself upon discharge with a focus on wellness.  Evaluation: Pt reponded to music therapy  "session via expressing gratitude for the support and stating, \"I am excited to play my keyboad again, making music makes me feel good and I have been reminded of that today.\"  Follow-up: No follow up at this time, pt is being discharged within the hour.  Patient Comments: \"I am excited to play my keyboad again, making music makes me feel good and I have been reminded of that today.\"    Education Documentation  No documentation found.          "

## 2024-08-30 ENCOUNTER — OFFICE VISIT (OUTPATIENT)
Dept: CARDIOLOGY | Facility: HOSPITAL | Age: 69
End: 2024-08-30
Payer: MEDICARE

## 2024-08-30 VITALS
HEART RATE: 84 BPM | DIASTOLIC BLOOD PRESSURE: 60 MMHG | SYSTOLIC BLOOD PRESSURE: 112 MMHG | OXYGEN SATURATION: 99 % | HEIGHT: 61 IN | BODY MASS INDEX: 25.49 KG/M2 | WEIGHT: 135 LBS

## 2024-08-30 DIAGNOSIS — I25.810 ATHEROSCLEROSIS OF CORONARY ARTERY BYPASS GRAFT OF NATIVE HEART WITHOUT ANGINA PECTORIS: ICD-10-CM

## 2024-08-30 DIAGNOSIS — I10 BENIGN ESSENTIAL HYPERTENSION: ICD-10-CM

## 2024-08-30 DIAGNOSIS — I47.29 PAROXYSMAL VENTRICULAR TACHYCARDIA (MULTI): Primary | ICD-10-CM

## 2024-08-30 DIAGNOSIS — Z95.810 CARDIAC RESYNCHRONIZATION THERAPY DEFIBRILLATOR (CRT-D) IN PLACE: ICD-10-CM

## 2024-08-30 PROCEDURE — 99213 OFFICE O/P EST LOW 20 MIN: CPT | Performed by: PHYSICIAN ASSISTANT

## 2024-08-30 PROCEDURE — 93005 ELECTROCARDIOGRAM TRACING: CPT | Performed by: PHYSICIAN ASSISTANT

## 2024-08-30 PROCEDURE — 93010 ELECTROCARDIOGRAM REPORT: CPT | Performed by: INTERNAL MEDICINE

## 2024-08-30 ASSESSMENT — ENCOUNTER SYMPTOMS
WHEEZING: 0
ORTHOPNEA: 0
BACK PAIN: 1
NAUSEA: 0
FEVER: 0
WEAKNESS: 0
DYSURIA: 0
SHORTNESS OF BREATH: 0
PALPITATIONS: 0
DIARRHEA: 0
VOMITING: 0
ABDOMINAL PAIN: 0

## 2024-08-30 NOTE — PROGRESS NOTES
Cardiology follow up:    HPI:  Shani Watson is a 68 y.o. female who presented to the emergency department with shortness of breath, lightheadedness, and generalized fatigue.  Patient reports that she has noticed increased shortness of breath which has been worsening for the last week.  This morning after taking her dog outside she noted she was lightheaded and mildly nauseated.  Her shortness of breath was further increased.  She denies any associated palpitations but given her symptoms called EMS.  EMS noted that she was in VT but spontaneously converted back to a paced rhythm.  Patient was given IV amiodarone by EMS.  BMP shows a serum sodium of 133, serum potassium of 4.0, serum creatinine of 1.12.  Serum magnesium was 1.85.  BNP was 876.  Troponin was 11-13.  CBC showed a hemoglobin of 12.0.  TSH done 8/6/2024 was 2.92.  Chest x-ray shows cardiomegaly with perihilar vascular congestion and small right pleural effusion.  Cardiac catheterization done in April 2024 showed moderate nonobstructive CAD with a patent LIMA to LAD.  Echocardiogram done in January 2024 showed mildly reduced left ventricular systolic function with an ejection fraction of 45%, grade 3 diastolic dysfunction, low normal right ventricular systolic function, moderate mitral valve regurgitation, and mild to moderate tricuspid valve regurgitation.  ECG showed AV paced rhythm with heart rate of 73 bpm.  During my exam the patient was resting comfortably in bed.     Subjective Data:  Denies any new complains today. Feels well.     Overnight Events:    No arrhythmia noted overnight.  Device interrogation was reviewed.  VT was not captured on her device interrogation.  VT detection zone was turned off on the device and this was slow VT so we could not capture this on device.  Nevertheless no further arrhythmias noted on telemetry monitor.  Case discussed with EP team.  8/26/2024 : No more NSVT. C/O nausea  8/27/24:  Reports issues with back pain  "overnight that improved after taking gabapentin.  No further VT/NSVT over the past 24 hours upon review of telemetry.  RN to walk patient with portable monitor to monitor rhythm as patient reports she was sweeping a rug off when VT occurred prior to admission. Hospitalist is following hyponatremia.  AV paced on telemetry.  8-30-24: This a very pleasant 68-year-old female patient known to Dr. Campa and .  She presents after recent admission where she was noted to have ventricular tachycardia.  She was reloaded on amiodarone and subsequently was also started on mexiletine.  Medication making her feel little off balance and also a little photosensitive if she is out in the sun or her skin starts to itch a little bit.  She has not had any palpitations or tachyarrhythmia.  She denies lightheadedness dizziness syncope or falling.  No chest pain.  On discharge her potassium and magnesium levels were within normal limits.  Her EKG today shows AV paced rhythm.     Objective Data:  Last Recorded Vitals:  Vitals:    08/30/24 1354   BP: 112/60   BP Location: Right arm   Patient Position: Sitting   BP Cuff Size: Adult   Pulse: 84   SpO2: 99%   Weight: 61.2 kg (135 lb)   Height: 1.549 m (5' 0.98\")         Last Labs:  Results from last 7 days   Lab Units 08/27/24  0624 08/26/24  0550 08/25/24  0403   WBC AUTO x10*3/uL 6.2 7.5 9.4   HEMOGLOBIN g/dL 13.8 13.7 13.3   HEMATOCRIT % 39.4 39.3 38.4   PLATELETS AUTO x10*3/uL 206 204 248      Results from last 7 days   Lab Units 08/27/24  0624 08/26/24  0550 08/25/24  0403   SODIUM mmol/L 127* 125* 127*   POTASSIUM mmol/L 3.7 3.6 4.4   CHLORIDE mmol/L 93* 89* 90*   CO2 mmol/L 27 27 29   BUN mg/dL 19 18 20   CREATININE mg/dL 1.36* 1.43* 1.42*   CALCIUM mg/dL 9.1 9.2 9.5   GLUCOSE mg/dL 94 100* 121*      Lab Results   Component Value Date    TSH 2.92 08/06/2024      PTT - No results in last year.  _   _ _     TROPHS   Date/Time Value Ref Range Status   08/22/2024 10:50 AM 13 0 - 13 " ng/L Final   08/22/2024 09:35 AM 11 0 - 13 ng/L Final     BNP   Date/Time Value Ref Range Status   08/22/2024 09:35  0 - 99 pg/mL Final     HGBA1C   Date/Time Value Ref Range Status   08/22/2024 09:35 AM 5.7 see below % Final   10/26/2018 08:58 AM 5.5 % Final     Comment:          Diagnosis of Diabetes-Adults   Non-Diabetic: < or = 5.6%   Increased risk for developing diabetes: 5.7-6.4%   Diagnostic of diabetes: > or = 6.5%  .       Monitoring of Diabetes                Age (y)     Therapeutic Goal (%)   Adults:          >18           <7.0   Pediatrics:    13-18           <7.5                   7-12           <8.0                   0- 6            7.5-8.5   American Diabetes Association. Diabetes Care 33(S1), Jan 2010.       LDLCALC   Date/Time Value Ref Range Status   02/05/2024 09:59 AM 81 <=99 mg/dL Final     Comment:                                 Near   Borderline      AGE      Desirable  Optimal    High     High     Very High     0-19 Y     0 - 109     ---    110-129   >/= 130     ----    20-24 Y     0 - 119     ---    120-159   >/= 160     ----      >24 Y     0 -  99   100-129  130-159   160-189     >/=190       VLDL   Date/Time Value Ref Range Status   02/05/2024 09:59 AM 43 0 - 40 mg/dL Final   11/10/2022 07:28 AM 15 0 - 40 mg/dL Final   02/15/2022 03:45 PM 22 0 - 40 mg/dL Final   08/13/2021 07:11 AM 23 0 - 40 mg/dL Final      Last I/O:  No intake/output data recorded.    Echo:  Transthoracic Echo (TTE) Complete 08/22/2024  1. Multiple segmental abnormalities exist. See findings.   2. Left ventricular ejection fraction is moderately decreased, by visual estimate at 35-40%.   3. Abnormal wall motion.   4. Spectral Doppler shows a pseudonormal pattern of left ventricular diastolic filling.   5. There is mildly reduced right ventricular systolic function.   6. Mildly enlarged right ventricle.   7. The left atrium is moderately dilated.   8. Moderate mitral valve regurgitation.   9. Moderate tricuspid  regurgitation.  10. Moderately elevated right ventricular systolic pressure.      Ejection Fractions:  EF   Date/Time Value Ref Range Status   08/22/2024 02:29 PM 38 %    01/03/2024 01:33 PM 45       Cath:  Cardiac Catheterization Procedure 04/11/2024   1. Moderate non-obstructive coronary artery disease.   2. Patent LIMA to LAD.   3. Left Ventricular end-diastolic pressure = 12.   4. Normal LV filling pressures.      Review of Systems   Constitutional: Negative for fever and malaise/fatigue.   Cardiovascular:  Negative for chest pain, orthopnea and palpitations.   Respiratory:  Negative for shortness of breath and wheezing.    Skin:  Negative for itching and rash.        ? Photosensitivity due to the Amiodarone   Musculoskeletal:  Positive for back pain.   Gastrointestinal:  Negative for abdominal pain, diarrhea, nausea and vomiting.   Genitourinary:  Negative for dysuria.   Neurological:  Negative for weakness.        Woozy and legs weak and wobbly at times--falls       Physical Exam  Constitutional:       General: She is not in acute distress.     Appearance: Normal appearance.      Comments: talkative   HENT:      Mouth/Throat:      Mouth: Mucous membranes are moist.   Cardiovascular:      Rate and Rhythm: Normal rate and regular rhythm.      Heart sounds: Normal heart sounds.   Pulmonary:      Effort: Pulmonary effort is normal.      Breath sounds: Normal breath sounds.   Abdominal:      Palpations: Abdomen is soft.      Tenderness: There is no abdominal tenderness.   Musculoskeletal:      Right lower leg: No edema.      Left lower leg: No edema.   Skin:     General: Skin is warm and dry.   Neurological:      Mental Status: She is alert and oriented to person, place, and time.   Psychiatric:         Behavior: Behavior is cooperative.          Assessment/Plan   1.  VT  Patient had an episode of VT noted by EMS for which she received IV amiodarone and magnesium.  The patient has a history of ventricular  tachycardia and is underwent 2 prior ablations VT ablation in 2019 and again in June 2024.  Patient also has a BiV ICD in place and is chronically on amiodarone therapy.  Would continue home beta-blocker and uptitrate dose of Coreg to 12.5 mg twice daily  Patient had another episode of VT overnight and was transferred to ICU.  Patient was switched to amiodarone IV.  Will continue IV amiodarone at this time.  .  08/24  No in-house EP available.  Case was discussed with patient's EP provider.  We will also start on mexiletine 150 mg 3 times daily.  No urgent indication for transfer for ablation as inpatient given asymptomatic patient.  I will also request device interrogation.    08/25/2024- No further episode of VT on monitor. Patient on mexiletine as per EP recs. Will switch to oral amiodarone. Device interrogation was reviewed.  VT was not captured on her device interrogation.  VT detection zone was turned off on the device and this was slow VT so we could not capture this on device. No arrhythmia in Vfibb zone were noted. Discussed with EP.   8- : No further VT - Continue amio and Mexilitine  8/27/24:  No further VT over the past 24 hours upon review of telemetry.  Continue Amiodarone 400 mg BID x25 doses total and then 200 mg daily.  Continue carvedilol 12.5 mg BID and mexiletine 150 mg every 8 hours.  She will need to arrange f/u with Dr. Macias, her usual EP in 1-2 weeks - she verbalized understanding.  Discussed with Dr. Liban Campa and since she has not had any further VT over the past 24 hours, okay to from cardiac standpoint to d/c home.  Message sent to arrange f/u with Dr. Liban Campa.  8-30-24: No further VT or symptoms of VT.  Her EKG shows AV paced rhythm.  Continue current medical therapies and she has follow-up with EP cardiology on Tuesday.  Again on discharge potassium and magnesium levels were within normal limits and she is on magnesium supplementation at home.     2.  HFrEF  Patient  presenting with increased shortness of breath found to have elevated BNP of 876 as well as chest x-ray showing perihilar vascular congestion and small pleural effusion.  Patient has been started on IV furosemide for diuresis.  Would continue home dose of spironolactone.  Will add SGLT2 inhibitor.  Continue beta-blocker therapy.  Monitor urine output, renal function, and serum electrolytes while here.  Echocardiogram done in January 2024 showed mildly reduced left ventricular systolic function with an ejection fraction of 45%, grade 3 diastolic dysfunction, low normal right ventricular systolic function, moderate mitral valve regurgitation, and mild to moderate tricuspid valve regurgitation.  8/24/2024 : increase coreg dose  08/25/2024- Will switch to oral lasix. Continue other medical therapy, appears compensated one exam.   8/26/2024 : Compensated  8/27/24: HFrEF  Not volume overloaded on exam  Continue current medications which include:  Beta blocker: Carvedilol 12.5 mg BID  ACE inhibitor/ARB/ARNI: Lisinopril 2.5 mg daily  MRA: spironolactone 12.5 mg daily  SGLT2i:  empagliflozin 10 mg daily  Diuretic: Furosemide 20 mg BID  Device: Medtronic CRT-D  TTE Aug 2024 with LVEF 35-40%   8-30-24:        3.  CAD  The patient has a history of coronary artery disease with prior bypass done in October 2018.  Cardiac catheterization done in April 2024 showed moderate nonobstructive CAD with a patent LIMA to LAD.  This appears stable she denies any chest discomfort.  Troponin was 11-13.  Continue with medical therapy.  8-30-24: No chest pain or anginal symptoms.  Again blood pressure is well-controlled.     4.  Mitral regurgitation  Echocardiogram done in January 2024 showed mildly reduced left ventricular systolic function with an ejection fraction of 45%, grade 3 diastolic dysfunction, low normal right ventricular systolic function, moderate mitral valve regurgitation, and mild to moderate tricuspid valve regurgitation.      8/27/24:  TTE Aug 2024 with moderate MR     5.  Hypertension  Blood pressure appears well-controlled.  Would continue home antihypertensive medical therapy.  Hold BP meds for SBP<90 or DBP <60     6.  Dyslipidemia  Patient is on Zetia as an outpatient.    Overall patient is stable from a cardiac standpoint with no symptoms to indicate recurrent ventricular tachycardia.  Maybe a little skin photosensitivity due to the amiodarone.  Her EKG shows AV paced rhythm and although her QT is longer she does have the ventricular paced rhythm.  Will review with Dr. Campa and again she has follow-up with EP service on Tuesday.  Should the patient have any periods of elevated heart rate or racing heartbeats or any symptoms associated with that she should call 911 or go to the emergency department immediately.      Ernesto Hawkins PA-C  8/30/2024  2:21 PM

## 2024-08-30 NOTE — PATIENT INSTRUCTIONS
Avoid direct exposure to the sun.  Continue current meds and keep appointment with Dr. Macias on Tuesday.  If any further sustained fast HR please call 911 or go back to ER.

## 2024-08-31 NOTE — DOCUMENTATION CLARIFICATION NOTE
"    PATIENT:               FAYE CHERY  ACCT #:                  [id number]  MRN:                       [id number]  :                       1955  ADMIT DATE:       2024 9:23 AM  DISCH DATE:        2024 2:15 PM  RESPONDING PROVIDER #:        [id number]          PROVIDER RESPONSE TEXT:    Acute on Chronic Systolic Congestive Heart Failure    CDI QUERY TEXT:    Clarification    Instruction:    Based on your assessment of the patient and the clinical information, please provide the requested documentation by clicking on the appropriate radio button and enter any additional information if prompted.    Question: Please further clarify the type and acuity of congestive heart failure    When answering this query, please exercise your independent professional judgment. The fact that a question is being asked, does not imply that any particular answer is desired or expected.    The patient's clinical indicators include:  Clinical Information: There is conflicting documentation in the medical record which requires clarification.    Clinical Indicators: Per H and P, \"Acute on chronic systolic CHF likely secondary to ventricular tachycardia  IV furosemide and home meds.\"    Per d/c sum, \"Chronic systolic heart failure-compensated, continue lasix  : Cardiology feels CHF compensated.    Per cardiac consult, \"HFrEF  Patient presenting with increased shortness of breath found to have elevated BNP of 876 as well as chest x-ray showing perihilar vascular congestion and small pleural effusion.  Patient has been started on IV furosemide for diuresis...    Echocardiogram done in 2024 showed mildly reduced left ventricular systolic function with an ejection fraction of 45%, grade 3 diastolic dysfunction, low normal right ventricular systolic function, moderate mitral valve regurgitation, and mild to moderate tricuspid valve regurgitation.\"    Treatment: IV lasix, PO lasix, cardiac consult, BNP, CXR, echo    Risk " Factors: 68yof with noted CHF with noted SOB and VT  Options provided:  -- Acute on Chronic Systolic Congestive Heart Failure  -- Chronic Systolic Congestive Heart Failure  -- Other - I will add my own diagnosis  -- Refer to Clinical Documentation Reviewer    Query created by: Cristal Edwards on 8/30/2024 2:22 PM      Electronically signed by:  HAKEEM JAMES MD 8/31/2024 5:00 PM

## 2024-09-04 ENCOUNTER — OFFICE VISIT (OUTPATIENT)
Dept: CARDIOLOGY | Facility: HOSPITAL | Age: 69
End: 2024-09-04
Payer: MEDICARE

## 2024-09-04 VITALS
SYSTOLIC BLOOD PRESSURE: 105 MMHG | HEIGHT: 61 IN | HEART RATE: 75 BPM | DIASTOLIC BLOOD PRESSURE: 77 MMHG | OXYGEN SATURATION: 97 % | WEIGHT: 136 LBS | BODY MASS INDEX: 25.68 KG/M2

## 2024-09-04 DIAGNOSIS — I25.5 ISCHEMIC CARDIOMYOPATHY: ICD-10-CM

## 2024-09-04 DIAGNOSIS — I47.29 VENTRICULAR TACHYCARDIA (PAROXYSMAL) (MULTI): Primary | ICD-10-CM

## 2024-09-04 LAB
ATRIAL RATE: 75 BPM
P AXIS: 97 DEGREES
P OFFSET: 152 MS
P ONSET: 120 MS
PR INTERVAL: 128 MS
Q ONSET: 184 MS
QRS COUNT: 12 BEATS
QRS DURATION: 186 MS
QT INTERVAL: 498 MS
QTC CALCULATION(BAZETT): 556 MS
QTC FREDERICIA: 536 MS
R AXIS: -83 DEGREES
T AXIS: 97 DEGREES
T OFFSET: 433 MS
VENTRICULAR RATE: 75 BPM

## 2024-09-04 PROCEDURE — 93005 ELECTROCARDIOGRAM TRACING: CPT | Performed by: INTERNAL MEDICINE

## 2024-09-04 PROCEDURE — 3008F BODY MASS INDEX DOCD: CPT | Performed by: INTERNAL MEDICINE

## 2024-09-04 PROCEDURE — 93010 ELECTROCARDIOGRAM REPORT: CPT | Performed by: INTERNAL MEDICINE

## 2024-09-04 PROCEDURE — 1159F MED LIST DOCD IN RCRD: CPT | Performed by: INTERNAL MEDICINE

## 2024-09-04 PROCEDURE — 1111F DSCHRG MED/CURRENT MED MERGE: CPT | Performed by: INTERNAL MEDICINE

## 2024-09-04 PROCEDURE — 3074F SYST BP LT 130 MM HG: CPT | Performed by: INTERNAL MEDICINE

## 2024-09-04 PROCEDURE — 1126F AMNT PAIN NOTED NONE PRSNT: CPT | Performed by: INTERNAL MEDICINE

## 2024-09-04 PROCEDURE — 1036F TOBACCO NON-USER: CPT | Performed by: INTERNAL MEDICINE

## 2024-09-04 PROCEDURE — 99214 OFFICE O/P EST MOD 30 MIN: CPT | Performed by: INTERNAL MEDICINE

## 2024-09-04 PROCEDURE — 99214 OFFICE O/P EST MOD 30 MIN: CPT | Mod: 25 | Performed by: INTERNAL MEDICINE

## 2024-09-04 PROCEDURE — 3078F DIAST BP <80 MM HG: CPT | Performed by: INTERNAL MEDICINE

## 2024-09-04 PROCEDURE — 1123F ACP DISCUSS/DSCN MKR DOCD: CPT | Performed by: INTERNAL MEDICINE

## 2024-09-04 RX ORDER — AMIODARONE HYDROCHLORIDE 200 MG/1
200 TABLET ORAL DAILY
COMMUNITY

## 2024-09-04 ASSESSMENT — ENCOUNTER SYMPTOMS
LOSS OF SENSATION IN FEET: 0
DEPRESSION: 0
OCCASIONAL FEELINGS OF UNSTEADINESS: 0

## 2024-09-04 ASSESSMENT — PAIN SCALES - GENERAL: PAINLEVEL: 0-NO PAIN

## 2024-09-04 NOTE — PROGRESS NOTES
Referred by Manuel Gibson MD provider found for   Chief Complaint   Patient presents with    Ventricular tachycardia        Shani Watson is a 68 y.o. year old female patient with h/o ICM, recurrent VT s/p VT RFA x 2, the most recent in May 2024 with extensive scar modification in the ant-lat and inferior LV walls. She was readmitted to [facility] last week with recurrent slow VT below the ICD detection zone and was treated with continue mexiletin and load with amiodarone. Presents for follow up.     PMHx/PSHx: As above    FamHx: unremarkable     Allergies:  Allergies   Allergen Reactions    Bactrim [Sulfamethoxazole-Trimethoprim] Rash    Erythromycin Other    Amoxicillin GI Upset    Statins-Hmg-Coa Reductase Inhibitors Diarrhea    Valacyclovir Other     Ha    Codeine Anxiety and Other     Reaction from Community: Other(See Desc)  Mood alteration    Diazepam Anxiety    Latex Rash    Meperidine Other and Rash     Reaction from Community: Other(See Desc) mood alteration    Penicillins GI Upset    Sulfa (Sulfonamide Antibiotics) Other and Rash     rash        Review of Systems    Constitutional: not feeling tired.   Eyes: no eyesight problems.   ENT: no hearing loss and no nosebleeds.   Cardiovascular: no intermittent leg claudication and as noted in HPI.   Respiratory: no chronic cough and no shortness of breath.   Gastrointestinal: no change in bowel habits and no blood in stools.   Genitourinary: no urinary frequency and no hematuria.   Skin: no skin rashes.   Neurological: no seizures and no frequent falls.   Psychiatric: no depression and not suicidal.   All other systems have been reviewed and are negative for complaint.     Outpatient Medications:  Current Outpatient Medications   Medication Instructions    acetaminophen (TYLENOL) 500 mg, oral, Every 6 hours PRN    amiodarone (Pacerone) 200 mg tablet Take 2 tablets (400 mg) by mouth 2 times a day for 10 days, THEN 1 tablet (200 mg) once daily.    carvedilol  "(COREG) 12.5 mg, oral, 2 times daily    ezetimibe (ZETIA) 10 mg, oral, Daily    fluticasone (Flonase) 50 mcg/actuation nasal spray 1 spray, nasal, 2 times daily PRN    furosemide (LASIX) 20 mg, oral, 2 times daily (morning and late afternoon)    gabapentin (NEURONTIN) 300-600 mg, oral, Daily PRN    Jardiance 10 mg, oral, Daily    lisinopril 2.5 mg, oral, Daily    magnesium oxide (MAG-OX) 400 mg, oral, Daily    mexiletine (MEXITIL) 150 mg, oral, Every 8 hours scheduled    multivitamin-min-iron-FA-vit K (Adults Multivitamin) 18 mg iron-400 mcg-25 mcg tablet 1 tablet, oral, Daily    sertraline (ZOLOFT) 50 mg, oral, Daily    sertraline (ZOLOFT) 100 mg, oral, Daily    spironolactone (ALDACTONE) 12.5 mg, oral, Daily         Last Recorded Vitals:      8/27/2024     7:05 AM 8/27/2024     8:00 AM 8/27/2024     9:00 AM 8/27/2024    10:00 AM 8/27/2024    11:00 AM 8/30/2024     1:54 PM 9/4/2024     2:35 PM   Vitals   Systolic  99 108 85 96 112 105   Diastolic  67 75 52 55 60 77   Heart Rate 60 60 74 61 60 84 75   Temp 36.6 °C (97.9 °F)    36.6 °C (97.9 °F)     Resp 15 13 9 17 9     Height (in)      1.549 m (5' 0.98\") 1.549 m (5' 1\")   Weight (lb)  131.39    135 136   BMI  24.84 kg/m2    25.52 kg/m2 25.7 kg/m2   BSA (m2)  1.6 m2    1.62 m2 1.63 m2   Visit Report      Report Report    Visit Vitals  /77 (BP Location: Left arm, Patient Position: Sitting)   Pulse 75   Ht 1.549 m (5' 1\")   Wt 61.7 kg (136 lb)   SpO2 97%   BMI 25.70 kg/m²   OB Status Postmenopausal   Smoking Status Never   BSA 1.63 m²        Physical Exam:  Constitutional: alert and in no acute distress.   Eyes: no erythema, swelling or discharge from the eye .   Neck: neck is supple, symmetric, trachea midline, no masses  and no thyromegaly .   Pulmonary: no increased work of breathing or signs of respiratory distress  and lungs clear to auscultation.    Cardiovascular: carotid pulses 2+ bilaterally with no bruit , JVP was normal, no thrills , regular rhythm, " normal S1 and S2, no murmurs , pedal pulses 2+ bilaterally  and no edema .   Abdomen: abdomen non-tender, no masses  and no hepatomegaly .   Skin: skin warm and dry, normal skin turgor .   Psychiatric judgment and insight is normal  and oriented to person, place and time .        Assessment/Plan   Problem List Items Addressed This Visit             ICD-10-CM    Ischemic cardiomyopathy I25.5    Relevant Orders    ECG 12 Lead    Ventricular tachycardia (paroxysmal) (Multi) - Primary I47.29    Relevant Orders    ECG 12 Lead       Shani Watson is a 68 y.o. year old female patient with h/o ICM, recurrent VT s/p VT RFA x 2, the most recent in May 2024 with extensive scar modification in the ant-lat and inferior LV walls. She was readmitted to [facility] last week with recurrent slow VT below the ICD detection zone and was treated with continue mexiletin and load with amiodarone. Presents for follow up.   Her current ECG shows AV paced rhythm. Device interrogation showed normal device function and no evidence of further VT episodes. A new monitor zone was added with a minimum HR of 150 bpm. Will also decrease amiodarone to 200 mg a day. Will follow up in 1 month.         Manuel Macias MD  Cardiac Electrophysiology      Thank you very much for allowing me to participate in the care of this pleasant patient. Please do not hesitate to contact me with any further questions or concerns regarding his care.    **Disclaimer: This note was dictated by speech recognition, and every effort has been made to prevent any error in transcription, however minor errors may be present**

## 2024-09-09 ENCOUNTER — APPOINTMENT (OUTPATIENT)
Dept: PRIMARY CARE | Facility: CLINIC | Age: 69
End: 2024-09-09
Payer: MEDICARE

## 2024-09-10 ENCOUNTER — PATIENT OUTREACH (OUTPATIENT)
Dept: PRIMARY CARE | Facility: CLINIC | Age: 69
End: 2024-09-10
Payer: MEDICARE

## 2024-09-10 NOTE — PROGRESS NOTES
Follow up call after hospitalization. Patient did not see PCP within 14 days of discharge.  At time of outreach call the patient feels as if their condition has (improved some but still having issues . She stated that she called the Drs office with her concerns.   Addressed any questions or concerns.

## 2024-09-11 ENCOUNTER — APPOINTMENT (OUTPATIENT)
Dept: CARDIOLOGY | Facility: HOSPITAL | Age: 69
End: 2024-09-11
Payer: MEDICARE

## 2024-09-20 DIAGNOSIS — I47.20 VENTRICULAR TACHYCARDIA (MULTI): ICD-10-CM

## 2024-09-20 RX ORDER — MEXILETINE HYDROCHLORIDE 150 MG/1
150 CAPSULE ORAL EVERY 8 HOURS SCHEDULED
Qty: 90 CAPSULE | Refills: 11 | Status: SHIPPED | OUTPATIENT
Start: 2024-09-20 | End: 2025-09-15

## 2024-09-20 RX ORDER — AMIODARONE HYDROCHLORIDE 200 MG/1
200 TABLET ORAL DAILY
Qty: 30 TABLET | Refills: 11 | Status: SHIPPED | OUTPATIENT
Start: 2024-09-20

## 2024-09-23 ENCOUNTER — APPOINTMENT (OUTPATIENT)
Dept: CARDIOLOGY | Facility: CLINIC | Age: 69
End: 2024-09-23
Payer: MEDICARE

## 2024-09-25 ENCOUNTER — PATIENT OUTREACH (OUTPATIENT)
Dept: CARE COORDINATION | Facility: CLINIC | Age: 69
End: 2024-09-25

## 2024-09-25 ENCOUNTER — APPOINTMENT (OUTPATIENT)
Dept: PRIMARY CARE | Facility: CLINIC | Age: 69
End: 2024-09-25
Payer: MEDICARE

## 2024-09-25 VITALS
SYSTOLIC BLOOD PRESSURE: 130 MMHG | WEIGHT: 139.6 LBS | TEMPERATURE: 96.3 F | HEART RATE: 82 BPM | OXYGEN SATURATION: 98 % | DIASTOLIC BLOOD PRESSURE: 86 MMHG | BODY MASS INDEX: 26.38 KG/M2

## 2024-09-25 DIAGNOSIS — H10.31 ACUTE BACTERIAL CONJUNCTIVITIS OF RIGHT EYE: ICD-10-CM

## 2024-09-25 DIAGNOSIS — Z95.0 PRESENCE OF CARDIAC PACEMAKER: ICD-10-CM

## 2024-09-25 DIAGNOSIS — N18.31 CHRONIC KIDNEY DISEASE, STAGE 3A (MULTI): ICD-10-CM

## 2024-09-25 DIAGNOSIS — I47.29 VENTRICULAR TACHYCARDIA (PAROXYSMAL) (MULTI): Primary | ICD-10-CM

## 2024-09-25 DIAGNOSIS — I25.10 CAD IN NATIVE ARTERY: ICD-10-CM

## 2024-09-25 PROCEDURE — 99214 OFFICE O/P EST MOD 30 MIN: CPT | Performed by: INTERNAL MEDICINE

## 2024-09-25 PROCEDURE — 1159F MED LIST DOCD IN RCRD: CPT | Performed by: INTERNAL MEDICINE

## 2024-09-25 PROCEDURE — 1111F DSCHRG MED/CURRENT MED MERGE: CPT | Performed by: INTERNAL MEDICINE

## 2024-09-25 PROCEDURE — 3079F DIAST BP 80-89 MM HG: CPT | Performed by: INTERNAL MEDICINE

## 2024-09-25 PROCEDURE — 3075F SYST BP GE 130 - 139MM HG: CPT | Performed by: INTERNAL MEDICINE

## 2024-09-25 PROCEDURE — 1123F ACP DISCUSS/DSCN MKR DOCD: CPT | Performed by: INTERNAL MEDICINE

## 2024-09-25 ASSESSMENT — ENCOUNTER SYMPTOMS
NEUROLOGICAL NEGATIVE: 1
ENDOCRINE NEGATIVE: 1
EYES NEGATIVE: 1
MUSCULOSKELETAL NEGATIVE: 1
CARDIOVASCULAR NEGATIVE: 1
GASTROINTESTINAL NEGATIVE: 1
ALLERGIC/IMMUNOLOGIC NEGATIVE: 1
HEMATOLOGIC/LYMPHATIC NEGATIVE: 1
RESPIRATORY NEGATIVE: 1
CONSTITUTIONAL NEGATIVE: 1
PSYCHIATRIC NEGATIVE: 1

## 2024-09-25 NOTE — PROGRESS NOTES
Successful outreach to patient regarding hospitalization as patient continues TCM program.   At time of outreach call the patient feels as if their condition has improved some since initial visit with PCP or specialist.  Questions or concerns addressed at this time with patient. She stated that she is at a visit today with DR Ford to discuss her concerns.  Provided contact information to patient if any further non-emergent needs arise.

## 2024-09-25 NOTE — PROGRESS NOTES
Subjective   Patient ID: Shani Watson is a 68 y.o. female who presents for check up.  Patient presents today in follow up re:   VT about 1 month ago that was below the threshold of her pacer/ICD.  She was hospitalized with medication adjustment        Review of Systems   Constitutional: Negative.    HENT: Negative.     Eyes: Negative.    Respiratory: Negative.     Cardiovascular: Negative.    Gastrointestinal: Negative.    Endocrine: Negative.    Genitourinary: Negative.    Musculoskeletal: Negative.    Skin: Negative.    Allergic/Immunologic: Negative.    Neurological: Negative.    Hematological: Negative.    Psychiatric/Behavioral: Negative.         Objective     Blood pressure 130/86, pulse 82, temperature 35.7 °C (96.3 °F), temperature source Temporal, weight 63.3 kg (139 lb 9.6 oz), SpO2 98%.   Physical Exam  Vitals reviewed.   Constitutional:       Appearance: Normal appearance.   Neck:      Vascular: No carotid bruit.   Cardiovascular:      Rate and Rhythm: Normal rate and regular rhythm.      Pulses: Normal pulses.      Heart sounds: Normal heart sounds. No murmur heard.  Pulmonary:      Effort: Pulmonary effort is normal.      Breath sounds: Normal breath sounds. No wheezing or rales.   Abdominal:      General: Abdomen is flat. There is no distension.      Palpations: Abdomen is soft.      Tenderness: There is no abdominal tenderness.   Musculoskeletal:         General: Normal range of motion.      Cervical back: Normal range of motion and neck supple.   Skin:     General: Skin is warm and dry.   Neurological:      General: No focal deficit present.      Mental Status: She is alert and oriented to person, place, and time.   Psychiatric:         Mood and Affect: Mood normal.         Behavior: Behavior normal.         Assessment/Plan   Problem List Items Addressed This Visit       CAD in native artery    Presence of cardiac pacemaker    Ventricular tachycardia (paroxysmal) (Multi) - Primary    Chronic  kidney disease, stage 3a (Multi)     Other Visit Diagnoses       Acute bacterial conjunctivitis of right eye              All issues with CV and rhythm managed by Cardio and electrophysiologist.  All meds reviewed and multiple meds that were started inappropriately by hospitalist that she is not taking are taken off of med list.  She is otherwise medically doing well.  She will continue follow up with Cardio.  She does have GI s/e of Mexitil and is hopeful to stop this after fully loaded with amiodarone.  She relates that she did have recent episode of conjunctivitis in the right eye.  She treated with gentamicin that she had at home and has had resolution.    Follow up as scheduled

## 2024-09-30 ENCOUNTER — APPOINTMENT (OUTPATIENT)
Dept: RADIOLOGY | Facility: CLINIC | Age: 69
End: 2024-09-30
Payer: MEDICARE

## 2024-09-30 ENCOUNTER — APPOINTMENT (OUTPATIENT)
Dept: RADIOLOGY | Facility: HOSPITAL | Age: 69
End: 2024-09-30
Payer: MEDICARE

## 2024-10-08 ENCOUNTER — HOSPITAL ENCOUNTER (OUTPATIENT)
Dept: CARDIOLOGY | Facility: CLINIC | Age: 69
Discharge: HOME | End: 2024-10-08
Payer: MEDICARE

## 2024-10-08 ENCOUNTER — HOSPITAL ENCOUNTER (OUTPATIENT)
Dept: CARDIOLOGY | Facility: HOSPITAL | Age: 69
Discharge: HOME | End: 2024-10-08
Payer: MEDICARE

## 2024-10-08 ENCOUNTER — OFFICE VISIT (OUTPATIENT)
Dept: CARDIOLOGY | Facility: CLINIC | Age: 69
End: 2024-10-08
Payer: MEDICARE

## 2024-10-08 VITALS
HEART RATE: 75 BPM | HEIGHT: 61 IN | OXYGEN SATURATION: 98 % | WEIGHT: 138.8 LBS | RESPIRATION RATE: 20 BRPM | BODY MASS INDEX: 26.21 KG/M2 | DIASTOLIC BLOOD PRESSURE: 87 MMHG | SYSTOLIC BLOOD PRESSURE: 134 MMHG

## 2024-10-08 DIAGNOSIS — I25.5 ISCHEMIC CARDIOMYOPATHY: ICD-10-CM

## 2024-10-08 DIAGNOSIS — I44.2 ATRIOVENTRICULAR BLOCK, COMPLETE (MULTI): ICD-10-CM

## 2024-10-08 DIAGNOSIS — Z95.810 CARDIAC RESYNCHRONIZATION THERAPY DEFIBRILLATOR (CRT-D) IN PLACE: ICD-10-CM

## 2024-10-08 DIAGNOSIS — I47.29 VENTRICULAR TACHYCARDIA (PAROXYSMAL) (MULTI): Primary | ICD-10-CM

## 2024-10-08 DIAGNOSIS — I47.29 OTHER VENTRICULAR TACHYCARDIA: ICD-10-CM

## 2024-10-08 DIAGNOSIS — I47.29 VENTRICULAR TACHYCARDIA (PAROXYSMAL) (MULTI): ICD-10-CM

## 2024-10-08 DIAGNOSIS — Z95.810 CARDIAC RESYNCHRONIZATION THERAPY DEFIBRILLATOR (CRT-D) IN PLACE: Primary | ICD-10-CM

## 2024-10-08 LAB — BODY SURFACE AREA: 1.65 M2

## 2024-10-08 PROCEDURE — 99214 OFFICE O/P EST MOD 30 MIN: CPT | Performed by: INTERNAL MEDICINE

## 2024-10-08 PROCEDURE — 3008F BODY MASS INDEX DOCD: CPT | Performed by: INTERNAL MEDICINE

## 2024-10-08 PROCEDURE — 1036F TOBACCO NON-USER: CPT | Performed by: INTERNAL MEDICINE

## 2024-10-08 PROCEDURE — 3075F SYST BP GE 130 - 139MM HG: CPT | Performed by: INTERNAL MEDICINE

## 2024-10-08 PROCEDURE — 93010 ELECTROCARDIOGRAM REPORT: CPT | Performed by: INTERNAL MEDICINE

## 2024-10-08 PROCEDURE — 1125F AMNT PAIN NOTED PAIN PRSNT: CPT | Performed by: INTERNAL MEDICINE

## 2024-10-08 PROCEDURE — 93005 ELECTROCARDIOGRAM TRACING: CPT | Performed by: INTERNAL MEDICINE

## 2024-10-08 PROCEDURE — 1123F ACP DISCUSS/DSCN MKR DOCD: CPT | Performed by: INTERNAL MEDICINE

## 2024-10-08 PROCEDURE — 1159F MED LIST DOCD IN RCRD: CPT | Performed by: INTERNAL MEDICINE

## 2024-10-08 PROCEDURE — 93284 PRGRMG EVAL IMPLANTABLE DFB: CPT

## 2024-10-08 PROCEDURE — 3079F DIAST BP 80-89 MM HG: CPT | Performed by: INTERNAL MEDICINE

## 2024-10-08 ASSESSMENT — PAIN SCALES - GENERAL: PAINLEVEL: 8

## 2024-10-08 ASSESSMENT — ENCOUNTER SYMPTOMS
LOSS OF SENSATION IN FEET: 1
OCCASIONAL FEELINGS OF UNSTEADINESS: 0
DEPRESSION: 0

## 2024-10-08 ASSESSMENT — COLUMBIA-SUICIDE SEVERITY RATING SCALE - C-SSRS
1. IN THE PAST MONTH, HAVE YOU WISHED YOU WERE DEAD OR WISHED YOU COULD GO TO SLEEP AND NOT WAKE UP?: NO
6. HAVE YOU EVER DONE ANYTHING, STARTED TO DO ANYTHING, OR PREPARED TO DO ANYTHING TO END YOUR LIFE?: NO
2. HAVE YOU ACTUALLY HAD ANY THOUGHTS OF KILLING YOURSELF?: NO

## 2024-10-08 ASSESSMENT — PATIENT HEALTH QUESTIONNAIRE - PHQ9
SUM OF ALL RESPONSES TO PHQ9 QUESTIONS 1 AND 2: 0
1. LITTLE INTEREST OR PLEASURE IN DOING THINGS: NOT AT ALL
2. FEELING DOWN, DEPRESSED OR HOPELESS: NOT AT ALL

## 2024-10-08 NOTE — PROGRESS NOTES
Referred by Manuel Gibson MD provider found for   Chief Complaint   Patient presents with    Ventricular Tachycardia        Shani Watson is a 68 y.o. year old female patient with h/o ICM, recurrent VT s/p VT RFA x 2, the most recent in May 2024 with extensive scar modification in the ant-lat and inferior LV walls. Recently readmitted to [facility] due to slow VT and Amiodarone was added. I sw her a month ago and reprogrammed her device as well as adjusted AAD. Presents today for follow up.     PMHx/PSHx: As above    FamHx: unremarkable     Allergies:  Allergies   Allergen Reactions    Bactrim [Sulfamethoxazole-Trimethoprim] Rash    Erythromycin Other    Amoxicillin GI Upset    Statins-Hmg-Coa Reductase Inhibitors Diarrhea    Valacyclovir Other     Ha    Codeine Anxiety and Other     Reaction from Community: Other(See Desc)  Mood alteration    Diazepam Anxiety    Latex Rash    Meperidine Other and Rash     Reaction from Community: Other(See Desc) mood alteration    Penicillins GI Upset    Sulfa (Sulfonamide Antibiotics) Other and Rash     rash        Review of Systems    Constitutional: not feeling tired.   Eyes: no eyesight problems.   ENT: no hearing loss and no nosebleeds.   Cardiovascular: no intermittent leg claudication and as noted in HPI.   Respiratory: no chronic cough and no shortness of breath.   Gastrointestinal: no change in bowel habits and no blood in stools.   Genitourinary: no urinary frequency and no hematuria.   Skin: no skin rashes.   Neurological: no seizures and no frequent falls.   Psychiatric: no depression and not suicidal.   All other systems have been reviewed and are negative for complaint.     Outpatient Medications:  Current Outpatient Medications   Medication Instructions    acetaminophen (TYLENOL) 500 mg, oral, Every 6 hours PRN    amiodarone (PACERONE) 200 mg, oral, Daily    ezetimibe (ZETIA) 10 mg, oral, Daily    fluticasone (Flonase) 50 mcg/actuation nasal spray 1 spray,  "nasal, 2 times daily PRN    gabapentin (NEURONTIN) 300-600 mg, oral, Daily PRN    mexiletine (MEXITIL) 150 mg, oral, Every 8 hours scheduled    multivitamin-min-iron-FA-vit K (Adults Multivitamin) 18 mg iron-400 mcg-25 mcg tablet 1 tablet, oral, Daily    sertraline (ZOLOFT) 50 mg, oral, Daily    sertraline (ZOLOFT) 100 mg, oral, Daily    spironolactone (ALDACTONE) 12.5 mg, oral, Daily         Last Recorded Vitals:      8/27/2024     8:00 AM 8/27/2024     9:00 AM 8/27/2024    10:00 AM 8/27/2024    11:00 AM 8/30/2024     1:54 PM 9/4/2024     2:35 PM 9/25/2024    12:26 PM   Vitals   Systolic 99 108 85 96 112 105 130   Diastolic 67 75 52 55 60 77 86   Heart Rate 60 74 61 60 84 75 82   Temp    36.6 °C (97.9 °F)   35.7 °C (96.3 °F)   Resp 13 9 17 9      Height (in)     1.549 m (5' 0.98\") 1.549 m (5' 1\")    Weight (lb) 131.39    135 136 139.6   BMI 24.84 kg/m2    25.52 kg/m2 25.7 kg/m2 26.38 kg/m2   BSA (m2) 1.6 m2    1.62 m2 1.63 m2 1.65 m2   Visit Report     Report Report Report    Visit Vitals  OB Status Postmenopausal   Smoking Status Never        Physical Exam:  Constitutional: alert and in no acute distress.   Eyes: no erythema, swelling or discharge from the eye .   Neck: neck is supple, symmetric, trachea midline, no masses  and no thyromegaly .   Pulmonary: no increased work of breathing or signs of respiratory distress  and lungs clear to auscultation.    Cardiovascular: carotid pulses 2+ bilaterally with no bruit , JVP was normal, no thrills , regular rhythm, normal S1 and S2, no murmurs , pedal pulses 2+ bilaterally  and no edema .   Abdomen: abdomen non-tender, no masses  and no hepatomegaly .   Skin: skin warm and dry, normal skin turgor .   Psychiatric judgment and insight is normal  and oriented to person, place and time .        Assessment/Plan   Problem List Items Addressed This Visit             ICD-10-CM    Ventricular tachycardia (paroxysmal) (Multi) I47.29    Relevant Orders    ECG 12 Lead "       Shani Watson is a 68 y.o. year old female patient with h/o ICM, recurrent VT s/p VT RFA x 2, the most recent in May 2024 with extensive scar modification in the ant-lat and inferior LV walls. Recently readmitted to [facility] due to slow VT and Amiodarone was added. I sw her a month ago and reprogrammed her device as well as adjusted AAD. Presents today for follow up.   Her current ECG shows A sense V paced rhythm, HR 70 bpm. She reports SOB on exercise. Her device interrogation today showed no evidence of further Ventricular arrhythmias, otherwise normal device function. Will adjust her rate response to try to prevent the symptoms. She reports poor tolerance to the mexiletine. Will stop it and continue with the amiodarone. Will follow up in 2 months.         Manuel Macias MD  Cardiac Electrophysiology      Thank you very much for allowing me to participate in the care of this pleasant patient. Please do not hesitate to contact me with any further questions or concerns regarding his care.    **Disclaimer: This note was dictated by speech recognition, and every effort has been made to prevent any error in transcription, however minor errors may be present**

## 2024-10-09 LAB
ATRIAL RATE: 70 BPM
P AXIS: 72 DEGREES
P OFFSET: 174 MS
P ONSET: 125 MS
PR INTERVAL: 130 MS
Q ONSET: 190 MS
QRS COUNT: 11 BEATS
QRS DURATION: 172 MS
QT INTERVAL: 502 MS
QTC CALCULATION(BAZETT): 542 MS
QTC FREDERICIA: 528 MS
R AXIS: -78 DEGREES
T AXIS: 99 DEGREES
T OFFSET: 441 MS
VENTRICULAR RATE: 70 BPM

## 2024-10-10 DIAGNOSIS — I50.20 HFREF (HEART FAILURE WITH REDUCED EJECTION FRACTION): Primary | ICD-10-CM

## 2024-10-10 RX ORDER — FUROSEMIDE 20 MG/1
20 TABLET ORAL AS NEEDED
Qty: 30 TABLET | Refills: 0 | Status: SHIPPED | OUTPATIENT
Start: 2024-10-10 | End: 2024-10-10 | Stop reason: ENTERED-IN-ERROR

## 2024-10-10 NOTE — PROGRESS NOTES
"Optivol reading from the ICD shows that she has had significant rise in fluid index.   Will add Furosemide 20 mg 1 tablet daily as needed for weight gain 3# or increased swelling.   BMP in 1 week.   Discussed monitoring diet and avoiding potato chips.  She does not feel that she has congestive sx.  She is not interested in coming to the CHF clinic as she does not feel she has any sx and \"does not have time\" currently.  Advised she call [facility] if she develops any sx of congestion.   She is agreeable with that plan.   "

## 2024-10-11 ENCOUNTER — TELEPHONE (OUTPATIENT)
Dept: CARDIOLOGY | Facility: HOSPITAL | Age: 69
End: 2024-10-11
Payer: MEDICARE

## 2024-10-11 NOTE — TELEPHONE ENCOUNTER
RN called pt at this time, pt states she is [place] out of [place] at this time and wanted to know how her records would transfer. RN states she has [facility] or the new office can fax over a request for records at this time. Pt verbalized understanding.

## 2024-10-11 NOTE — TELEPHONE ENCOUNTER
Pt called in to cancel her 10/15 appt w/Demetra Deunas. She would like a call to talk about her well being before she leaves because she is moving out of state and that is why she cannot make her appt.      Trent NEWTON

## 2024-10-16 ENCOUNTER — APPOINTMENT (OUTPATIENT)
Dept: CARDIOLOGY | Facility: CLINIC | Age: 69
End: 2024-10-16
Payer: MEDICARE

## 2024-10-22 ENCOUNTER — APPOINTMENT (OUTPATIENT)
Dept: PRIMARY CARE | Facility: CLINIC | Age: 69
End: 2024-10-22
Payer: MEDICARE

## 2024-10-22 VITALS
SYSTOLIC BLOOD PRESSURE: 110 MMHG | BODY MASS INDEX: 26.79 KG/M2 | DIASTOLIC BLOOD PRESSURE: 76 MMHG | TEMPERATURE: 97.2 F | WEIGHT: 141.8 LBS | OXYGEN SATURATION: 96 % | HEART RATE: 88 BPM

## 2024-10-22 DIAGNOSIS — Z23 NEED FOR IMMUNIZATION AGAINST INFLUENZA: ICD-10-CM

## 2024-10-22 DIAGNOSIS — I50.20 SYSTOLIC HEART FAILURE, ACC/AHA STAGE C: ICD-10-CM

## 2024-10-22 DIAGNOSIS — R60.9 DEPENDENT EDEMA: ICD-10-CM

## 2024-10-22 DIAGNOSIS — G89.29 CHRONIC BILATERAL LOW BACK PAIN WITH BILATERAL SCIATICA: Primary | ICD-10-CM

## 2024-10-22 DIAGNOSIS — Z23 NEED FOR VACCINATION AGAINST STREPTOCOCCUS PNEUMONIAE: ICD-10-CM

## 2024-10-22 DIAGNOSIS — M54.41 CHRONIC BILATERAL LOW BACK PAIN WITH BILATERAL SCIATICA: Primary | ICD-10-CM

## 2024-10-22 DIAGNOSIS — I10 BENIGN ESSENTIAL HYPERTENSION: ICD-10-CM

## 2024-10-22 DIAGNOSIS — E78.2 MIXED HYPERLIPIDEMIA: ICD-10-CM

## 2024-10-22 DIAGNOSIS — M54.42 CHRONIC BILATERAL LOW BACK PAIN WITH BILATERAL SCIATICA: Primary | ICD-10-CM

## 2024-10-22 PROCEDURE — G0009 ADMIN PNEUMOCOCCAL VACCINE: HCPCS | Performed by: INTERNAL MEDICINE

## 2024-10-22 PROCEDURE — 90662 IIV NO PRSV INCREASED AG IM: CPT | Performed by: INTERNAL MEDICINE

## 2024-10-22 PROCEDURE — 99213 OFFICE O/P EST LOW 20 MIN: CPT | Performed by: INTERNAL MEDICINE

## 2024-10-22 PROCEDURE — 1159F MED LIST DOCD IN RCRD: CPT | Performed by: INTERNAL MEDICINE

## 2024-10-22 PROCEDURE — 90677 PCV20 VACCINE IM: CPT | Performed by: INTERNAL MEDICINE

## 2024-10-22 PROCEDURE — 1123F ACP DISCUSS/DSCN MKR DOCD: CPT | Performed by: INTERNAL MEDICINE

## 2024-10-22 PROCEDURE — G0008 ADMIN INFLUENZA VIRUS VAC: HCPCS | Performed by: INTERNAL MEDICINE

## 2024-10-22 PROCEDURE — 3078F DIAST BP <80 MM HG: CPT | Performed by: INTERNAL MEDICINE

## 2024-10-22 PROCEDURE — 3074F SYST BP LT 130 MM HG: CPT | Performed by: INTERNAL MEDICINE

## 2024-10-22 RX ORDER — EZETIMIBE 10 MG/1
10 TABLET ORAL DAILY
Qty: 90 TABLET | Refills: 1 | Status: SHIPPED | OUTPATIENT
Start: 2024-10-22 | End: 2025-04-20

## 2024-10-22 RX ORDER — SPIRONOLACTONE 25 MG/1
12.5 TABLET ORAL DAILY
Qty: 45 TABLET | Refills: 1 | Status: SHIPPED | OUTPATIENT
Start: 2024-10-22 | End: 2025-04-20

## 2024-10-22 ASSESSMENT — ENCOUNTER SYMPTOMS
CONSTITUTIONAL NEGATIVE: 1
NEUROLOGICAL NEGATIVE: 1
HEMATOLOGIC/LYMPHATIC NEGATIVE: 1
ALLERGIC/IMMUNOLOGIC NEGATIVE: 1
CARDIOVASCULAR NEGATIVE: 1
RESPIRATORY NEGATIVE: 1
MUSCULOSKELETAL NEGATIVE: 1
GASTROINTESTINAL NEGATIVE: 1
ENDOCRINE NEGATIVE: 1
PSYCHIATRIC NEGATIVE: 1
EYES NEGATIVE: 1

## 2024-10-24 ENCOUNTER — APPOINTMENT (OUTPATIENT)
Facility: HOSPITAL | Age: 69
End: 2024-10-24
Payer: MEDICARE

## 2024-11-12 ENCOUNTER — APPOINTMENT (OUTPATIENT)
Dept: CARDIOLOGY | Facility: HOSPITAL | Age: 69
End: 2024-11-12
Payer: MEDICARE

## 2024-11-18 ENCOUNTER — TELEPHONE (OUTPATIENT)
Dept: CARDIOLOGY | Facility: HOSPITAL | Age: 69
End: 2024-11-18
Payer: MEDICARE

## 2024-11-18 NOTE — TELEPHONE ENCOUNTER
"Patient called recently moved to [place] and is having a hard time \"near impossible\" to find a cardiologist. Wanted to know if provider could contact the area and see if could get her in.     " Immunization chart prep completed.  Immunization records verified.  Suzy Ocampo due for Influenza

## 2024-11-25 ENCOUNTER — PATIENT OUTREACH (OUTPATIENT)
Dept: PRIMARY CARE | Facility: CLINIC | Age: 69
End: 2024-11-25
Payer: MEDICARE

## 2024-11-25 NOTE — TELEPHONE ENCOUNTER
RN called pt at this time regarding needing a cardiologist in [place]. No answer at this time. RN will try again.

## 2024-11-27 ENCOUNTER — HOSPITAL ENCOUNTER (OUTPATIENT)
Dept: CARDIOLOGY | Facility: HOSPITAL | Age: 69
Discharge: HOME | End: 2024-11-27
Payer: MEDICARE

## 2024-11-27 DIAGNOSIS — I44.2 ATRIOVENTRICULAR BLOCK, COMPLETE (MULTI): ICD-10-CM

## 2024-11-27 DIAGNOSIS — Z95.810 CARDIAC RESYNCHRONIZATION THERAPY DEFIBRILLATOR (CRT-D) IN PLACE: ICD-10-CM

## 2024-11-27 DIAGNOSIS — I47.29 VENTRICULAR TACHYCARDIA (PAROXYSMAL) (MULTI): ICD-10-CM

## 2024-12-02 ENCOUNTER — DOCUMENTATION (OUTPATIENT)
Dept: CARDIOLOGY | Facility: HOSPITAL | Age: 69
End: 2024-12-02
Payer: MEDICARE

## 2024-12-02 NOTE — PROGRESS NOTES
Spoke with patient, brittani Gaxiola pt looks like she is getting congested based on [employer] device readings. Pt confirms she is getting congested with some shortness of breath, however she moved to [place] and does not wish to follow up with our clinic. Pt does have an appointment with a new PCP tomorrow who she will be addressing her concerns/symptoms with as well as getting referrals to cardiology specialists. Khalida made aware.

## 2024-12-03 ENCOUNTER — APPOINTMENT (OUTPATIENT)
Dept: CARDIOLOGY | Facility: HOSPITAL | Age: 69
End: 2024-12-03
Payer: MEDICARE

## 2024-12-10 ENCOUNTER — APPOINTMENT (OUTPATIENT)
Dept: CARDIOLOGY | Facility: CLINIC | Age: 69
End: 2024-12-10
Payer: MEDICARE

## 2024-12-16 ENCOUNTER — APPOINTMENT (OUTPATIENT)
Dept: RADIOLOGY | Facility: HOSPITAL | Age: 69
End: 2024-12-16
Payer: MEDICARE

## 2025-02-05 ENCOUNTER — APPOINTMENT (OUTPATIENT)
Dept: OPHTHALMOLOGY | Facility: CLINIC | Age: 70
End: 2025-02-05
Payer: MEDICARE

## 2025-02-06 ENCOUNTER — APPOINTMENT (OUTPATIENT)
Dept: PRIMARY CARE | Facility: CLINIC | Age: 70
End: 2025-02-06
Payer: MEDICARE

## 2025-02-20 LAB
ACT BLD: 266 SEC (ref 82–174)
ACT BLD: 356 SEC (ref 82–174)
ACT BLD: 391 SEC (ref 82–174)
ACT BLD: 451 SEC (ref 82–174)
ACT BLD: 472 SEC (ref 82–174)
ACT BLD: 491 SEC (ref 82–174)

## 2025-04-07 DIAGNOSIS — I47.20 VENTRICULAR TACHYCARDIA (PAROXYSMAL): Primary | ICD-10-CM

## 2025-04-07 RX ORDER — AMIODARONE HYDROCHLORIDE 100 MG/1
100 TABLET ORAL DAILY
Qty: 30 TABLET | Refills: 0 | Status: SHIPPED | OUTPATIENT
Start: 2025-04-07

## 2025-05-23 DIAGNOSIS — I10 BENIGN ESSENTIAL HYPERTENSION: ICD-10-CM

## 2025-05-23 DIAGNOSIS — I50.20 SYSTOLIC HEART FAILURE, ACC/AHA STAGE C: ICD-10-CM

## 2025-06-06 RX ORDER — SPIRONOLACTONE 25 MG/1
25 TABLET ORAL DAILY
Qty: 45 TABLET | Refills: 0 | OUTPATIENT
Start: 2025-06-06

## (undated) DEVICE — ACCESS KIT, S-MAK MINI, 4FR 10CM 0.018IN 40CM, NT/PT, ECHO ENHANCE NEEDLE

## (undated) DEVICE — CABLE, OCTARAY, SPLIT HANDLE EXT CABLE, 10FT LG

## (undated) DEVICE — CATHETER, DIAGNOSTIC, DXTERITY, 6FR JR 4.0, 100CM

## (undated) DEVICE — ACCESS KIT, MINI MAK, 4 FR X 10CM, 0.018 X 40CM, NITINOL/PLATINUM, STD NEEDLE

## (undated) DEVICE — TUBING SET, IRRIGATION, SMARTABLATE

## (undated) DEVICE — INTRODUCER, SHEATH, FAST-CATH, 7 FR X 23 CM

## (undated) DEVICE — INTRODUCER, CATHETER, HEMOSTASIS, FAST-CATH, 11 FR X 12 CM

## (undated) DEVICE — GUIDEWIRE, INQUIRE, J TIP, .035 X 210CM, FIXED CORE, DIAGNOSTIC

## (undated) DEVICE — CLOSURE DEVICE, VASCULAR, ANGIO-SEAL, VIP, 6FR, LF

## (undated) DEVICE — CABLE, CATH TO CARTO SYSTEM, 12 HYP/10 REDEL (REPROCESSED)

## (undated) DEVICE — ELECTRODE, QUICK-COMBO, REDI PACK

## (undated) DEVICE — PATCHES, EXTERNAL REFERENCE, CARTO3

## (undated) DEVICE — CATHETER, ULTRASOUND, DIAGNOSTIC, ACUNAV, 10 FR X 90 CM

## (undated) DEVICE — CATHETER, DIAGNOSTIC, DXTERITY, 6 FR, JL 4.0, 100C

## (undated) DEVICE — CABLE, 34 HYP, 34 LEMO, 10FT, SMART TOUCH (REPROCESS)

## (undated) DEVICE — PAD, ELECTRODE DEFIB PADPRO ADULT STRL W/ADAPTER

## (undated) DEVICE — CATHETER, OPTRELL, 36 ELECTRODES, D-F CURVE, SM LOOP

## (undated) DEVICE — GUIDEWIRE, HI-TORQUE, VERSACORE, 145CM, MODIFIED J

## (undated) DEVICE — Device

## (undated) DEVICE — CATHETER, DIAGNOSTIC, DXTERITY, 5FR 100CM, AL10

## (undated) DEVICE — INTRODUCER, AGILIS, MEDIUM CURL, 8.5FR X 71CM, DUAL

## (undated) DEVICE — SHEATH, GLIDESHEATH, SLENDER, 6FR 10CM

## (undated) DEVICE — CATHETER, ANGIO, IMPULSE, IM, 5 FR X 100 CM

## (undated) DEVICE — NEEDLE, TRANSSEPTAL, CURVE, W/STYLET, ADULT, 18 G X 71 CM

## (undated) DEVICE — INTRODUCER, HEMOSTASIS, STR/J .038 IN, 8.5FR 12CM

## (undated) DEVICE — CATHETER, QUADRAPOLAR, 2-5-2MM, 115CM, YELLOW, W/ REDEL

## (undated) DEVICE — SHEATH, PINNACLE, 10 CM,  6FR INTRODUCER, 6FR DIA, 2.5 CM DIALATOR

## (undated) DEVICE — CATHETER, THERMOCOOL SMART TOUCH, SF, D-F CURVE

## (undated) DEVICE — SHIELD, RADPAD, EP LEFT SUBCLAVIAN, 12.5 X 16.5, YELLOW LEVEL ATTENUATION

## (undated) DEVICE — SHEATH, PINNACLE, 10 CM,  4FR INTRODUCER, 4FR DIA, 2.5 CM DIALATOR